# Patient Record
Sex: FEMALE | Race: WHITE | NOT HISPANIC OR LATINO | Employment: FULL TIME | URBAN - METROPOLITAN AREA
[De-identification: names, ages, dates, MRNs, and addresses within clinical notes are randomized per-mention and may not be internally consistent; named-entity substitution may affect disease eponyms.]

---

## 2018-07-20 ENCOUNTER — HOSPITAL ENCOUNTER (OUTPATIENT)
Dept: GENERAL RADIOLOGY | Facility: HOSPITAL | Age: 43
Discharge: HOME | End: 2018-07-20
Attending: INTERNAL MEDICINE | Admitting: INTERNAL MEDICINE

## 2018-07-20 ENCOUNTER — TRANSCRIBE ORDERS (OUTPATIENT)
Dept: ADMINISTRATIVE | Facility: HOSPITAL | Age: 43
End: 2018-07-20

## 2018-07-20 DIAGNOSIS — M25.531 WRIST PAIN, ACUTE, RIGHT: ICD-10-CM

## 2018-07-20 DIAGNOSIS — M25.531 WRIST PAIN, ACUTE, RIGHT: Primary | ICD-10-CM

## 2018-07-20 PROCEDURE — 73110 X-RAY EXAM OF WRIST: CPT

## 2019-02-08 ENCOUNTER — TRANSCRIBE ORDERS (OUTPATIENT)
Dept: ADMINISTRATIVE | Facility: HOSPITAL | Age: 44
End: 2019-02-08

## 2019-02-08 DIAGNOSIS — Z12.31 VISIT FOR SCREENING MAMMOGRAM: Primary | ICD-10-CM

## 2019-02-20 ENCOUNTER — HOSPITAL ENCOUNTER (OUTPATIENT)
Dept: MAMMOGRAPHY | Facility: HOSPITAL | Age: 44
Discharge: HOME | End: 2019-02-20
Admitting: INTERNAL MEDICINE

## 2019-02-20 ENCOUNTER — APPOINTMENT (OUTPATIENT)
Dept: OTHER | Facility: HOSPITAL | Age: 44
End: 2019-02-20

## 2019-02-20 DIAGNOSIS — Z12.31 VISIT FOR SCREENING MAMMOGRAM: ICD-10-CM

## 2019-02-20 PROCEDURE — 77067 SCR MAMMO BI INCL CAD: CPT | Performed by: RADIOLOGY

## 2019-02-20 PROCEDURE — 77063 BREAST TOMOSYNTHESIS BI: CPT | Performed by: RADIOLOGY

## 2019-02-20 PROCEDURE — 77067 SCR MAMMO BI INCL CAD: CPT

## 2019-02-20 PROCEDURE — 77063 BREAST TOMOSYNTHESIS BI: CPT

## 2019-05-20 ENCOUNTER — TRANSCRIBE ORDERS (OUTPATIENT)
Dept: ADMINISTRATIVE | Facility: HOSPITAL | Age: 44
End: 2019-05-20

## 2019-05-20 DIAGNOSIS — R10.12 LEFT UPPER QUADRANT PAIN: Primary | ICD-10-CM

## 2019-05-21 ENCOUNTER — HOSPITAL ENCOUNTER (OUTPATIENT)
Dept: CT IMAGING | Facility: HOSPITAL | Age: 44
Discharge: HOME | End: 2019-05-21
Admitting: NURSE PRACTITIONER

## 2019-05-21 DIAGNOSIS — R10.12 LEFT UPPER QUADRANT PAIN: ICD-10-CM

## 2019-05-21 PROCEDURE — 74176 CT ABD & PELVIS W/O CONTRAST: CPT

## 2019-05-28 ENCOUNTER — TRANSCRIBE ORDERS (OUTPATIENT)
Dept: ADMINISTRATIVE | Facility: HOSPITAL | Age: 44
End: 2019-05-28

## 2019-05-28 DIAGNOSIS — R91.1 NODULE OF LEFT LUNG: ICD-10-CM

## 2019-05-28 DIAGNOSIS — R10.12 LEFT UPPER QUADRANT PAIN: Primary | ICD-10-CM

## 2019-06-05 ENCOUNTER — HOSPITAL ENCOUNTER (OUTPATIENT)
Dept: CT IMAGING | Facility: HOSPITAL | Age: 44
Discharge: HOME | End: 2019-06-05
Admitting: NURSE PRACTITIONER

## 2019-06-05 DIAGNOSIS — R91.1 NODULE OF LEFT LUNG: ICD-10-CM

## 2019-06-05 PROCEDURE — 25010000002 IOPAMIDOL 61 % SOLUTION: Performed by: NURSE PRACTITIONER

## 2019-06-05 PROCEDURE — 71260 CT THORAX DX C+: CPT

## 2019-06-05 RX ADMIN — IOPAMIDOL 75 ML: 612 INJECTION, SOLUTION INTRAVENOUS at 13:30

## 2019-06-11 ENCOUNTER — OFFICE VISIT (OUTPATIENT)
Dept: PULMONOLOGY | Facility: CLINIC | Age: 44
End: 2019-06-11

## 2019-06-11 VITALS
WEIGHT: 171 LBS | OXYGEN SATURATION: 98 % | DIASTOLIC BLOOD PRESSURE: 82 MMHG | HEIGHT: 63 IN | SYSTOLIC BLOOD PRESSURE: 130 MMHG | TEMPERATURE: 98.6 F | HEART RATE: 88 BPM | BODY MASS INDEX: 30.3 KG/M2

## 2019-06-11 DIAGNOSIS — R91.8 MASS OF LOWER LOBE OF LEFT LUNG: ICD-10-CM

## 2019-06-11 DIAGNOSIS — R91.1 LUNG NODULE: Primary | ICD-10-CM

## 2019-06-11 DIAGNOSIS — F17.200 SMOKER: ICD-10-CM

## 2019-06-11 PROCEDURE — 94729 DIFFUSING CAPACITY: CPT | Performed by: INTERNAL MEDICINE

## 2019-06-11 PROCEDURE — 94375 RESPIRATORY FLOW VOLUME LOOP: CPT | Performed by: INTERNAL MEDICINE

## 2019-06-11 PROCEDURE — 94726 PLETHYSMOGRAPHY LUNG VOLUMES: CPT | Performed by: INTERNAL MEDICINE

## 2019-06-11 PROCEDURE — 99244 OFF/OP CNSLTJ NEW/EST MOD 40: CPT | Performed by: INTERNAL MEDICINE

## 2019-06-11 RX ORDER — TIZANIDINE HYDROCHLORIDE 4 MG/1
CAPSULE, GELATIN COATED ORAL
Refills: 0 | COMMUNITY
Start: 2019-06-04 | End: 2019-07-16

## 2019-06-11 RX ORDER — AMITRIPTYLINE HYDROCHLORIDE 50 MG/1
50 TABLET, FILM COATED ORAL NIGHTLY PRN
COMMUNITY
Start: 2019-05-20 | End: 2019-12-10 | Stop reason: HOSPADM

## 2019-06-11 NOTE — PROGRESS NOTES
Sorts Subjective:     Chief Complaint:   Chief Complaint   Patient presents with   • Lung Nodule       HPI:    Mishel Freeman is a 43 y.o. female seen in consultation at the request of PATY Lara for evaluation of a lung mass    This long-standing but intermittent left sided abdominal/flank pain.  She has had this off and on for several years.  This reoccurred last month.  She describes it as sharp and stabbing.  It has defied diagnosis though she does carry a history of diverticulosis.    An abdominal CT was ordered.  This revealed an incidental 2.2 cm mass in the left lower lobe.  A follow-up CT scan of the chest was performed.  This confirmed the finding and revealed no other pulmonary abnormalities and no adenopathy.  She was referred to me for further evaluation.    He has very little the way of pulmonary symptoms.  No weight loss.  She is a long-standing smoker but smokes less than 1 pack of cigarettes per week.  She has no history of tuberculosis, histoplasmosis, or other severe lung infections or any history of lung disease.    Further historical details:    General symptoms:  - no fever  - weight stable  - no edema    Exercise tolerance:  - no unusual exercise limitation    Chest symptoms:  - no chest pain  - normal/appropriate cough    Sputum:  - expectorates clear/white sputum  - denies hemoptysis    Upper airway:  - no sinus congestion or drainage    Reflux symptoms:  - no reflux symptoms    Recent imaging:  - CT of chest: As described    Current medications are:   Current Outpatient Medications:   •  amitriptyline (ELAVIL) 50 MG tablet, , Disp: , Rfl:   •  desvenlafaxine (PRISTIQ) 50 MG 24 hr tablet, Take 50 mg by mouth Daily., Disp: , Rfl:   •  lisinopril-hydrochlorothiazide (PRINZIDE,ZESTORETIC) 20-12.5 MG per tablet, Take 1 tablet by mouth Daily., Disp: , Rfl:   •  TiZANidine (ZANAFLEX) 4 MG capsule, TAKE 1 (ONE) CAPSULE EVERY 8 HOURS AS NEEDED FOR MUSCLE TIGHTNESS**DO NOT DRIVE WHILE  TAKING**, Disp: , Rfl: 0.      The patient's relevant past medical, surgical, family and social history were reviewed and updated in Epic as appropriate.     ROS:    Review of Systems  ROS documented in patient questionnaire ×14 systems.  Reviewed with patient.  Otherwise negative except as noted in HPI.    Objective:    Physical Exam   Constitutional: She is oriented to person, place, and time. She appears well-developed and well-nourished.   HENT:   Head: Normocephalic and atraumatic.   Nose: Nose normal.   Mouth/Throat: Oropharynx is clear and moist. No oropharyngeal exudate.   Eyes: Conjunctivae are normal. No scleral icterus.   Neck: No tracheal deviation present. No thyromegaly present.   Cardiovascular: Normal rate and regular rhythm. Exam reveals no gallop and no friction rub.   No murmur heard.  Pulmonary/Chest: Effort normal. No respiratory distress. She has no wheezes. She has no rales.   Musculoskeletal: She exhibits no edema or deformity.   Neurological: She is alert and oriented to person, place, and time.   Skin: Skin is warm and dry. No rash noted.   Psychiatric: She has a normal mood and affect. Her behavior is normal.   Nursing note and vitals reviewed.      Diagnostics:     PFT:  - normal spirometry  - normal lung volumes  -Borderline decrease in diffusing capacity    CXR:  - no additional    Assessment:    Problem List Items Addressed This Visit        Pulmonary Problems    Mass of lower lobe of left lung    Overview     2.2 cm incidental finding by CT 6/2019            Other    Smoker    Overview     Less than 1 pk/week           Other Visit Diagnoses     Lung nodule    -  Primary    Relevant Orders    Pulmonary Function Test (Completed)          43-year-old female with incidentally noted left lower lobe mass measuring 2.2 cm.  She has no pulmonary symptoms but given the size of this lesion it is high risk.    Plan:     1. PET scan  2. If positive will proceed with resection and I will refer her  to a thoracic surgeon  3. If negative could consider bronchoscopy with a EBUS  4. Discussed with patient and  in detail.  She understands the high risk nature for cancer in her case and the necessity for follow-up and treatment.    Level of Risk Moderate due to: undiagnosed new problem    Signed by  Willi Mo MD

## 2019-06-14 ENCOUNTER — HOSPITAL ENCOUNTER (OUTPATIENT)
Dept: PET IMAGING | Facility: HOSPITAL | Age: 44
Discharge: HOME | End: 2019-06-14

## 2019-06-14 ENCOUNTER — HOSPITAL ENCOUNTER (OUTPATIENT)
Dept: PET IMAGING | Facility: HOSPITAL | Age: 44
Discharge: HOME | End: 2019-06-14
Admitting: INTERNAL MEDICINE

## 2019-06-14 DIAGNOSIS — R91.8 MASS OF LOWER LOBE OF LEFT LUNG: ICD-10-CM

## 2019-06-14 LAB — GLUCOSE BLDC GLUCOMTR-MCNC: 95 MG/DL (ref 70–130)

## 2019-06-14 PROCEDURE — A9552 F18 FDG: HCPCS | Performed by: INTERNAL MEDICINE

## 2019-06-14 PROCEDURE — 82962 GLUCOSE BLOOD TEST: CPT

## 2019-06-14 PROCEDURE — 78815 PET IMAGE W/CT SKULL-THIGH: CPT

## 2019-06-14 PROCEDURE — 0 FLUDEOXYGLUCOSE F18 SOLUTION: Performed by: INTERNAL MEDICINE

## 2019-06-14 RX ADMIN — FLUDEOXYGLUCOSE F18 1 DOSE: 300 INJECTION INTRAVENOUS at 08:13

## 2019-07-02 ENCOUNTER — OFFICE VISIT (OUTPATIENT)
Dept: CARDIAC SURGERY | Facility: CLINIC | Age: 44
End: 2019-07-02

## 2019-07-02 VITALS
DIASTOLIC BLOOD PRESSURE: 90 MMHG | TEMPERATURE: 98.6 F | HEIGHT: 62 IN | SYSTOLIC BLOOD PRESSURE: 110 MMHG | BODY MASS INDEX: 31.47 KG/M2 | WEIGHT: 171 LBS | HEART RATE: 87 BPM | OXYGEN SATURATION: 100 %

## 2019-07-02 DIAGNOSIS — R91.8 MASS OF LOWER LOBE OF LEFT LUNG: Primary | ICD-10-CM

## 2019-07-02 PROCEDURE — 99204 OFFICE O/P NEW MOD 45 MIN: CPT | Performed by: THORACIC SURGERY (CARDIOTHORACIC VASCULAR SURGERY)

## 2019-07-02 RX ORDER — LORAZEPAM 1 MG/1
1 TABLET ORAL 2 TIMES DAILY PRN
COMMUNITY
Start: 2019-06-17 | End: 2019-11-09

## 2019-07-02 NOTE — PROGRESS NOTES
07/02/2019  Patient Information  Mishel Freeman                                                                                          [address]  [facility] [address]   1975  'PCP/Referring Physician'  Anastacia Jose MD  [phone]  Willi Mo*  [phone]  Chief Complaint   Patient presents with   • Consult     NP per Dr. Mo for Left Lower Lobe Lung Nodule-Found on CT for Left Sided Abdominal Pain       History of Present Illness: 43-year-old  female with a history of hypertension and active tobacco abuse who presents with a left lower lobe lung nodule.  Initially, the patient had left flank pain that prompted a CT scan of the abdomen.  This revealed an incidental left lower lobe lung mass.  Her left flank pain has been intermittently present for years.  She denies any symptoms including cough, hemoptysis, weight loss, lymphadenopathy, fevers or chills.    Patient Active Problem List   Diagnosis   • Microscopic hematuria   • Mass of lower lobe of left lung   • Smoker     Past Medical History:   Diagnosis Date   • Anxiety    • Depression    • GERD (gastroesophageal reflux disease)    • Hypertension    • Lung nodule      Past Surgical History:   Procedure Laterality Date   • CHOLECYSTECTOMY  1991   • ENDOMETRIAL ABLATION W/ NOVASURE  2010    WITH LAP BTL   • VAGINAL HYSTERECTOMY  2010    Partial        Current Outpatient Medications:   •  amitriptyline (ELAVIL) 50 MG tablet, As Needed., Disp: , Rfl:   •  desvenlafaxine (PRISTIQ) 50 MG 24 hr tablet, Take 50 mg by mouth Daily., Disp: , Rfl:   •  lisinopril-hydrochlorothiazide (PRINZIDE,ZESTORETIC) 20-12.5 MG per tablet, Take 1 tablet by mouth Daily., Disp: , Rfl:   •  LORazepam (ATIVAN) 1 MG tablet, 2 (Two) Times a Day., Disp: , Rfl:   •  TiZANidine (ZANAFLEX) 4 MG capsule, TAKE 1 (ONE) CAPSULE EVERY 8 HOURS AS NEEDED FOR MUSCLE TIGHTNESS**DO NOT DRIVE WHILE TAKING**, Disp: , Rfl: 0  Allergies   Allergen Reactions   •  Macrobid [Nitrofurantoin Macrocrystal] Hives     Social History     Socioeconomic History   • Marital status:      Spouse name: Not on file   • Number of children: 1   • Years of education: Not on file   • Highest education level: Not on file   Occupational History   • Occupation: [employer]   Tobacco Use   • Smoking status: Current Every Day Smoker     Packs/day: 0.25     Years: 30.00     Pack years: 7.50     Types: Cigarettes   • Smokeless tobacco: Never Used   • Tobacco comment: 5 a day    Substance and Sexual Activity   • Alcohol use: Yes     Alcohol/week: 0.6 oz     Types: 1 Cans of beer per week   • Drug use: No   • Sexual activity: Defer   Social History Narrative    Lives in [place].      Family History   Problem Relation Age of Onset   • Breast cancer Paternal Aunt         40's   • Cancer Mother    • Diabetes Father    • Diabetes Brother    • Cancer Maternal Aunt    • Ovarian cancer Neg Hx      Review of Systems   Constitution: Negative for chills, fever, malaise/fatigue, night sweats and weight loss.   HENT: Negative for hearing loss, odynophagia and sore throat.    Cardiovascular: Negative for chest pain, dyspnea on exertion, leg swelling, orthopnea and palpitations.   Respiratory: Negative for cough and hemoptysis.    Endocrine: Negative for cold intolerance, heat intolerance, polydipsia, polyphagia and polyuria.   Hematologic/Lymphatic: Bruises/bleeds easily.   Skin: Negative for itching and rash.   Musculoskeletal: Negative for joint pain, joint swelling and myalgias.   Gastrointestinal: Positive for abdominal pain (left side) and constipation. Negative for diarrhea, hematemesis, hematochezia, melena, nausea and vomiting.   Genitourinary: Positive for hematuria. Negative for dysuria and frequency.   Neurological: Negative for focal weakness, headaches, numbness and seizures.   Psychiatric/Behavioral: Negative for suicidal ideas. The patient is nervous/anxious.    All other systems  "reviewed and are negative.    Vitals:    07/02/19 1008   BP: 110/90   BP Location: Left arm   Patient Position: Sitting   Pulse: 87   Temp: 98.6 °F (37 °C)   SpO2: 100%   Weight: 77.6 kg (171 lb)   Height: 157.5 cm (62\")      Physical Exam   Constitutional: She is oriented to person, place, and time. She appears well-developed and well-nourished. No distress.   HENT:   Head: Normocephalic and atraumatic.   Eyes: Conjunctivae and EOM are normal. No scleral icterus.   Neck: Normal range of motion. Neck supple. No JVD present. No tracheal deviation present.   Cardiovascular: Normal rate, regular rhythm and normal heart sounds. Exam reveals no gallop and no friction rub.   No murmur heard.  Pulmonary/Chest: Effort normal and breath sounds normal. No stridor. No respiratory distress. She has no wheezes. She has no rales.   Abdominal: Soft. She exhibits no distension and no mass. There is no tenderness. There is no rebound and no guarding.   Musculoskeletal: Normal range of motion. She exhibits no deformity.   Lymphadenopathy:     She has no cervical adenopathy.     She has no axillary adenopathy.        Right: No supraclavicular adenopathy present.        Left: No supraclavicular adenopathy present.   Neurological: She is alert and oriented to person, place, and time.   Skin: No rash noted. No erythema.   Psychiatric: She has a normal mood and affect. Her behavior is normal. Judgment and thought content normal.       Labs/Imaging:  -PET/CT performed 6/14/2019, personally reviewed, demonstrates a left lower lobe nodule with an SUV of only 1.5.  There is symmetric activity within the palatine tonsils with an SUV of 5.1, thought to be reactive lymphoid tissue from rhinitis or allergies.  -CT the chest performed 6/6/2019, personally reviewed, demonstrates a noncalcified 2.2 x 1.7 x 1.5 cm left lower lobe nodule.  There is no mediastinal lymphadenopathy.  -Pulmonary function test performed 6/11/2019, FEV1 2.54 (89% " predicted), DLCO 73% predicted    Assessment/Plan: 43-year-old  female with a history of hypertension and active tobacco abuse who presents with an incidental 2.2 cm noncalcified left lower lobe lung nodule.  We discussed options including continued observation with a repeat CT scan, biopsy and surgical resection for diagnosis.  His options are listed in order of aggressiveness.  Unfortunately, this nodule is fairly central and surgical biopsy would require left lower lobectomy, which is fairly aggressive.  The patient elected to proceed with navigational bronchoscopy for biopsy.  I discussed this with Dr. Mo will arrange navigational bronchoscopy.  We will have the patient return to clinic after this biopsy has been performed.    Patient Active Problem List   Diagnosis   • Microscopic hematuria   • Mass of lower lobe of left lung   • Smoker

## 2019-07-16 ENCOUNTER — TRANSCRIBE ORDERS (OUTPATIENT)
Dept: PULMONOLOGY | Facility: CLINIC | Age: 44
End: 2019-07-16

## 2019-07-16 ENCOUNTER — APPOINTMENT (OUTPATIENT)
Dept: PREADMISSION TESTING | Facility: HOSPITAL | Age: 44
End: 2019-07-16

## 2019-07-16 LAB
ALBUMIN SERPL-MCNC: 4.2 G/DL (ref 3.5–5.2)
ALBUMIN/GLOB SERPL: 1.8 G/DL
ALP SERPL-CCNC: 43 U/L (ref 39–117)
ALT SERPL W P-5'-P-CCNC: 18 U/L (ref 1–33)
ANION GAP SERPL CALCULATED.3IONS-SCNC: 10 MMOL/L (ref 5–15)
AST SERPL-CCNC: 14 U/L (ref 1–32)
BILIRUB SERPL-MCNC: 0.3 MG/DL (ref 0.2–1.2)
BUN BLD-MCNC: 11 MG/DL (ref 6–20)
BUN/CREAT SERPL: 15.7 (ref 7–25)
CALCIUM SPEC-SCNC: 9.8 MG/DL (ref 8.6–10.5)
CHLORIDE SERPL-SCNC: 102 MMOL/L (ref 98–107)
CO2 SERPL-SCNC: 28 MMOL/L (ref 22–29)
CREAT BLD-MCNC: 0.7 MG/DL (ref 0.57–1)
DEPRECATED RDW RBC AUTO: 43.5 FL (ref 37–54)
ERYTHROCYTE [DISTWIDTH] IN BLOOD BY AUTOMATED COUNT: 12.5 % (ref 12.3–15.4)
GFR SERPL CREATININE-BSD FRML MDRD: 91 ML/MIN/1.73
GLOBULIN UR ELPH-MCNC: 2.4 GM/DL
GLUCOSE BLD-MCNC: 70 MG/DL (ref 65–99)
HCT VFR BLD AUTO: 37.5 % (ref 34–46.6)
HGB BLD-MCNC: 11.9 G/DL (ref 12–15.9)
INR PPP: 1.04 (ref 0.85–1.16)
MCH RBC QN AUTO: 30.4 PG (ref 26.6–33)
MCHC RBC AUTO-ENTMCNC: 31.7 G/DL (ref 31.5–35.7)
MCV RBC AUTO: 95.7 FL (ref 79–97)
PLATELET # BLD AUTO: 300 10*3/MM3 (ref 140–450)
PMV BLD AUTO: 8.8 FL (ref 6–12)
POTASSIUM BLD-SCNC: 4.5 MMOL/L (ref 3.5–5.2)
PROT SERPL-MCNC: 6.6 G/DL (ref 6–8.5)
PROTHROMBIN TIME: 13.1 SECONDS (ref 11.2–14.3)
RBC # BLD AUTO: 3.92 10*6/MM3 (ref 3.77–5.28)
SODIUM BLD-SCNC: 140 MMOL/L (ref 136–145)
WBC NRBC COR # BLD: 6.09 10*3/MM3 (ref 3.4–10.8)

## 2019-07-16 PROCEDURE — 85610 PROTHROMBIN TIME: CPT | Performed by: INTERNAL MEDICINE

## 2019-07-16 PROCEDURE — 85027 COMPLETE CBC AUTOMATED: CPT | Performed by: INTERNAL MEDICINE

## 2019-07-16 PROCEDURE — 80053 COMPREHEN METABOLIC PANEL: CPT | Performed by: INTERNAL MEDICINE

## 2019-07-16 PROCEDURE — 93010 ELECTROCARDIOGRAM REPORT: CPT | Performed by: INTERNAL MEDICINE

## 2019-07-16 PROCEDURE — 93005 ELECTROCARDIOGRAM TRACING: CPT

## 2019-07-16 PROCEDURE — 36415 COLL VENOUS BLD VENIPUNCTURE: CPT

## 2019-07-16 RX ORDER — ACETAMINOPHEN, ASPIRIN AND CAFFEINE 250; 250; 65 MG/1; MG/1; MG/1
1 TABLET, FILM COATED ORAL EVERY 6 HOURS PRN
COMMUNITY
End: 2022-10-20 | Stop reason: HOSPADM

## 2019-07-17 ENCOUNTER — HOSPITAL ENCOUNTER (OUTPATIENT)
Facility: HOSPITAL | Age: 44
Setting detail: HOSPITAL OUTPATIENT SURGERY
Discharge: HOME | End: 2019-07-17
Attending: INTERNAL MEDICINE | Admitting: ANESTHESIOLOGY

## 2019-07-17 ENCOUNTER — ANESTHESIA EVENT (OUTPATIENT)
Dept: GASTROENTEROLOGY | Facility: HOSPITAL | Age: 44
End: 2019-07-17

## 2019-07-17 ENCOUNTER — APPOINTMENT (OUTPATIENT)
Dept: GENERAL RADIOLOGY | Facility: HOSPITAL | Age: 44
End: 2019-07-17

## 2019-07-17 ENCOUNTER — ANESTHESIA (OUTPATIENT)
Dept: GASTROENTEROLOGY | Facility: HOSPITAL | Age: 44
End: 2019-07-17

## 2019-07-17 VITALS
HEART RATE: 80 BPM | RESPIRATION RATE: 20 BRPM | OXYGEN SATURATION: 94 % | TEMPERATURE: 97.8 F | SYSTOLIC BLOOD PRESSURE: 124 MMHG | DIASTOLIC BLOOD PRESSURE: 73 MMHG

## 2019-07-17 DIAGNOSIS — R91.1 LUNG NODULE: ICD-10-CM

## 2019-07-17 PROBLEM — F17.200 CURRENT SMOKER: Status: ACTIVE | Noted: 2019-07-17

## 2019-07-17 LAB
B-HCG UR QL: NEGATIVE
INTERNAL NEGATIVE CONTROL: NEGATIVE
INTERNAL POSITIVE CONTROL: POSITIVE
Lab: NORMAL

## 2019-07-17 PROCEDURE — 25010000002 ONDANSETRON PER 1 MG: Performed by: NURSE ANESTHETIST, CERTIFIED REGISTERED

## 2019-07-17 PROCEDURE — 87206 SMEAR FLUORESCENT/ACID STAI: CPT | Performed by: INTERNAL MEDICINE

## 2019-07-17 PROCEDURE — 76000 FLUOROSCOPY <1 HR PHYS/QHP: CPT

## 2019-07-17 PROCEDURE — 81025 URINE PREGNANCY TEST: CPT | Performed by: ANESTHESIOLOGY

## 2019-07-17 PROCEDURE — 25010000002 PROPOFOL 10 MG/ML EMULSION: Performed by: NURSE ANESTHETIST, CERTIFIED REGISTERED

## 2019-07-17 PROCEDURE — 31623 DX BRONCHOSCOPE/BRUSH: CPT | Performed by: INTERNAL MEDICINE

## 2019-07-17 PROCEDURE — 87070 CULTURE OTHR SPECIMN AEROBIC: CPT | Performed by: INTERNAL MEDICINE

## 2019-07-17 PROCEDURE — 88112 CYTOPATH CELL ENHANCE TECH: CPT | Performed by: INTERNAL MEDICINE

## 2019-07-17 PROCEDURE — 87116 MYCOBACTERIA CULTURE: CPT | Performed by: INTERNAL MEDICINE

## 2019-07-17 PROCEDURE — S0260 H&P FOR SURGERY: HCPCS | Performed by: INTERNAL MEDICINE

## 2019-07-17 PROCEDURE — 31627 NAVIGATIONAL BRONCHOSCOPY: CPT | Performed by: INTERNAL MEDICINE

## 2019-07-17 PROCEDURE — 25010000002 FENTANYL CITRATE (PF) 100 MCG/2ML SOLUTION: Performed by: NURSE ANESTHETIST, CERTIFIED REGISTERED

## 2019-07-17 PROCEDURE — 88305 TISSUE EXAM BY PATHOLOGIST: CPT | Performed by: INTERNAL MEDICINE

## 2019-07-17 PROCEDURE — 87102 FUNGUS ISOLATION CULTURE: CPT | Performed by: INTERNAL MEDICINE

## 2019-07-17 PROCEDURE — 31628 BRONCHOSCOPY/LUNG BX EACH: CPT | Performed by: INTERNAL MEDICINE

## 2019-07-17 PROCEDURE — 25010000002 NEOSTIGMINE 10 MG/10ML SOLUTION: Performed by: NURSE ANESTHETIST, CERTIFIED REGISTERED

## 2019-07-17 PROCEDURE — 31624 DX BRONCHOSCOPE/LAVAGE: CPT | Performed by: INTERNAL MEDICINE

## 2019-07-17 PROCEDURE — 71045 X-RAY EXAM CHEST 1 VIEW: CPT

## 2019-07-17 PROCEDURE — 87205 SMEAR GRAM STAIN: CPT | Performed by: INTERNAL MEDICINE

## 2019-07-17 RX ORDER — SODIUM CHLORIDE 0.9 % (FLUSH) 0.9 %
3-10 SYRINGE (ML) INJECTION AS NEEDED
Status: DISCONTINUED | OUTPATIENT
Start: 2019-07-17 | End: 2019-07-17 | Stop reason: HOSPADM

## 2019-07-17 RX ORDER — PROMETHAZINE HYDROCHLORIDE 25 MG/ML
6.25 INJECTION, SOLUTION INTRAMUSCULAR; INTRAVENOUS ONCE AS NEEDED
Status: DISCONTINUED | OUTPATIENT
Start: 2019-07-17 | End: 2019-07-17 | Stop reason: HOSPADM

## 2019-07-17 RX ORDER — ROCURONIUM BROMIDE 10 MG/ML
INJECTION, SOLUTION INTRAVENOUS AS NEEDED
Status: DISCONTINUED | OUTPATIENT
Start: 2019-07-17 | End: 2019-07-17 | Stop reason: SURG

## 2019-07-17 RX ORDER — IPRATROPIUM BROMIDE AND ALBUTEROL SULFATE 2.5; .5 MG/3ML; MG/3ML
3 SOLUTION RESPIRATORY (INHALATION) ONCE AS NEEDED
Status: DISCONTINUED | OUTPATIENT
Start: 2019-07-17 | End: 2019-07-17 | Stop reason: HOSPADM

## 2019-07-17 RX ORDER — PROPOFOL 10 MG/ML
VIAL (ML) INTRAVENOUS AS NEEDED
Status: DISCONTINUED | OUTPATIENT
Start: 2019-07-17 | End: 2019-07-17 | Stop reason: SURG

## 2019-07-17 RX ORDER — HYDRALAZINE HYDROCHLORIDE 20 MG/ML
5 INJECTION INTRAMUSCULAR; INTRAVENOUS
Status: DISCONTINUED | OUTPATIENT
Start: 2019-07-17 | End: 2019-07-17 | Stop reason: HOSPADM

## 2019-07-17 RX ORDER — ONDANSETRON 2 MG/ML
4 INJECTION INTRAMUSCULAR; INTRAVENOUS ONCE AS NEEDED
Status: COMPLETED | OUTPATIENT
Start: 2019-07-17 | End: 2019-07-17

## 2019-07-17 RX ORDER — FENTANYL CITRATE 50 UG/ML
50 INJECTION, SOLUTION INTRAMUSCULAR; INTRAVENOUS
Status: DISCONTINUED | OUTPATIENT
Start: 2019-07-17 | End: 2019-07-17 | Stop reason: HOSPADM

## 2019-07-17 RX ORDER — SODIUM CHLORIDE, SODIUM LACTATE, POTASSIUM CHLORIDE, CALCIUM CHLORIDE 600; 310; 30; 20 MG/100ML; MG/100ML; MG/100ML; MG/100ML
9 INJECTION, SOLUTION INTRAVENOUS CONTINUOUS
Status: DISCONTINUED | OUTPATIENT
Start: 2019-07-17 | End: 2019-07-17 | Stop reason: HOSPADM

## 2019-07-17 RX ORDER — PROMETHAZINE HYDROCHLORIDE 25 MG/1
25 TABLET ORAL ONCE AS NEEDED
Status: DISCONTINUED | OUTPATIENT
Start: 2019-07-17 | End: 2019-07-17 | Stop reason: HOSPADM

## 2019-07-17 RX ORDER — SODIUM CHLORIDE 0.9 % (FLUSH) 0.9 %
3 SYRINGE (ML) INJECTION EVERY 12 HOURS SCHEDULED
Status: DISCONTINUED | OUTPATIENT
Start: 2019-07-17 | End: 2019-07-17 | Stop reason: HOSPADM

## 2019-07-17 RX ORDER — PROMETHAZINE HYDROCHLORIDE 25 MG/1
25 SUPPOSITORY RECTAL ONCE AS NEEDED
Status: DISCONTINUED | OUTPATIENT
Start: 2019-07-17 | End: 2019-07-17 | Stop reason: HOSPADM

## 2019-07-17 RX ORDER — GLYCOPYRROLATE 0.2 MG/ML
INJECTION INTRAMUSCULAR; INTRAVENOUS AS NEEDED
Status: DISCONTINUED | OUTPATIENT
Start: 2019-07-17 | End: 2019-07-17 | Stop reason: SURG

## 2019-07-17 RX ORDER — LABETALOL HYDROCHLORIDE 5 MG/ML
5 INJECTION, SOLUTION INTRAVENOUS
Status: DISCONTINUED | OUTPATIENT
Start: 2019-07-17 | End: 2019-07-17 | Stop reason: HOSPADM

## 2019-07-17 RX ORDER — LIDOCAINE HYDROCHLORIDE 10 MG/ML
INJECTION, SOLUTION EPIDURAL; INFILTRATION; INTRACAUDAL; PERINEURAL AS NEEDED
Status: DISCONTINUED | OUTPATIENT
Start: 2019-07-17 | End: 2019-07-17 | Stop reason: SURG

## 2019-07-17 RX ORDER — FAMOTIDINE 20 MG/1
20 TABLET, FILM COATED ORAL ONCE
Status: DISCONTINUED | OUTPATIENT
Start: 2019-07-17 | End: 2019-07-17 | Stop reason: HOSPADM

## 2019-07-17 RX ORDER — MEPERIDINE HYDROCHLORIDE 25 MG/ML
12.5 INJECTION INTRAMUSCULAR; INTRAVENOUS; SUBCUTANEOUS
Status: DISCONTINUED | OUTPATIENT
Start: 2019-07-17 | End: 2019-07-17 | Stop reason: HOSPADM

## 2019-07-17 RX ORDER — LIDOCAINE HYDROCHLORIDE 10 MG/ML
0.5 INJECTION, SOLUTION EPIDURAL; INFILTRATION; INTRACAUDAL; PERINEURAL ONCE AS NEEDED
Status: DISCONTINUED | OUTPATIENT
Start: 2019-07-17 | End: 2019-07-17 | Stop reason: HOSPADM

## 2019-07-17 RX ORDER — NEOSTIGMINE METHYLSULFATE 1 MG/ML
INJECTION, SOLUTION INTRAVENOUS AS NEEDED
Status: DISCONTINUED | OUTPATIENT
Start: 2019-07-17 | End: 2019-07-17 | Stop reason: SURG

## 2019-07-17 RX ORDER — FAMOTIDINE 10 MG/ML
20 INJECTION, SOLUTION INTRAVENOUS ONCE
Status: COMPLETED | OUTPATIENT
Start: 2019-07-17 | End: 2019-07-17

## 2019-07-17 RX ADMIN — ROCURONIUM BROMIDE 30 MG: 10 INJECTION INTRAVENOUS at 14:03

## 2019-07-17 RX ADMIN — FENTANYL CITRATE 50 MCG: 50 INJECTION, SOLUTION INTRAMUSCULAR; INTRAVENOUS at 14:03

## 2019-07-17 RX ADMIN — FAMOTIDINE 20 MG: 10 INJECTION, SOLUTION INTRAVENOUS at 13:06

## 2019-07-17 RX ADMIN — LIDOCAINE HYDROCHLORIDE 50 MG: 10 INJECTION, SOLUTION EPIDURAL; INFILTRATION; INTRACAUDAL; PERINEURAL at 14:03

## 2019-07-17 RX ADMIN — SODIUM CHLORIDE, POTASSIUM CHLORIDE, SODIUM LACTATE AND CALCIUM CHLORIDE 9 ML/HR: 600; 310; 30; 20 INJECTION, SOLUTION INTRAVENOUS at 13:06

## 2019-07-17 RX ADMIN — PROPOFOL 150 MG: 10 INJECTION, EMULSION INTRAVENOUS at 14:03

## 2019-07-17 RX ADMIN — NEOSTIGMINE METHYLSULFATE 2 MG: 1 INJECTION, SOLUTION INTRAVENOUS at 14:43

## 2019-07-17 RX ADMIN — ONDANSETRON 4 MG: 2 INJECTION INTRAMUSCULAR; INTRAVENOUS at 14:43

## 2019-07-17 RX ADMIN — GLYCOPYRROLATE 0.4 MG: 0.2 INJECTION, SOLUTION INTRAMUSCULAR; INTRAVENOUS at 14:43

## 2019-07-17 RX ADMIN — FENTANYL CITRATE 50 MCG: 50 INJECTION, SOLUTION INTRAMUSCULAR; INTRAVENOUS at 14:59

## 2019-07-17 NOTE — ANESTHESIA PREPROCEDURE EVALUATION
Anesthesia Evaluation     NPO Solid Status: > 8 hours  NPO Liquid Status: > 6 hours           Airway   Mallampati: II  TM distance: >3 FB  Neck ROM: full  No difficulty expected  Dental - normal exam     Pulmonary    (+) a smoker Current, decreased breath sounds,   (-) asthma  Cardiovascular     Rhythm: regular  Rate: normal    (+) hypertension,   (-) angina, murmur, cardiac stents      Neuro/Psych  (-) seizures  GI/Hepatic/Renal/Endo    (-) liver disease, no renal disease    Musculoskeletal     Abdominal    Substance History      OB/GYN          Other        ROS/Med Hx Other: Prior hysterectomy    LEXUS mass 2.2cm                  Anesthesia Plan    ASA 2     general     intravenous induction     Plan discussed with CRNA.

## 2019-07-17 NOTE — H&P
Pulmonary Medicine preoperative evaluation     Patient Care Team:  Anastacia Jose MD as PCP - General (Internal Medicine)    Reason for Consultation: Left lower lobe lung nodule    Subjective   History of Present Illness:  Ms. Mishel Freeman is a 43 y.o. female who was referred to me by Dr. Painting and my partner Dr. Mo after the incidental discovery of a 2.2 cm noncalcified left lower lobe well-circumscribed lung nodule.  The patient is a current active smoker who has a history of anxiety as well as hypertension.  Tissue diagnosis was desired by the patient and due to the positioning of the nodularity it was felt that the trans-bronchoscopic approach would be the most effective method of obtaining this.     Review of Systems:  A full review of systems has been completed with the patient and it is negative except as mentioned expressly in the HPI.    Past Medical History:  Past Medical History:   Diagnosis Date   • Anxiety    • Depression    • Diverticulitis    • Eczema    • GERD (gastroesophageal reflux disease)    • Headache    • Hypertension    • Lung nodule    • Tobacco abuse      Past Surgical History:   Procedure Laterality Date   • CHOLECYSTECTOMY  1991   • COLONOSCOPY  2016, 2017   • ENDOMETRIAL ABLATION W/ NOVASURE  2010    WITH LAP BTL   • VAGINAL HYSTERECTOMY  2010    Partial        Allergies:  Allergies   Allergen Reactions   • Macrobid [Nitrofurantoin Macrocrystal] Hives and Shortness Of Breath       Medications:  No current facility-administered medications on file prior to encounter.      Current Outpatient Medications on File Prior to Encounter   Medication Sig Dispense Refill   • amitriptyline (ELAVIL) 50 MG tablet Take 50 mg by mouth At Night As Needed for Sleep.     • desvenlafaxine (PRISTIQ) 50 MG 24 hr tablet Take 50 mg by mouth Daily.     • lisinopril-hydrochlorothiazide (PRINZIDE,ZESTORETIC) 20-12.5 MG per tablet Take 1 tablet by mouth Daily.     • LORazepam (ATIVAN) 1 MG  tablet Take 1 mg by mouth 2 (Two) Times a Day As Needed for Anxiety.         lactated ringers 9 mL/hr Last Rate: 9 mL/hr (07/17/19 1306)       famotidine 20 mg Oral Once   sodium chloride 3 mL Intravenous Q12H       Social History:  Social History     Socioeconomic History   • Marital status:      Spouse name: Not on file   • Number of children: 1   • Years of education: Not on file   • Highest education level: Not on file   Occupational History   • Occupation: [employer]   Tobacco Use   • Smoking status: Current Every Day Smoker     Packs/day: 0.25     Years: 30.00     Pack years: 7.50     Types: Cigarettes   • Smokeless tobacco: Never Used   • Tobacco comment: 5 a day    Substance and Sexual Activity   • Alcohol use: Yes     Alcohol/week: 0.6 oz     Types: 1 Cans of beer per week   • Drug use: No   • Sexual activity: Defer   Social History Narrative    Lives in [place].        Family History:  Family History   Problem Relation Age of Onset   • Breast cancer Paternal Aunt         40's   • Cancer Mother    • Diabetes Father    • Diabetes Brother    • Cancer Maternal Aunt    • Ovarian cancer Neg Hx      Family history is reviewed and is not contributory to the case.    Objective   Objective     Physical Exam:  Vitals:    07/17/19 1257   BP: 117/84   Pulse: 61   Resp: 16   Temp: 97.8 °F (36.6 °C)   SpO2: 99%     Physical Exam   Constitutional: She is oriented to person, place, and time. She appears well-developed and well-nourished. No distress.   HENT:   Head: Normocephalic and atraumatic.   Nose: Nose normal.   Mouth/Throat: No oropharyngeal exudate.   Eyes: EOM are normal. Pupils are equal, round, and reactive to light.   Neck: Normal range of motion. Neck supple. No JVD present. No tracheal deviation present. No thyromegaly present.   Cardiovascular: Normal rate, regular rhythm and normal heart sounds. Exam reveals no friction rub.   No murmur heard.  Pulmonary/Chest: Effort normal and breath  sounds normal. No stridor. No respiratory distress. She has no wheezes. She has no rales. She exhibits no tenderness.   Abdominal: Soft. Bowel sounds are normal.   Musculoskeletal: Normal range of motion. She exhibits no edema or deformity.   Lymphadenopathy:     She has no cervical adenopathy.   Neurological: She is alert and oriented to person, place, and time.   Skin: Skin is warm. Capillary refill takes less than 2 seconds. No rash noted. She is not diaphoretic. No erythema.   Psychiatric: She has a normal mood and affect. Her behavior is normal. Judgment and thought content normal.       Lab Results/Imaging/Diagnostics:  Results from last 7 days   Lab Units 07/16/19  1316   WBC 10*3/mm3 6.09   HEMOGLOBIN g/dL 11.9*   PLATELETS 10*3/mm3 300     Results from last 7 days   Lab Units 07/16/19  1316   SODIUM mmol/L 140   POTASSIUM mmol/L 4.5   CO2 mmol/L 28.0   BUN mg/dL 11   CREATININE mg/dL 0.70   GLUCOSE mg/dL 70     Estimated Creatinine Clearance: 98.9 mL/min (by C-G formula based on SCr of 0.7 mg/dL).              Images: CT scan of the chest has been reviewed.    Assessment/Plan   Impression & Plan     Impression:    Nodule of lower lobe of left lung, 2.2 cm noncalcified    Current smoker      Plan:  We will plan for bronchoscopy with navigational guidance to this left lower lobe lung nodule.  We will likely employ endobronchial ultrasound during this to confirm our placement.  If possible I will take multiple transbronchial biopsies of the lesion and potentially do transbronchial needle aspiration.    I discussed the risks, benefits, and alternatives with the patient and her  and she agrees to proceed.      Nikhil Knutson MD, [facility]  Pulmonary and Critical Care Medicine  07/17/19 1:24 PM

## 2019-07-17 NOTE — ANESTHESIA POSTPROCEDURE EVALUATION
Patient: Mishel Freeman    Procedure Summary     Date:  07/17/19 Room / Location:   [facility]    Anesthesia Start:  1400 Anesthesia Stop:      Procedure:  DARIN, RADIAL EBUS WITH FLUORO BRONCH (N/A Bronchus) Diagnosis:      Surgeon:  Nikhil Knutson MD Provider:  Trevor Van MD    Anesthesia Type:  general ASA Status:  2          Anesthesia Type: general  Last vitals  BP   124/73 (07/17/19 1455)   Temp   97.8 °F (36.6 °C) (07/17/19 1455)   Pulse   80 (07/17/19 1455)   Resp   20 (07/17/19 1455)     SpO2   94 % (07/17/19 1455)     Post Anesthesia Care and Evaluation    Patient location during evaluation: PACU  Patient participation: complete - patient participated  Level of consciousness: awake and alert  Pain score: 4  Pain management: inadequate  Airway patency: patent  Anesthetic complications: No anesthetic complications  PONV Status: none  Cardiovascular status: hemodynamically stable and acceptable  Respiratory status: nonlabored ventilation, acceptable and nasal cannula  Hydration status: acceptable    Comments: Gave fentanyl 50 mcg

## 2019-07-17 NOTE — ANESTHESIA PROCEDURE NOTES
Airway  Urgency: elective      General Information and Staff    Patient location during procedure: OR  CRNA: Juan Alberto Palafox CRNA    Indications and Patient Condition  Indications for airway management: airway protection    Preoxygenated: yes  MILS not maintained throughout  Mask difficulty assessment: 1 - vent by mask    Final Airway Details  Final airway type: endotracheal airway      Successful airway: ETT  Cuffed: yes   Successful intubation technique: flexible bronchoscopy  Endotracheal tube insertion site: oral  ETT size (mm): 8.5  Cormack-Lehane Classification: grade III - view of epiglottis only  Placement verified by: chest auscultation and capnometry   Cuff volume (mL): 6  Measured from: lips  ETT to lips (cm): 22  Number of attempts at approach: 3 or more

## 2019-07-17 NOTE — OP NOTE
Bronchoscopy Procedural Note       Date of Operation: 7/17/2019    Indication: This is a 43 y.o. who previously saw my partner Dr. Jung and Dr. Painting with CT surgery regarding an incidental finding of a left lower lobe pulmonary nodule.  She was referred for navigational bronchoscopy as it was felt that an open biopsy would require complete lobectomy of the left lower lobe.    Pre-op Diagnosis: PET negative left lower lobe lung nodule    Post-op Diagnosis: Same    Surgeon: Nikhil Knutson MD    Procedures Performed:  Endotracheal intubation over the scope with #8.5 endotracheal tube  Flexible fiberoptic bronchoscopy  Electromagnetic navigation with transbronchial lung biopsy under fluoroscopic guidance of the left lower lobe nodule  Radial endobronchial ultrasound left lower lobe  Bronchoalveolar lavage of left lower lobe  Cytology brushing under fluoroscopic guidance of the left lower lobe nodule    Anesthesia: General    Estimated Blood Loss: <5 ml    Description of Procedure:  After obtaining informed consent and completing a procedural pause, the patient was turned over to anesthesia for preoperative care.  There was some difficulty initially visualizing the vocal cord structures due to the patient's anatomy and after discussion with anesthesia I proceeded with intubation over the bronchoscope.  I was able to place the endotracheal tube at approximately 2 cm above the donnell without difficulty.  The donnell is sharp.  After securing of the airway I proceeded to inspect the airway.    I proceeded to the right side and surveyed the right upper lobe, the right middle lobe, and the right lower lobe.  These are seem to be anatomically normal with no endobronchial lesions.  There are minimal clear secretions.  I then proceeded to the left side and surveyed the left upper lobe, the lingula, and the left lower lobe.  These also are similarly free of lesions.  There are minimal secretions.  I was able to inspect  to the subsegmental level of the left lower lobe and I was unable to identify any visible abnormality which would correspond to the 2.2 cm nodule on the CAT scan.    After synchronization with navigation system, I was able to navigate to the lesion in the left lower lobe.  Confirmation initially was made with fluoroscopy.  I removed the locatable guide leaving the working channel in place.  I then was able to pass the radial endobronchial ultrasound probe and was able to confirm soft tissue density consistent with the nodule at the terminus of the working channel.    Under fluoroscopic guidance, I made multiple transbronchial lung biopsies with good return of material.  Hemostasis was good.  I then under fluoroscopic guidance past the cytology brush and brush this area.  I then proceeded with bronchoalveolar lavage with a total of 60 mL of saline with return of approximately 20 mL of pink lavage fluid.  There was no fluoroscopic evidence of pneumothorax.  The airways were suctioned clean and the patient was turned over to anesthesia for postoperative care.  I saw the patient in the PACU and she was doing well.  There were no immediate complications.  Chest x-ray confirmed no postprocedural pneumothorax.    Findings and Recommendations:  We will await pathology and microbiological studies from the specimen.  The patient will follow-up via telephone with either myself or Dr. Mo for results.      Nikhil Knutson MD, [facility]  Pulmonary and Critical Care Medicine   07/17/19 3:12 PM

## 2019-07-18 LAB
CYTO UR: NORMAL
LAB AP CASE REPORT: NORMAL
LAB AP CLINICAL INFORMATION: NORMAL
PATH REPORT.FINAL DX SPEC: NORMAL
PATH REPORT.GROSS SPEC: NORMAL

## 2019-07-19 LAB
BACTERIA SPEC RESP CULT: NORMAL
GRAM STN SPEC: NORMAL
LAB AP CASE REPORT: NORMAL
PATH REPORT.FINAL DX SPEC: NORMAL

## 2019-07-20 LAB
GIE STN SPEC: NORMAL
GIE STN SPEC: NORMAL

## 2019-07-29 ENCOUNTER — TELEPHONE (OUTPATIENT)
Dept: PULMONOLOGY | Facility: CLINIC | Age: 44
End: 2019-07-29

## 2019-08-28 LAB
FUNGUS WND CULT: NORMAL
MYCOBACTERIUM SPEC CULT: NORMAL
NIGHT BLUE STAIN TISS: NORMAL

## 2019-09-25 ENCOUNTER — APPOINTMENT (OUTPATIENT)
Dept: CT IMAGING | Facility: HOSPITAL | Age: 44
End: 2019-09-25

## 2019-09-25 ENCOUNTER — HOSPITAL ENCOUNTER (EMERGENCY)
Facility: HOSPITAL | Age: 44
Discharge: HOME | End: 2019-09-25
Attending: EMERGENCY MEDICINE | Admitting: EMERGENCY MEDICINE

## 2019-09-25 VITALS
SYSTOLIC BLOOD PRESSURE: 107 MMHG | RESPIRATION RATE: 18 BRPM | WEIGHT: 170 LBS | OXYGEN SATURATION: 95 % | HEART RATE: 74 BPM | BODY MASS INDEX: 31.28 KG/M2 | TEMPERATURE: 98.3 F | DIASTOLIC BLOOD PRESSURE: 70 MMHG | HEIGHT: 62 IN

## 2019-09-25 DIAGNOSIS — R10.9 ABDOMINAL PAIN, UNSPECIFIED ABDOMINAL LOCATION: Primary | ICD-10-CM

## 2019-09-25 DIAGNOSIS — R11.2 NAUSEA AND VOMITING, INTRACTABILITY OF VOMITING NOT SPECIFIED, UNSPECIFIED VOMITING TYPE: ICD-10-CM

## 2019-09-25 LAB
ALBUMIN SERPL-MCNC: 4.4 G/DL (ref 3.5–5.2)
ALBUMIN/GLOB SERPL: 1.5 G/DL
ALP SERPL-CCNC: 49 U/L (ref 39–117)
ALT SERPL W P-5'-P-CCNC: 18 U/L (ref 1–33)
ANION GAP SERPL CALCULATED.3IONS-SCNC: 14 MMOL/L (ref 5–15)
AST SERPL-CCNC: 16 U/L (ref 1–32)
BACTERIA UR QL AUTO: ABNORMAL /HPF
BASOPHILS # BLD AUTO: 0.03 10*3/MM3 (ref 0–0.2)
BASOPHILS NFR BLD AUTO: 0.4 % (ref 0–1.5)
BILIRUB SERPL-MCNC: 0.5 MG/DL (ref 0.2–1.2)
BILIRUB UR QL STRIP: NEGATIVE
BUN BLD-MCNC: 9 MG/DL (ref 6–20)
BUN/CREAT SERPL: 16.7 (ref 7–25)
CALCIUM SPEC-SCNC: 9.5 MG/DL (ref 8.6–10.5)
CHLORIDE SERPL-SCNC: 101 MMOL/L (ref 98–107)
CLARITY UR: CLEAR
CO2 SERPL-SCNC: 23 MMOL/L (ref 22–29)
COLOR UR: YELLOW
CREAT BLD-MCNC: 0.54 MG/DL (ref 0.57–1)
D-LACTATE SERPL-SCNC: 1.7 MMOL/L (ref 0.5–2)
DEPRECATED RDW RBC AUTO: 45.1 FL (ref 37–54)
EOSINOPHIL # BLD AUTO: 0.09 10*3/MM3 (ref 0–0.4)
EOSINOPHIL NFR BLD AUTO: 1.1 % (ref 0.3–6.2)
ERYTHROCYTE [DISTWIDTH] IN BLOOD BY AUTOMATED COUNT: 13.2 % (ref 12.3–15.4)
GFR SERPL CREATININE-BSD FRML MDRD: 122 ML/MIN/1.73
GLOBULIN UR ELPH-MCNC: 3 GM/DL
GLUCOSE BLD-MCNC: 99 MG/DL (ref 65–99)
GLUCOSE UR STRIP-MCNC: NEGATIVE MG/DL
HCT VFR BLD AUTO: 39.5 % (ref 34–46.6)
HGB BLD-MCNC: 13.1 G/DL (ref 12–15.9)
HGB UR QL STRIP.AUTO: ABNORMAL
HOLD SPECIMEN: NORMAL
HOLD SPECIMEN: NORMAL
HYALINE CASTS UR QL AUTO: ABNORMAL /LPF
IMM GRANULOCYTES # BLD AUTO: 0.02 10*3/MM3 (ref 0–0.05)
IMM GRANULOCYTES NFR BLD AUTO: 0.3 % (ref 0–0.5)
KETONES UR QL STRIP: NEGATIVE
LEUKOCYTE ESTERASE UR QL STRIP.AUTO: NEGATIVE
LIPASE SERPL-CCNC: 18 U/L (ref 13–60)
LYMPHOCYTES # BLD AUTO: 2.35 10*3/MM3 (ref 0.7–3.1)
LYMPHOCYTES NFR BLD AUTO: 29.7 % (ref 19.6–45.3)
MCH RBC QN AUTO: 30.9 PG (ref 26.6–33)
MCHC RBC AUTO-ENTMCNC: 33.2 G/DL (ref 31.5–35.7)
MCV RBC AUTO: 93.2 FL (ref 79–97)
MONOCYTES # BLD AUTO: 0.56 10*3/MM3 (ref 0.1–0.9)
MONOCYTES NFR BLD AUTO: 7.1 % (ref 5–12)
NEUTROPHILS # BLD AUTO: 4.85 10*3/MM3 (ref 1.7–7)
NEUTROPHILS NFR BLD AUTO: 61.4 % (ref 42.7–76)
NITRITE UR QL STRIP: NEGATIVE
NRBC BLD AUTO-RTO: 0 /100 WBC (ref 0–0.2)
PH UR STRIP.AUTO: <=5 [PH] (ref 5–8)
PLATELET # BLD AUTO: 342 10*3/MM3 (ref 140–450)
PMV BLD AUTO: 8.6 FL (ref 6–12)
POTASSIUM BLD-SCNC: 4 MMOL/L (ref 3.5–5.2)
PROT SERPL-MCNC: 7.4 G/DL (ref 6–8.5)
PROT UR QL STRIP: NEGATIVE
RBC # BLD AUTO: 4.24 10*6/MM3 (ref 3.77–5.28)
RBC # UR: ABNORMAL /HPF
REF LAB TEST METHOD: ABNORMAL
SODIUM BLD-SCNC: 138 MMOL/L (ref 136–145)
SP GR UR STRIP: 1.02 (ref 1–1.03)
SQUAMOUS #/AREA URNS HPF: ABNORMAL /HPF
UROBILINOGEN UR QL STRIP: ABNORMAL
WBC NRBC COR # BLD: 7.9 10*3/MM3 (ref 3.4–10.8)
WBC UR QL AUTO: ABNORMAL /HPF
WHOLE BLOOD HOLD SPECIMEN: NORMAL
WHOLE BLOOD HOLD SPECIMEN: NORMAL

## 2019-09-25 PROCEDURE — 25010000002 IOPAMIDOL 61 % SOLUTION: Performed by: EMERGENCY MEDICINE

## 2019-09-25 PROCEDURE — 81001 URINALYSIS AUTO W/SCOPE: CPT | Performed by: EMERGENCY MEDICINE

## 2019-09-25 PROCEDURE — 74177 CT ABD & PELVIS W/CONTRAST: CPT

## 2019-09-25 PROCEDURE — 99284 EMERGENCY DEPT VISIT MOD MDM: CPT

## 2019-09-25 PROCEDURE — 85025 COMPLETE CBC W/AUTO DIFF WBC: CPT | Performed by: EMERGENCY MEDICINE

## 2019-09-25 PROCEDURE — 96375 TX/PRO/DX INJ NEW DRUG ADDON: CPT

## 2019-09-25 PROCEDURE — 83690 ASSAY OF LIPASE: CPT | Performed by: EMERGENCY MEDICINE

## 2019-09-25 PROCEDURE — 25010000002 ONDANSETRON PER 1 MG: Performed by: PHYSICIAN ASSISTANT

## 2019-09-25 PROCEDURE — 25010000002 MORPHINE PER 10 MG: Performed by: EMERGENCY MEDICINE

## 2019-09-25 PROCEDURE — 80053 COMPREHEN METABOLIC PANEL: CPT | Performed by: EMERGENCY MEDICINE

## 2019-09-25 PROCEDURE — 83605 ASSAY OF LACTIC ACID: CPT | Performed by: PHYSICIAN ASSISTANT

## 2019-09-25 PROCEDURE — 96374 THER/PROPH/DIAG INJ IV PUSH: CPT

## 2019-09-25 RX ORDER — SODIUM CHLORIDE 0.9 % (FLUSH) 0.9 %
10 SYRINGE (ML) INJECTION AS NEEDED
Status: DISCONTINUED | OUTPATIENT
Start: 2019-09-25 | End: 2019-09-25 | Stop reason: HOSPADM

## 2019-09-25 RX ORDER — DICYCLOMINE HYDROCHLORIDE 10 MG/1
20 CAPSULE ORAL ONCE
Status: COMPLETED | OUTPATIENT
Start: 2019-09-25 | End: 2019-09-25

## 2019-09-25 RX ORDER — MORPHINE SULFATE 4 MG/ML
4 INJECTION, SOLUTION INTRAMUSCULAR; INTRAVENOUS ONCE
Status: COMPLETED | OUTPATIENT
Start: 2019-09-25 | End: 2019-09-25

## 2019-09-25 RX ORDER — ONDANSETRON 4 MG/1
4 TABLET, ORALLY DISINTEGRATING ORAL EVERY 8 HOURS PRN
Qty: 14 TABLET | Refills: 0 | Status: SHIPPED | OUTPATIENT
Start: 2019-09-25 | End: 2019-11-09

## 2019-09-25 RX ORDER — DICYCLOMINE HCL 20 MG
20 TABLET ORAL EVERY 8 HOURS PRN
Qty: 15 TABLET | Refills: 0 | Status: SHIPPED | OUTPATIENT
Start: 2019-09-25 | End: 2019-11-09

## 2019-09-25 RX ORDER — ONDANSETRON 2 MG/ML
4 INJECTION INTRAMUSCULAR; INTRAVENOUS ONCE
Status: COMPLETED | OUTPATIENT
Start: 2019-09-25 | End: 2019-09-25

## 2019-09-25 RX ADMIN — SODIUM CHLORIDE 1000 ML: 9 INJECTION, SOLUTION INTRAVENOUS at 07:30

## 2019-09-25 RX ADMIN — DICYCLOMINE HYDROCHLORIDE 20 MG: 10 CAPSULE ORAL at 09:57

## 2019-09-25 RX ADMIN — MORPHINE SULFATE 4 MG: 4 INJECTION, SOLUTION INTRAMUSCULAR; INTRAVENOUS at 07:30

## 2019-09-25 RX ADMIN — IOPAMIDOL 100 ML: 612 INJECTION, SOLUTION INTRAVENOUS at 08:03

## 2019-09-25 RX ADMIN — ONDANSETRON 4 MG: 2 INJECTION INTRAMUSCULAR; INTRAVENOUS at 07:30

## 2019-09-25 NOTE — DISCHARGE INSTRUCTIONS
You were seen abdominal pain and vomiting.  Please return here for any worsening of your symptoms, fever, blood in the stool or vomit.  See your PCP within 2 days for reevaluation.

## 2019-09-25 NOTE — ED PROVIDER NOTES
Subjective   Patient presents complaining of left lower quadrant abdominal pain.  She describes her pain as sharp, nonradiating, rated 8 out of 10.  She reports some associated nonbilious nausea and vomiting.  She denies diarrhea or constipation.  She denies any urinary symptoms.  She denies any exacerbating or alleviating factors.  She reports that she tried heat without any improvement.  Her symptoms started this morning around 1 AM.  She has a history of diverticulitis and the pain seems similar, however not the nausea vomiting.        Abdominal Pain   Pain location:  LLQ  Pain quality: sharp    Pain radiates to:  Does not radiate  Pain severity:  Severe  Onset quality:  Gradual  Duration:  6 hours  Timing:  Constant  Progression:  Worsening  Chronicity:  Recurrent  Context comment:  Diverticulitis history   Relieved by:  Nothing  Worsened by:  Nothing  Ineffective treatments:  Heat  Associated symptoms: nausea and vomiting    Associated symptoms: no chills, no constipation, no diarrhea, no dysuria and no fever    Risk factors: no alcohol abuse and not pregnant        Review of Systems   Constitutional: Negative for chills and fever.   Gastrointestinal: Positive for abdominal pain, nausea and vomiting. Negative for constipation and diarrhea.   Genitourinary: Negative for dysuria and frequency.   Skin: Negative for color change and rash.   All other systems reviewed and are negative.      Past Medical History:   Diagnosis Date   • Anxiety    • Depression    • Diverticulitis    • Eczema    • GERD (gastroesophageal reflux disease)    • Headache    • Hypertension    • Lung nodule    • Tobacco abuse        Allergies   Allergen Reactions   • Macrobid [Nitrofurantoin Macrocrystal] Hives and Shortness Of Breath       Past Surgical History:   Procedure Laterality Date   • BRONCHOSCOPY N/A 7/17/2019    Procedure: DARIN, RADIAL EBUS WITH FLUORO BRONCH;  Surgeon: Nikhil Knutson MD;  Location: [facility] ENDOSCOPY;  Service:  Pulmonary   • CHOLECYSTECTOMY  1991   • COLONOSCOPY  2016, 2017   • ENDOMETRIAL ABLATION W/ NOVASURE  2010    WITH LAP BTL   • VAGINAL HYSTERECTOMY  2010    Partial        Family History   Problem Relation Age of Onset   • Breast cancer Paternal Aunt         40's   • Cancer Mother    • Diabetes Father    • Diabetes Brother    • Cancer Maternal Aunt    • Ovarian cancer Neg Hx        Social History     Socioeconomic History   • Marital status:      Spouse name: Not on file   • Number of children: 1   • Years of education: Not on file   • Highest education level: Not on file   Occupational History   • Occupation: [employer]   Tobacco Use   • Smoking status: Current Every Day Smoker     Packs/day: 0.25     Years: 30.00     Pack years: 7.50     Types: Cigarettes   • Smokeless tobacco: Never Used   • Tobacco comment: 5 a day    Substance and Sexual Activity   • Alcohol use: Yes     Alcohol/week: 0.6 oz     Types: 1 Cans of beer per week     Comment: socially   • Drug use: No   • Sexual activity: Defer   Social History Narrative    Lives in [place].            Objective   Physical Exam   Constitutional: She appears well-developed and well-nourished. No distress.   HENT:   Head: Normocephalic and atraumatic.   Eyes: EOM are normal. No scleral icterus.   Cardiovascular: Normal rate, regular rhythm and normal heart sounds. Exam reveals no gallop and no friction rub.   No murmur heard.  Pulmonary/Chest: Effort normal and breath sounds normal. No respiratory distress. She has no wheezes. She has no rales.   Abdominal: Normal appearance and bowel sounds are normal. She exhibits no distension. There is tenderness in the left upper quadrant and left lower quadrant. There is no rigidity, no rebound and no guarding.       Procedures           ED Course  ED Course as of Sep 25 1528   Wed Sep 25, 2019   0809 WBC: 7.90 [WT]   0809 Creatinine: (!) 0.54 [WT]   0809 Lipase: 18 [WT]   0809 Lactate: 1.7 [WT]   0846 Blood,  UA: (!) Moderate (2+) [WT]   0846 Patient reports chronic microscopic hematuria, followed by urology.  RBC, UA: (!) 3-6 [WT]   0848 CTAP:   Impression    No acute intra-abdominal or intrapelvic abnormality. Sigmoid  diverticulosis without evidence for acute diverticulitis. No loculated  fluid collection or intra-abdominal free air. Appendix appears normal  the right lower quadrant without inflammatory findings associated. No  obstructive uropathy or hydronephrosis evident.      [WT]      ED Course User Index  [WT] Akua Avila PA-C                  MDM  Number of Diagnoses or Management Options  Abdominal pain, unspecified abdominal location:   Nausea and vomiting, intractability of vomiting not specified, unspecified vomiting type:   Diagnosis management comments: Patient presents complaining of left lower quadrant abdominal pain.  Differential diagnosis includes diverticulitis, colitis, UTI.  Plan to obtain CBC, CMP, urinalysis, lipase, lactate, CT abdomen pelvis.  We will treat her pain and nausea and reassess.  CT abdomen and pelvis is negative.  Patient's labs and urinalysis are within normal limits.  She is improved after Bentyl.  She is tolerating p.o.  Patient was given return precautions and follow-up with her PCP for reevaluation.  She is agreeable to plan.  Prescriptions for Zofran and Bentyl given.      Final diagnoses:   Abdominal pain, unspecified abdominal location   Nausea and vomiting, intractability of vomiting not specified, unspecified vomiting type              Akua Avila PA-C  09/25/19 7551

## 2019-10-08 ENCOUNTER — OFFICE VISIT (OUTPATIENT)
Dept: CARDIAC SURGERY | Facility: CLINIC | Age: 44
End: 2019-10-08

## 2019-10-08 VITALS
SYSTOLIC BLOOD PRESSURE: 123 MMHG | HEART RATE: 88 BPM | OXYGEN SATURATION: 99 % | TEMPERATURE: 98.8 F | WEIGHT: 172 LBS | HEIGHT: 62 IN | DIASTOLIC BLOOD PRESSURE: 81 MMHG | BODY MASS INDEX: 31.65 KG/M2

## 2019-10-08 DIAGNOSIS — R91.8 MASS OF LOWER LOBE OF LEFT LUNG: Primary | ICD-10-CM

## 2019-10-08 PROCEDURE — 99212 OFFICE O/P EST SF 10 MIN: CPT | Performed by: PHYSICIAN ASSISTANT

## 2019-10-08 NOTE — PROGRESS NOTES
10/08/2019  Patient Information  Mishel Freeman                                                                                          [address]  formerly [facility] [address]   1975  'PCP/Referring Physician'  Anastacia Jose MD  [phone]  No ref. provider found    Chief Complaint   Patient presents with   • Follow-up     1 month follow up after navigational broncoscopy for a left lower lobe lung nodule.   • Lung Nodule       History of Present Illness:  Patient is a 44-year-old  female with a history of hypertension and active tobacco abuse who presents to office for one-month follow-up to discuss results of navigational bronchoscopy with biopsy for a left lower lobe lung nodule.  Initially, the patient had left flank pain that prompted a CT scan of the abdomen.  This revealed an incidental left lower lobe lung mass.  Her left flank pain had been intermittently present for years.  She denies any symptoms including cough, hemoptysis, weight loss, lymphadenopathy, fevers or chills.  She is followed closely by her pulmonologist, Dr. Mo.    Patient Active Problem List   Diagnosis   • Microscopic hematuria   • Mass of lower lobe of left lung   • Smoker   • Current smoker   • Nodule of lower lobe of left lung, 2.2 cm noncalcified     Past Medical History:   Diagnosis Date   • Anxiety    • Depression    • Diverticulitis    • Eczema    • GERD (gastroesophageal reflux disease)    • Headache    • Hypertension    • Lung nodule    • Tobacco abuse      Past Surgical History:   Procedure Laterality Date   • BRONCHOSCOPY N/A 7/17/2019    Procedure: DARIN, RADIAL EBUS WITH FLUORO BRONCH;  Surgeon: Nikhil Knutson MD;  Location: [facility] ENDOSCOPY;  Service: Pulmonary   • CHOLECYSTECTOMY  1991   • COLONOSCOPY  2016, 2017   • ENDOMETRIAL ABLATION W/ NOVASURE  2010    WITH LAP BTL   • VAGINAL HYSTERECTOMY  2010    Partial        Current Outpatient Medications:   •  amitriptyline (ELAVIL) 50 MG  tablet, Take 50 mg by mouth At Night As Needed for Sleep., Disp: , Rfl:   •  aspirin-acetaminophen-caffeine (EXCEDRIN MIGRAINE) 250-250-65 MG per tablet, Take 1 tablet by mouth Every 6 (Six) Hours As Needed for Headache., Disp: , Rfl:   •  desvenlafaxine (PRISTIQ) 50 MG 24 hr tablet, Take 50 mg by mouth Daily., Disp: , Rfl:   •  dicyclomine (BENTYL) 20 MG tablet, Take 1 tablet by mouth Every 8 (Eight) Hours As Needed (pain)., Disp: 15 tablet, Rfl: 0  •  lisinopril-hydrochlorothiazide (PRINZIDE,ZESTORETIC) 20-12.5 MG per tablet, Take 1 tablet by mouth Daily., Disp: , Rfl:   •  LORazepam (ATIVAN) 1 MG tablet, Take 1 mg by mouth 2 (Two) Times a Day As Needed for Anxiety., Disp: , Rfl:   •  ondansetron ODT (ZOFRAN-ODT) 4 MG disintegrating tablet, Take 1 tablet by mouth Every 8 (Eight) Hours As Needed for Nausea or Vomiting., Disp: 14 tablet, Rfl: 0  Allergies   Allergen Reactions   • Macrobid [Nitrofurantoin Macrocrystal] Hives and Shortness Of Breath     Social History     Socioeconomic History   • Marital status:      Spouse name: Not on file   • Number of children: 1   • Years of education: Not on file   • Highest education level: Not on file   Occupational History   • Occupation: [employer]   Tobacco Use   • Smoking status: Current Every Day Smoker     Packs/day: 0.25     Years: 30.00     Pack years: 7.50     Types: Cigarettes   • Smokeless tobacco: Never Used   • Tobacco comment: 5 a day    Substance and Sexual Activity   • Alcohol use: Yes     Alcohol/week: 0.6 oz     Types: 1 Cans of beer per week     Comment: socially   • Drug use: No   • Sexual activity: Defer   Social History Narrative    Lives in [place].      Family History   Problem Relation Age of Onset   • Breast cancer Paternal Aunt         40's   • Cancer Mother    • Diabetes Father    • Diabetes Brother    • Cancer Maternal Aunt    • Ovarian cancer Neg Hx      Review of Systems   Constitution: Negative. Negative for chills, fever,  "malaise/fatigue, night sweats and weight loss.   HENT: Negative for hearing loss, odynophagia and sore throat.    Eyes: Negative.    Cardiovascular: Positive for dyspnea on exertion. Negative for chest pain, leg swelling, orthopnea and palpitations.   Respiratory: Negative.  Negative for cough and hemoptysis.    Endocrine: Negative.  Negative for cold intolerance, heat intolerance, polydipsia, polyphagia and polyuria.   Hematologic/Lymphatic: Bruises/bleeds easily.   Skin: Negative.  Negative for itching and rash.   Musculoskeletal: Negative.  Negative for joint pain, joint swelling and myalgias.   Gastrointestinal: Positive for abdominal pain. Negative for constipation, diarrhea, hematemesis, hematochezia, melena, nausea and vomiting.   Genitourinary: Positive for hematuria. Negative for dysuria and frequency.   Neurological: Positive for headaches. Negative for focal weakness, numbness and seizures.   Psychiatric/Behavioral: Negative.  Negative for suicidal ideas.   Allergic/Immunologic: Negative.    All other systems reviewed and are negative.    Vitals:    10/08/19 0843   BP: 123/81   BP Location: Right arm   Patient Position: Sitting   Pulse: 88   Temp: 98.8 °F (37.1 °C)   SpO2: 99%   Weight: 78 kg (172 lb)   Height: 157.5 cm (62\")      Physical Exam  Gen - NAD, pleasant, cooperative  CV -regular rate and rhythm, no murmur gallop or rub  Pulm -lungs clear to auscultation bilateral without wheeze or rhonchi  GI -soft, normoactive bowel sounds, nontender  Ext - without edema.   Incision -healing well, no evidence of incisional dehiscence or cellulitis  Neuro - CN II - XII grossly intact, tongue midline, voice normal    Assessment/Plan:  Patient is a 44-year-old  female with a history of hypertension and active tobacco abuse who presents to office for one-month follow-up to discuss results of navigational bronchoscopy with biopsy for a left lower lobe lung nodule.  Left lower lobe transbronchial biopsy as " well as bronchial brush and lavage were all negative for malignant cells.  These results were discussed in detail with the patient and all questions were answered to her satisfaction.  The patient is scheduled to follow-up with her pulmonologist, Dr. Mo on 10/24/2019.  At this time with no growth of the nodule shown between scans and negative transbronchial biopsies, the patient has elected to monitor the nodule with serial CT scans before pursuing a surgical option.  We will plan to see the patient back in 3 months to review results of her repeat CT scan of the chest.  She may call our office with any questions or concerns should any arise during the interval.    Patient Active Problem List   Diagnosis   • Microscopic hematuria   • Mass of lower lobe of left lung   • Smoker   • Current smoker   • Nodule of lower lobe of left lung, 2.2 cm noncalcified

## 2019-10-24 ENCOUNTER — OFFICE VISIT (OUTPATIENT)
Dept: PULMONOLOGY | Facility: CLINIC | Age: 44
End: 2019-10-24

## 2019-10-24 VITALS
TEMPERATURE: 99.2 F | WEIGHT: 172.38 LBS | HEART RATE: 92 BPM | RESPIRATION RATE: 18 BRPM | SYSTOLIC BLOOD PRESSURE: 122 MMHG | HEIGHT: 62 IN | DIASTOLIC BLOOD PRESSURE: 90 MMHG | BODY MASS INDEX: 31.72 KG/M2 | OXYGEN SATURATION: 98 %

## 2019-10-24 DIAGNOSIS — F17.200 CURRENT SMOKER: ICD-10-CM

## 2019-10-24 DIAGNOSIS — R91.1 NODULE OF LOWER LOBE OF LEFT LUNG: Primary | ICD-10-CM

## 2019-10-24 PROCEDURE — 99213 OFFICE O/P EST LOW 20 MIN: CPT | Performed by: NURSE PRACTITIONER

## 2019-10-24 NOTE — PROGRESS NOTES
[facility] Pulmonary Follow up    CHIEF COMPLAINT    Pulmonary nodule    HISTORY OF PRESENT ILLNESS    Mishel Freeman is a 44 y.o.female here today for follow-up of a pulmonary nodule.  She was last seen in the office in June by Dr. Mo.  She was referred to our office for an evaluation of a lung mass.  She had had an abdomen CT that showed an incidental 2.2 cm mass in the left lower lobe.  She had a follow-up CT scan of the chest that confirmed this.  She was referred to CT surgery as well for possible surgical removal.  She had a navigational bronchoscopy with EBUS on 7/17 per Dr. Knuston.  The pathology and cytology were negative for malignancy and her fungal and AFB cultures were all negative.  She followed up with Dr. Painting on 10/8 and it was recommended she have a repeat CT scan in 3 months to monitor the nodule.  She did not want to proceed with surgery at that time.    She denies fever, chills, sputum production, hemoptysis, night sweats, weight loss, chest pain or palpitations.  She denies any lower extremity edema.  She denies any sinus or allergy symptoms.  She denies reflux symptoms.    She continues to smoke a quarter of a pack per day.  She has a 7.5-pack-year history.  She does not have a stop date planned currently.    Patient Active Problem List   Diagnosis   • Microscopic hematuria   • Mass of lower lobe of left lung   • Smoker   • Current smoker   • Nodule of lower lobe of left lung, 2.2 cm noncalcified       Allergies   Allergen Reactions   • Macrobid [Nitrofurantoin Macrocrystal] Hives and Shortness Of Breath       Current Outpatient Medications:   •  amitriptyline (ELAVIL) 50 MG tablet, Take 50 mg by mouth At Night As Needed for Sleep., Disp: , Rfl:   •  aspirin-acetaminophen-caffeine (EXCEDRIN MIGRAINE) 250-250-65 MG per tablet, Take 1 tablet by mouth Every 6 (Six) Hours As Needed for Headache., Disp: , Rfl:   •  desvenlafaxine (PRISTIQ) 50 MG 24 hr tablet, Take 50 mg by mouth  Daily., Disp: , Rfl:   •  dicyclomine (BENTYL) 20 MG tablet, Take 1 tablet by mouth Every 8 (Eight) Hours As Needed (pain)., Disp: 15 tablet, Rfl: 0  •  lisinopril-hydrochlorothiazide (PRINZIDE,ZESTORETIC) 20-12.5 MG per tablet, Take 1 tablet by mouth Daily., Disp: , Rfl:   •  LORazepam (ATIVAN) 1 MG tablet, Take 1 mg by mouth 2 (Two) Times a Day As Needed for Anxiety., Disp: , Rfl:   •  ondansetron ODT (ZOFRAN-ODT) 4 MG disintegrating tablet, Take 1 tablet by mouth Every 8 (Eight) Hours As Needed for Nausea or Vomiting., Disp: 14 tablet, Rfl: 0  MEDICATION LIST AND ALLERGIES REVIEWED.    Social History     Tobacco Use   • Smoking status: Current Every Day Smoker     Packs/day: 0.25     Years: 30.00     Pack years: 7.50     Types: Cigarettes   • Smokeless tobacco: Never Used   • Tobacco comment: 5 a day    Substance Use Topics   • Alcohol use: Yes     Alcohol/week: 0.6 oz     Types: 1 Cans of beer per week     Comment: socially   • Drug use: No       FAMILY AND SOCIAL HISTORY REVIEWED.    Review of Systems   Constitutional: Negative for activity change, appetite change, fatigue, fever and unexpected weight change.   HENT: Negative for congestion, postnasal drip, rhinorrhea, sinus pressure, sore throat and voice change.    Eyes: Negative for visual disturbance.   Respiratory: Positive for shortness of breath. Negative for cough, chest tightness and wheezing.    Cardiovascular: Negative for chest pain, palpitations and leg swelling.   Gastrointestinal: Positive for abdominal pain. Negative for abdominal distention, nausea and vomiting.   Endocrine: Negative for cold intolerance and heat intolerance.   Genitourinary: Negative for difficulty urinating and urgency.   Musculoskeletal: Negative for arthralgias, back pain and neck pain.   Skin: Negative for color change and pallor.   Allergic/Immunologic: Negative for environmental allergies and food allergies.   Neurological: Negative for dizziness, syncope, weakness and  "light-headedness.   Hematological: Negative for adenopathy. Does not bruise/bleed easily.   Psychiatric/Behavioral: Negative for agitation and behavioral problems.   .    /90 (BP Location: Left arm, Patient Position: Sitting, Cuff Size: Adult)   Pulse 92   Temp 99.2 °F (37.3 °C)   Resp 18   Ht 157.5 cm (62\")   Wt 78.2 kg (172 lb 6 oz)   SpO2 98% Comment: room air at rest  BMI 31.53 kg/m²        There is no immunization history on file for this patient.    Physical Exam   Constitutional: She is oriented to person, place, and time. She appears well-developed and well-nourished.   HENT:   Head: Normocephalic and atraumatic.   Eyes: Pupils are equal, round, and reactive to light.   Neck: Normal range of motion. Neck supple. No thyromegaly present.   Cardiovascular: Normal rate, regular rhythm, normal heart sounds and intact distal pulses. Exam reveals no gallop and no friction rub.   No murmur heard.  Pulmonary/Chest: Effort normal and breath sounds normal. No respiratory distress. She has no wheezes. She has no rales. She exhibits no tenderness.   Abdominal: Soft. Bowel sounds are normal. There is no tenderness.   Musculoskeletal: Normal range of motion.   Lymphadenopathy:     She has no cervical adenopathy.   Neurological: She is alert and oriented to person, place, and time.   Skin: Skin is warm and dry. Capillary refill takes less than 2 seconds. She is not diaphoretic.   Psychiatric: She has a normal mood and affect. Her behavior is normal.   Nursing note and vitals reviewed.        RESULTS      PROBLEM LIST    Problem List Items Addressed This Visit        Respiratory    Nodule of lower lobe of left lung, 2.2 cm noncalcified - Primary       Other    Current smoker            DISCUSSION  Ms. Freeman was here for follow-up after her bronchoscopy that was performed in July.  She has decided that she would like to have a repeat CT scan in 3 months for further evaluation of this nodule.  We discussed the " possibility of having a CT scan in 3 months or surgical removal of the nodule today in the office.  She would like to avoid surgery if possible.    Advise her to call us after her CT scan if she decides to not pursue surgical removal of the nodule.  At that time we will follow her for 2 years stability of the nodule.  She is agreeable to this plan.    We discussed smoking cessation for 3 to 10 minutes in the office today.  She is not interested in any medication aids at this time.  She does not have a stop date planned currently.    She will follow-up after her CT scan is performed in December per Dr. Painting's office.  She will call if she has any new symptoms  I spent 15 minutes with the patient. I spent > 50% percent of this time counseling and discussing diagnosis, prognosis, diagnostic testing, evaluation, current status, treatment options and management.    Little David, PATY  10/24/42279:35 PM  Electronically signed     Please note that portions of this note were completed with a voice recognition program. Efforts were made to edit the dictations, but occasionally words are mistranscribed.      CC: Anastacia Jose MD

## 2019-11-09 ENCOUNTER — HOSPITAL ENCOUNTER (EMERGENCY)
Facility: HOSPITAL | Age: 44
Discharge: HOME | End: 2019-11-09
Attending: EMERGENCY MEDICINE | Admitting: EMERGENCY MEDICINE

## 2019-11-09 VITALS
WEIGHT: 170 LBS | TEMPERATURE: 98.7 F | HEART RATE: 102 BPM | BODY MASS INDEX: 31.28 KG/M2 | DIASTOLIC BLOOD PRESSURE: 78 MMHG | OXYGEN SATURATION: 99 % | HEIGHT: 62 IN | RESPIRATION RATE: 16 BRPM | SYSTOLIC BLOOD PRESSURE: 147 MMHG

## 2019-11-09 DIAGNOSIS — L72.3 INFECTED SEBACEOUS CYST: Primary | ICD-10-CM

## 2019-11-09 DIAGNOSIS — L08.9 INFECTED SEBACEOUS CYST: Primary | ICD-10-CM

## 2019-11-09 PROCEDURE — 87205 SMEAR GRAM STAIN: CPT | Performed by: NURSE PRACTITIONER

## 2019-11-09 PROCEDURE — 87070 CULTURE OTHR SPECIMN AEROBIC: CPT | Performed by: NURSE PRACTITIONER

## 2019-11-09 PROCEDURE — 87147 CULTURE TYPE IMMUNOLOGIC: CPT | Performed by: NURSE PRACTITIONER

## 2019-11-09 PROCEDURE — 99283 EMERGENCY DEPT VISIT LOW MDM: CPT

## 2019-11-09 RX ORDER — BACITRACIN, NEOMYCIN, POLYMYXIN B 400; 3.5; 5 [USP'U]/G; MG/G; [USP'U]/G
1 OINTMENT TOPICAL ONCE
Status: COMPLETED | OUTPATIENT
Start: 2019-11-09 | End: 2019-11-09

## 2019-11-09 RX ORDER — CLINDAMYCIN HYDROCHLORIDE 300 MG/1
300 CAPSULE ORAL 4 TIMES DAILY
Qty: 40 CAPSULE | Refills: 0 | Status: SHIPPED | OUTPATIENT
Start: 2019-11-09 | End: 2019-12-05

## 2019-11-09 RX ADMIN — BACITRACIN, NEOMYCIN, POLYMYXIN B 1 APPLICATION: 400; 3.5; 5 OINTMENT TOPICAL at 12:58

## 2019-11-09 RX ADMIN — Medication 3 ML: at 11:30

## 2019-11-09 RX ADMIN — LIDOCAINE HYDROCHLORIDE 10 ML: 10; .005 INJECTION, SOLUTION EPIDURAL; INFILTRATION; INTRACAUDAL; PERINEURAL at 11:43

## 2019-11-09 NOTE — ED PROVIDER NOTES
Subjective   Mishel Freeman is a 44 y.o. female who presents to the ED with complaints of an abscess to her posterior left shoulder. She states that she first began to notice it a couple months ago, but it has been worsening the past couple of days. She notes that the site has been increased in redness and pain. She denies fever. She also denies any know bites or lacerations to that area. There are no other acute complaints at this time.         History provided by:  Patient  Abscess   Abscess location: left posterior shoulder.  Progression:  Worsening  Chronicity:  New  Associated symptoms: no fever        Review of Systems   Constitutional: Negative for fever.   Skin:        Abscess to left posterior shoulder.   All other systems reviewed and are negative.      Past Medical History:   Diagnosis Date   • Anxiety    • Depression    • Diverticulitis    • Eczema    • GERD (gastroesophageal reflux disease)    • Headache    • Hypertension    • Lung nodule    • Tobacco abuse        Allergies   Allergen Reactions   • Macrobid [Nitrofurantoin Macrocrystal] Hives and Shortness Of Breath       Past Surgical History:   Procedure Laterality Date   • BRONCHOSCOPY N/A 7/17/2019    Procedure: DARIN, RADIAL EBUS WITH FLUORO BRONCH;  Surgeon: Nikhil Knutson MD;  Location: [facility] ENDOSCOPY;  Service: Pulmonary   • CHOLECYSTECTOMY  1991   • COLONOSCOPY  2016, 2017   • ENDOMETRIAL ABLATION W/ NOVASURE  2010    WITH LAP BTL   • VAGINAL HYSTERECTOMY  2010    Partial        Family History   Problem Relation Age of Onset   • Breast cancer Paternal Aunt         40's   • Cancer Mother    • Diabetes Father    • Diabetes Brother    • Cancer Maternal Aunt    • Ovarian cancer Neg Hx        Social History     Socioeconomic History   • Marital status:      Spouse name: Not on file   • Number of children: 1   • Years of education: Not on file   • Highest education level: Not on file   Occupational History   • Occupation: [employer]   Tobacco Use   • Smoking status: Current Every Day Smoker     Packs/day: 0.25     Years: 30.00     Pack years: 7.50     Types: Cigarettes   • Smokeless tobacco: Never Used   • Tobacco comment: 5 a day    Substance and Sexual Activity   • Alcohol use: Yes     Alcohol/week: 0.6 oz     Types: 1 Cans of beer per week     Comment: socially   • Drug use: No   • Sexual activity: Defer   Social History Narrative    Lives in [place].          Objective   Physical Exam   Constitutional: She is oriented to person, place, and time. She appears well-developed and well-nourished.   HENT:   Head: Normocephalic and atraumatic.   Eyes: Conjunctivae are normal. No scleral icterus.   Neck: Normal range of motion. Neck supple.   Cardiovascular: Normal rate, regular rhythm and normal heart sounds.   Pulmonary/Chest: Effort normal and breath sounds normal.   Abdominal: Soft. There is no tenderness.   Musculoskeletal: Normal range of motion.   Neurological: She is alert and oriented to person, place, and time.   Skin: Skin is warm and dry. There is erythema.   2 cm round indurated, erythematous abscess to left posterior shoulder. Significant tenderness with no visible drainage or skin injury. No lymphangitis.    Psychiatric: She has a normal mood and affect. Her behavior is normal.   Nursing note and vitals reviewed.      Incision & Drainage  Date/Time: 11/9/2019 12:27 PM  Performed by: Cheri Ryan APRN  Authorized by: Jose Angel Reyes MD     Consent:     Consent obtained:  Verbal    Consent given by:  Patient    Risks discussed:  Incomplete drainage, infection and bleeding  Location:     Type:  Cyst    Size:  3cm    Location:  Upper extremity    Upper extremity location:  Shoulder    Shoulder location:  L shoulder  Pre-procedure details:     Skin preparation:  Betadine  Anesthesia (see MAR for exact dosages):     Anesthesia method:  Topical application and local infiltration    Topical anesthetic:  LET    Local anesthetic:   "Lidocaine 1% WITH epi  Procedure type:     Complexity:  Simple  Procedure details:     Incision types:  Single straight    Incision depth:  Dermal    Scalpel blade:  11    Wound management:  Probed and deloculated and irrigated with saline    Drainage:  Purulent (and chunky tan colored cystic material)    Drainage amount:  Moderate    Wound treatment:  Wound left open    Packing materials:  None  Post-procedure details:     Patient tolerance of procedure:  Tolerated well, no immediate complications  Comments:      Applied neosporin and covered with bandaid             ED Course       No results found for this or any previous visit (from the past 24 hour(s)).  Note: In addition to lab results from this visit, the labs listed above may include labs taken at another facility or during a different encounter within the last 24 hours. Please correlate lab times with ED admission and discharge times for further clarification of the services performed during this visit.    No orders to display     Vitals:    11/09/19 1103   BP: 147/78   Pulse: 102   Resp: 16   Temp: 98.7 °F (37.1 °C)   TempSrc: Oral   SpO2: 99%   Weight: 77.1 kg (170 lb)   Height: 157.5 cm (62\")     Medications   neomycin-bacitracin-polymyxin (NEOSPORIN) ointment 1 application (not administered)   lidocaine-epinephrine-tetracaine (LET) topical gel 3 mL (3 mL Topical Given 11/9/19 1130)   lidocaine-EPINEPHrine (XYLOCAINE W/EPI) 1 %-1:[id number] injection 10 mL (10 mL Injection Given 11/9/19 1143)     ECG/EMG Results (last 24 hours)     ** No results found for the last 24 hours. **        No orders to display       Advised pt to keep area clean and dry and take meds as dir. F/U with pcp or derm in 3 days or return to ed with worsening of symptoms or dvlpmt of fever, increased redness, red streaking etc and Prn. Pt verb understanding.               MDM    Final diagnoses:   Infected sebaceous cyst       Documentation assistance provided by marine [employer]" Radha.  Information recorded by the scribe was done at my direction and has been verified and validated by me.     Lorri Garcia  11/09/19 1131       Cheri Ryan APRN  11/09/19 1236

## 2019-11-12 LAB
BACTERIA SPEC AEROBE CULT: NORMAL
GRAM STN SPEC: NORMAL

## 2019-12-05 ENCOUNTER — APPOINTMENT (OUTPATIENT)
Dept: CT IMAGING | Facility: HOSPITAL | Age: 44
End: 2019-12-05

## 2019-12-05 ENCOUNTER — APPOINTMENT (OUTPATIENT)
Dept: GENERAL RADIOLOGY | Facility: HOSPITAL | Age: 44
End: 2019-12-05

## 2019-12-05 ENCOUNTER — HOSPITAL ENCOUNTER (INPATIENT)
Facility: HOSPITAL | Age: 44
LOS: 5 days | Discharge: HOME | End: 2019-12-10
Attending: EMERGENCY MEDICINE | Admitting: HOSPITALIST

## 2019-12-05 DIAGNOSIS — K45.8 INTERNAL HERNIA: Primary | ICD-10-CM

## 2019-12-05 DIAGNOSIS — R10.13 EPIGASTRIC PAIN: ICD-10-CM

## 2019-12-05 DIAGNOSIS — A41.9 SEPSIS, DUE TO UNSPECIFIED ORGANISM, UNSPECIFIED WHETHER ACUTE ORGAN DYSFUNCTION PRESENT (HCC): ICD-10-CM

## 2019-12-05 DIAGNOSIS — R11.2 INTRACTABLE VOMITING WITH NAUSEA, UNSPECIFIED VOMITING TYPE: ICD-10-CM

## 2019-12-05 PROBLEM — R65.10 SIRS (SYSTEMIC INFLAMMATORY RESPONSE SYNDROME) (HCC): Status: ACTIVE | Noted: 2019-12-05

## 2019-12-05 PROBLEM — R10.9 ABDOMINAL PAIN: Status: ACTIVE | Noted: 2019-12-05

## 2019-12-05 PROBLEM — D72.829 LEUKOCYTOSIS: Status: ACTIVE | Noted: 2019-12-05

## 2019-12-05 PROBLEM — K56.609 BOWEL OBSTRUCTION (HCC): Status: ACTIVE | Noted: 2019-12-05

## 2019-12-05 PROBLEM — I10 HTN (HYPERTENSION): Status: ACTIVE | Noted: 2019-12-05

## 2019-12-05 PROBLEM — E87.20 LACTIC ACIDOSIS: Status: ACTIVE | Noted: 2019-12-05

## 2019-12-05 LAB
ALBUMIN SERPL-MCNC: 4.6 G/DL (ref 3.5–5.2)
ALBUMIN/GLOB SERPL: 1.4 G/DL
ALP SERPL-CCNC: 48 U/L (ref 39–117)
ALT SERPL W P-5'-P-CCNC: 19 U/L (ref 1–33)
ANION GAP SERPL CALCULATED.3IONS-SCNC: 20 MMOL/L (ref 5–15)
AST SERPL-CCNC: 15 U/L (ref 1–32)
BACTERIA UR QL AUTO: ABNORMAL /HPF
BASOPHILS # BLD AUTO: 0.03 10*3/MM3 (ref 0–0.2)
BASOPHILS NFR BLD AUTO: 0.2 % (ref 0–1.5)
BILIRUB SERPL-MCNC: 0.5 MG/DL (ref 0.2–1.2)
BILIRUB UR QL STRIP: NEGATIVE
BUN BLD-MCNC: 14 MG/DL (ref 6–20)
BUN/CREAT SERPL: 21.2 (ref 7–25)
CALCIUM SPEC-SCNC: 9.7 MG/DL (ref 8.6–10.5)
CHLORIDE SERPL-SCNC: 99 MMOL/L (ref 98–107)
CLARITY UR: CLEAR
CO2 SERPL-SCNC: 20 MMOL/L (ref 22–29)
COLOR UR: YELLOW
CREAT BLD-MCNC: 0.66 MG/DL (ref 0.57–1)
D-LACTATE SERPL-SCNC: 3.6 MMOL/L (ref 0.5–2)
D-LACTATE SERPL-SCNC: 5.1 MMOL/L (ref 0.5–2)
DEPRECATED RDW RBC AUTO: 44.5 FL (ref 37–54)
EOSINOPHIL # BLD AUTO: 0.05 10*3/MM3 (ref 0–0.4)
EOSINOPHIL NFR BLD AUTO: 0.4 % (ref 0.3–6.2)
ERYTHROCYTE [DISTWIDTH] IN BLOOD BY AUTOMATED COUNT: 12.6 % (ref 12.3–15.4)
GFR SERPL CREATININE-BSD FRML MDRD: 97 ML/MIN/1.73
GLOBULIN UR ELPH-MCNC: 3.2 GM/DL
GLUCOSE BLD-MCNC: 121 MG/DL (ref 65–99)
GLUCOSE UR STRIP-MCNC: NEGATIVE MG/DL
HCT VFR BLD AUTO: 43.9 % (ref 34–46.6)
HGB BLD-MCNC: 14.6 G/DL (ref 12–15.9)
HGB UR QL STRIP.AUTO: ABNORMAL
HOLD SPECIMEN: NORMAL
HYALINE CASTS UR QL AUTO: ABNORMAL /LPF
IMM GRANULOCYTES # BLD AUTO: 0.06 10*3/MM3 (ref 0–0.05)
IMM GRANULOCYTES NFR BLD AUTO: 0.4 % (ref 0–0.5)
KETONES UR QL STRIP: ABNORMAL
LEUKOCYTE ESTERASE UR QL STRIP.AUTO: NEGATIVE
LIPASE SERPL-CCNC: 16 U/L (ref 13–60)
LYMPHOCYTES # BLD AUTO: 0.9 10*3/MM3 (ref 0.7–3.1)
LYMPHOCYTES NFR BLD AUTO: 6.3 % (ref 19.6–45.3)
MAGNESIUM SERPL-MCNC: 1.6 MG/DL (ref 1.6–2.6)
MCH RBC QN AUTO: 31.9 PG (ref 26.6–33)
MCHC RBC AUTO-ENTMCNC: 33.3 G/DL (ref 31.5–35.7)
MCV RBC AUTO: 96.1 FL (ref 79–97)
MONOCYTES # BLD AUTO: 0.43 10*3/MM3 (ref 0.1–0.9)
MONOCYTES NFR BLD AUTO: 3 % (ref 5–12)
NEUTROPHILS # BLD AUTO: 12.73 10*3/MM3 (ref 1.7–7)
NEUTROPHILS NFR BLD AUTO: 89.7 % (ref 42.7–76)
NITRITE UR QL STRIP: NEGATIVE
NRBC BLD AUTO-RTO: 0 /100 WBC (ref 0–0.2)
PH UR STRIP.AUTO: <=5 [PH] (ref 5–8)
PLATELET # BLD AUTO: 376 10*3/MM3 (ref 140–450)
PMV BLD AUTO: 8.8 FL (ref 6–12)
POTASSIUM BLD-SCNC: 3.9 MMOL/L (ref 3.5–5.2)
PROCALCITONIN SERPL-MCNC: 0.2 NG/ML (ref 0.1–0.25)
PROT SERPL-MCNC: 7.8 G/DL (ref 6–8.5)
PROT UR QL STRIP: NEGATIVE
RBC # BLD AUTO: 4.57 10*6/MM3 (ref 3.77–5.28)
RBC # UR: ABNORMAL /HPF
REF LAB TEST METHOD: ABNORMAL
SODIUM BLD-SCNC: 139 MMOL/L (ref 136–145)
SP GR UR STRIP: 1.03 (ref 1–1.03)
SQUAMOUS #/AREA URNS HPF: ABNORMAL /HPF
TROPONIN T SERPL-MCNC: <0.01 NG/ML (ref 0–0.03)
UROBILINOGEN UR QL STRIP: ABNORMAL
WBC NRBC COR # BLD: 14.2 10*3/MM3 (ref 3.4–10.8)
WBC UR QL AUTO: ABNORMAL /HPF
WHOLE BLOOD HOLD SPECIMEN: NORMAL
WHOLE BLOOD HOLD SPECIMEN: NORMAL

## 2019-12-05 PROCEDURE — 25010000002 VANCOMYCIN 10 G RECONSTITUTED SOLUTION: Performed by: NURSE PRACTITIONER

## 2019-12-05 PROCEDURE — 25010000002 MORPHINE PER 10 MG: Performed by: EMERGENCY MEDICINE

## 2019-12-05 PROCEDURE — 87040 BLOOD CULTURE FOR BACTERIA: CPT | Performed by: EMERGENCY MEDICINE

## 2019-12-05 PROCEDURE — 99223 1ST HOSP IP/OBS HIGH 75: CPT | Performed by: INTERNAL MEDICINE

## 2019-12-05 PROCEDURE — 25010000002 ENOXAPARIN PER 10 MG: Performed by: INTERNAL MEDICINE

## 2019-12-05 PROCEDURE — 25010000002 HYDROMORPHONE PER 4 MG: Performed by: INTERNAL MEDICINE

## 2019-12-05 PROCEDURE — 25010000002 ONDANSETRON PER 1 MG: Performed by: INTERNAL MEDICINE

## 2019-12-05 PROCEDURE — 83690 ASSAY OF LIPASE: CPT | Performed by: EMERGENCY MEDICINE

## 2019-12-05 PROCEDURE — 71045 X-RAY EXAM CHEST 1 VIEW: CPT

## 2019-12-05 PROCEDURE — 25010000002 ONDANSETRON PER 1 MG: Performed by: NURSE PRACTITIONER

## 2019-12-05 PROCEDURE — 84145 PROCALCITONIN (PCT): CPT | Performed by: INTERNAL MEDICINE

## 2019-12-05 PROCEDURE — 83735 ASSAY OF MAGNESIUM: CPT | Performed by: INTERNAL MEDICINE

## 2019-12-05 PROCEDURE — 74177 CT ABD & PELVIS W/CONTRAST: CPT

## 2019-12-05 PROCEDURE — 25010000002 IOPAMIDOL 61 % SOLUTION: Performed by: EMERGENCY MEDICINE

## 2019-12-05 PROCEDURE — 25010000002 PIPERACILLIN SOD-TAZOBACTAM PER 1 G: Performed by: NURSE PRACTITIONER

## 2019-12-05 PROCEDURE — 85025 COMPLETE CBC W/AUTO DIFF WBC: CPT | Performed by: EMERGENCY MEDICINE

## 2019-12-05 PROCEDURE — 93005 ELECTROCARDIOGRAM TRACING: CPT

## 2019-12-05 PROCEDURE — 99284 EMERGENCY DEPT VISIT MOD MDM: CPT

## 2019-12-05 PROCEDURE — 80053 COMPREHEN METABOLIC PANEL: CPT | Performed by: EMERGENCY MEDICINE

## 2019-12-05 PROCEDURE — 25010000002 PROMETHAZINE PER 50 MG: Performed by: NURSE PRACTITIONER

## 2019-12-05 PROCEDURE — 93005 ELECTROCARDIOGRAM TRACING: CPT | Performed by: EMERGENCY MEDICINE

## 2019-12-05 PROCEDURE — G0378 HOSPITAL OBSERVATION PER HR: HCPCS

## 2019-12-05 PROCEDURE — 25010000002 ONDANSETRON PER 1 MG: Performed by: EMERGENCY MEDICINE

## 2019-12-05 PROCEDURE — 84484 ASSAY OF TROPONIN QUANT: CPT | Performed by: EMERGENCY MEDICINE

## 2019-12-05 PROCEDURE — 83605 ASSAY OF LACTIC ACID: CPT | Performed by: EMERGENCY MEDICINE

## 2019-12-05 PROCEDURE — 81001 URINALYSIS AUTO W/SCOPE: CPT | Performed by: EMERGENCY MEDICINE

## 2019-12-05 PROCEDURE — 99285 EMERGENCY DEPT VISIT HI MDM: CPT

## 2019-12-05 PROCEDURE — 25010000002 PIPERACILLIN SOD-TAZOBACTAM PER 1 G: Performed by: INTERNAL MEDICINE

## 2019-12-05 RX ORDER — MAGNESIUM SULFATE HEPTAHYDRATE 40 MG/ML
2 INJECTION, SOLUTION INTRAVENOUS AS NEEDED
Status: DISCONTINUED | OUTPATIENT
Start: 2019-12-05 | End: 2019-12-10 | Stop reason: HOSPADM

## 2019-12-05 RX ORDER — MAGNESIUM SULFATE HEPTAHYDRATE 40 MG/ML
4 INJECTION, SOLUTION INTRAVENOUS AS NEEDED
Status: DISCONTINUED | OUTPATIENT
Start: 2019-12-05 | End: 2019-12-10 | Stop reason: HOSPADM

## 2019-12-05 RX ORDER — PANTOPRAZOLE SODIUM 40 MG/10ML
40 INJECTION, POWDER, LYOPHILIZED, FOR SOLUTION INTRAVENOUS
Status: DISCONTINUED | OUTPATIENT
Start: 2019-12-06 | End: 2019-12-07 | Stop reason: ALTCHOICE

## 2019-12-05 RX ORDER — SODIUM CHLORIDE 0.9 % (FLUSH) 0.9 %
10 SYRINGE (ML) INJECTION AS NEEDED
Status: DISCONTINUED | OUTPATIENT
Start: 2019-12-05 | End: 2019-12-10 | Stop reason: HOSPADM

## 2019-12-05 RX ORDER — MORPHINE SULFATE 4 MG/ML
4 INJECTION, SOLUTION INTRAMUSCULAR; INTRAVENOUS ONCE
Status: COMPLETED | OUTPATIENT
Start: 2019-12-05 | End: 2019-12-05

## 2019-12-05 RX ORDER — HYDROMORPHONE HYDROCHLORIDE 1 MG/ML
0.5 INJECTION, SOLUTION INTRAMUSCULAR; INTRAVENOUS; SUBCUTANEOUS
Status: DISCONTINUED | OUTPATIENT
Start: 2019-12-05 | End: 2019-12-05

## 2019-12-05 RX ORDER — ACETAMINOPHEN, ASPIRIN AND CAFFEINE 250; 250; 65 MG/1; MG/1; MG/1
1 TABLET, FILM COATED ORAL EVERY 6 HOURS PRN
Status: DISCONTINUED | OUTPATIENT
Start: 2019-12-05 | End: 2019-12-10 | Stop reason: HOSPADM

## 2019-12-05 RX ORDER — MAGNESIUM SULFATE 1 G/100ML
1 INJECTION INTRAVENOUS AS NEEDED
Status: DISCONTINUED | OUTPATIENT
Start: 2019-12-05 | End: 2019-12-10 | Stop reason: HOSPADM

## 2019-12-05 RX ORDER — SODIUM CHLORIDE 9 MG/ML
75 INJECTION, SOLUTION INTRAVENOUS CONTINUOUS
Status: DISCONTINUED | OUTPATIENT
Start: 2019-12-05 | End: 2019-12-05

## 2019-12-05 RX ORDER — ACETAMINOPHEN 325 MG/1
650 TABLET ORAL ONCE
Status: COMPLETED | OUTPATIENT
Start: 2019-12-05 | End: 2019-12-05

## 2019-12-05 RX ORDER — HYDROMORPHONE HYDROCHLORIDE 1 MG/ML
0.5 INJECTION, SOLUTION INTRAMUSCULAR; INTRAVENOUS; SUBCUTANEOUS EVERY 4 HOURS PRN
Status: DISCONTINUED | OUTPATIENT
Start: 2019-12-05 | End: 2019-12-06

## 2019-12-05 RX ORDER — ONDANSETRON 2 MG/ML
4 INJECTION INTRAMUSCULAR; INTRAVENOUS EVERY 6 HOURS PRN
Status: DISCONTINUED | OUTPATIENT
Start: 2019-12-05 | End: 2019-12-10 | Stop reason: HOSPADM

## 2019-12-05 RX ORDER — NICOTINE 21 MG/24HR
1 PATCH, TRANSDERMAL 24 HOURS TRANSDERMAL EVERY 24 HOURS
Status: DISCONTINUED | OUTPATIENT
Start: 2019-12-05 | End: 2019-12-10 | Stop reason: HOSPADM

## 2019-12-05 RX ORDER — SODIUM CHLORIDE 0.9 % (FLUSH) 0.9 %
10 SYRINGE (ML) INJECTION EVERY 12 HOURS SCHEDULED
Status: DISCONTINUED | OUTPATIENT
Start: 2019-12-05 | End: 2019-12-10 | Stop reason: HOSPADM

## 2019-12-05 RX ORDER — CHOLECALCIFEROL (VITAMIN D3) 125 MCG
5 CAPSULE ORAL NIGHTLY PRN
Status: DISCONTINUED | OUTPATIENT
Start: 2019-12-05 | End: 2019-12-10 | Stop reason: HOSPADM

## 2019-12-05 RX ORDER — ONDANSETRON 2 MG/ML
4 INJECTION INTRAMUSCULAR; INTRAVENOUS ONCE
Status: COMPLETED | OUTPATIENT
Start: 2019-12-05 | End: 2019-12-05

## 2019-12-05 RX ORDER — PANTOPRAZOLE SODIUM 40 MG/10ML
40 INJECTION, POWDER, LYOPHILIZED, FOR SOLUTION INTRAVENOUS ONCE
Status: COMPLETED | OUTPATIENT
Start: 2019-12-05 | End: 2019-12-05

## 2019-12-05 RX ORDER — DESVENLAFAXINE SUCCINATE 50 MG/1
50 TABLET, EXTENDED RELEASE ORAL DAILY
Status: DISCONTINUED | OUTPATIENT
Start: 2019-12-05 | End: 2019-12-10 | Stop reason: HOSPADM

## 2019-12-05 RX ORDER — PROMETHAZINE HYDROCHLORIDE 25 MG/ML
12.5 INJECTION, SOLUTION INTRAMUSCULAR; INTRAVENOUS EVERY 6 HOURS PRN
Status: DISCONTINUED | OUTPATIENT
Start: 2019-12-05 | End: 2019-12-10 | Stop reason: HOSPADM

## 2019-12-05 RX ORDER — SODIUM CHLORIDE 9 MG/ML
125 INJECTION, SOLUTION INTRAVENOUS CONTINUOUS
Status: DISCONTINUED | OUTPATIENT
Start: 2019-12-05 | End: 2019-12-07

## 2019-12-05 RX ADMIN — ONDANSETRON 4 MG: 2 INJECTION INTRAMUSCULAR; INTRAVENOUS at 11:29

## 2019-12-05 RX ADMIN — SODIUM CHLORIDE 100 ML/HR: 9 INJECTION, SOLUTION INTRAVENOUS at 22:13

## 2019-12-05 RX ADMIN — ONDANSETRON 4 MG: 2 INJECTION INTRAMUSCULAR; INTRAVENOUS at 14:06

## 2019-12-05 RX ADMIN — IOPAMIDOL 95 ML: 612 INJECTION, SOLUTION INTRAVENOUS at 10:58

## 2019-12-05 RX ADMIN — PANTOPRAZOLE SODIUM 40 MG: 40 INJECTION, POWDER, FOR SOLUTION INTRAVENOUS at 14:06

## 2019-12-05 RX ADMIN — TAZOBACTAM SODIUM AND PIPERACILLIN SODIUM 3.38 G: 375; 3 INJECTION, SOLUTION INTRAVENOUS at 10:12

## 2019-12-05 RX ADMIN — SODIUM CHLORIDE 1000 ML: 9 INJECTION, SOLUTION INTRAVENOUS at 10:10

## 2019-12-05 RX ADMIN — ACETAMINOPHEN 650 MG: 325 TABLET ORAL at 10:14

## 2019-12-05 RX ADMIN — SODIUM CHLORIDE 1000 ML: 9 INJECTION, SOLUTION INTRAVENOUS at 09:39

## 2019-12-05 RX ADMIN — ONDANSETRON 4 MG: 2 INJECTION INTRAMUSCULAR; INTRAVENOUS at 09:37

## 2019-12-05 RX ADMIN — VANCOMYCIN HYDROCHLORIDE 1500 MG: 10 INJECTION, POWDER, LYOPHILIZED, FOR SOLUTION INTRAVENOUS at 11:17

## 2019-12-05 RX ADMIN — HYDROMORPHONE HYDROCHLORIDE 0.5 MG: 1 INJECTION, SOLUTION INTRAMUSCULAR; INTRAVENOUS; SUBCUTANEOUS at 15:49

## 2019-12-05 RX ADMIN — DESVENLAFAXINE SUCCINATE 50 MG: 50 TABLET, EXTENDED RELEASE ORAL at 15:40

## 2019-12-05 RX ADMIN — SODIUM CHLORIDE 100 ML/HR: 9 INJECTION, SOLUTION INTRAVENOUS at 14:06

## 2019-12-05 RX ADMIN — SODIUM CHLORIDE 500 ML: 9 INJECTION, SOLUTION INTRAVENOUS at 20:06

## 2019-12-05 RX ADMIN — TAZOBACTAM SODIUM AND PIPERACILLIN SODIUM 3.38 G: 375; 3 INJECTION, SOLUTION INTRAVENOUS at 23:58

## 2019-12-05 RX ADMIN — TAZOBACTAM SODIUM AND PIPERACILLIN SODIUM 3.38 G: 375; 3 INJECTION, SOLUTION INTRAVENOUS at 15:40

## 2019-12-05 RX ADMIN — MORPHINE SULFATE 4 MG: 4 INJECTION, SOLUTION INTRAMUSCULAR; INTRAVENOUS at 09:38

## 2019-12-05 RX ADMIN — ENOXAPARIN SODIUM 40 MG: 40 INJECTION SUBCUTANEOUS at 15:40

## 2019-12-05 RX ADMIN — PROMETHAZINE HYDROCHLORIDE 12.5 MG: 25 INJECTION INTRAMUSCULAR; INTRAVENOUS at 20:19

## 2019-12-05 NOTE — CONSULTS
"Mishel Freeman  [id number]  1975       Patient Care Team:  Anastacia Jose MD as PCP - General (Internal Medicine)  Lance Painting MD as Surgeon (Cardiothoracic Surgery)      General Surgery Consult  Date 12/5/2019  Consultant Dr. Jonathan Márquez    Chief complaint abdominal pain    Subjective     Patient is a 44 y.o. female presents with complaints of abdominal pain. Onset of symptoms was abrupt starting earlier this morning.  Patient has complained of diarrhea yesterday with nausea and progressed to vomiting this morning.  She had multiple episodes of vomiting.  This prompted the visit the emergency room.  She was noted to be tachycardic with a white blood count of 14,000 and a temperature of 100.2.  Lactic acid was elevated at 5.  CT scan of the abdomen pelvis was remarkable for concern about \"abnormal proximal small bowel\".  Patient received IV fluids admits feeling slightly better.  She works at an emergency room.  She denies having any previous similar complaints.  Abdominal surgeries include a laparoscopic cholecystectomy and partial hysterectomy.     Allergy:   Allergies   Allergen Reactions   • Macrobid [Nitrofurantoin Macrocrystal] Hives and Shortness Of Breath       Home Medications:   No current facility-administered medications on file prior to encounter.      Current Outpatient Medications on File Prior to Encounter   Medication Sig   • aspirin-acetaminophen-caffeine (EXCEDRIN MIGRAINE) 250-250-65 MG per tablet Take 1 tablet by mouth Every 6 (Six) Hours As Needed for Headache.   • desvenlafaxine (PRISTIQ) 50 MG 24 hr tablet Take 50 mg by mouth Daily.   • lisinopril-hydrochlorothiazide (PRINZIDE,ZESTORETIC) 20-12.5 MG per tablet Take 1 tablet by mouth Daily.   • amitriptyline (ELAVIL) 50 MG tablet Take 50 mg by mouth At Night As Needed for Sleep (pt hasnt taken this in months).   • [DISCONTINUED] clindamycin (CLEOCIN) 300 MG capsule Take 1 capsule by mouth 4 (Four) Times a Day.       PMHx: " "  Patient Active Problem List   Diagnosis   • Microscopic hematuria   • Mass of lower lobe of left lung   • Smoker   • Current smoker   • Nodule of lower lobe of left lung, 2.2 cm noncalcified   • Abdominal pain   • Lactic acidosis   • N&V (nausea and vomiting)   • HTN (hypertension)   • SIRS (systemic inflammatory response syndrome) (CMS/HCC)   • Leukocytosis       Past Surgical History:   Partial hysterectomy   laparoscopic cholecystectomy  Endometrial ablation  Colonoscopy  Bronchoscopy    Family History:  Family History   Problem Relation Age of Onset   • Breast cancer Paternal Aunt         40's   • Cancer Mother    • Diabetes Father    • Diabetes Brother    • Cancer Maternal Aunt    • Ovarian cancer Neg Hx        Social History:  Social History     Socioeconomic History   • Marital status:      Spouse name: Not on file   • Number of children: 1   • Years of education: Not on file   • Highest education level: Not on file   Occupational History   • Occupation: [employer]    Tobacco Use   • Smoking status: Current Every Day Smoker     Packs/day: 0.25     Years: 30.00     Pack years: 7.50     Types: Cigarettes   • Smokeless tobacco: Never Used   • Tobacco comment: 5 a day    Substance and Sexual Activity   • Alcohol use: Yes     Alcohol/week: 0.6 oz     Types: 1 Cans of beer per week     Comment: socially   • Drug use: No   • Sexual activity: Defer   Social History Narrative    Lives in [place].        Review of Systems   Pertinent items are noted in HPI      Physical Exam:    Objective     Vital Signs  Blood pressure 112/70, pulse 106, temperature 98.1 °F (36.7 °C), temperature source Oral, resp. rate 18, height 157.5 cm (62\"), weight 79.1 kg (174 lb 6.4 oz), SpO2 98 %, not currently breastfeeding.    Intake/Output Summary (Last 24 hours) at 12/5/2019 1700  Last data filed at 12/5/2019 1328  Gross per 24 hour   Intake 2550 ml   Output --   Net 2550 ml       Physical Exam:      General Appearance: "    Alert, cooperative, in no acute distress   Head:    Normocephalic, without obvious abnormality, atraumatic   Eyes:            Lids and lashes normal, conjunctivae and sclerae normal, no   icterus, no pallor, corneas clear, PERRLA   Ears:    Ears appear intact with no abnormalities noted       Neck:   No adenopathy, supple, trachea midline, no thyromegaly, no     carotid bruit, no JVD   Back:     No kyphosis present, no scoliosis present, no skin lesions,       erythema or scars, no tenderness to percussion or                   palpation,   range of motion normal   Lungs:     Clear to auscultation,respirations regular, even and                   unlabored    Heart:   Mild tachycardia with no murmurs noted   Breast Exam:    Deferred   Abdomen:    Nondistended and soft with mild upper abdominal tenderness to palpation with no diffuse guarding or peritoneal signs.  Hypoactive bowel sounds are noted.   Genitalia:    Deferred   Extremities:   Moves all extremities well, no edema, no cyanosis, no              redness   Pulses:   Pulses palpable and equal bilaterally   Skin:   No bleeding, bruising or rash   Lymph nodes:   No palpable adenopathy   Neurologic:   Cranial nerves 2 - 12 grossly intact, sensation intact, DTR        present and equal bilaterally       Results Review:    I reviewed the patient's new clinical results.  Results from last 7 days   Lab Units 12/05/19  0912   WBC 10*3/mm3 14.20*   HEMOGLOBIN g/dL 14.6   HEMATOCRIT % 43.9   PLATELETS 10*3/mm3 376     Results from last 7 days   Lab Units 12/05/19  0912   SODIUM mmol/L 139   POTASSIUM mmol/L 3.9   CHLORIDE mmol/L 99   CO2 mmol/L 20.0*   BUN mg/dL 14   CREATININE mg/dL 0.66   GLUCOSE mg/dL 121*   CALCIUM mg/dL 9.7       ASSESSMENT AND PLAN:   Patient presents with abdominal pain, nausea vomiting and diarrhea most likely secondary to underlying viral gastroenteritis.   I reviewed her CT scan with radiology.  There is no evidence of internal hernia or  small bowel obstruction.  I discussed the findings with the patient and reassured her.  Patient has been admitted to the hospitalist service for close observation and hydration.  Continue to monitor closely and maintain n.p.o. for now.        Lactic acidosis    Smoker    Current smoker    Nodule of lower lobe of left lung, 2.2 cm noncalcified    Abdominal pain    N&V (nausea and vomiting)    HTN (hypertension)    SIRS (systemic inflammatory response syndrome) (CMS/HCC)    Leukocytosis        I discussed the patients findings and my recommendations with patient, family, nursing staff and consulting provider.   Thank you for the consultation.    Andrew Loya MD  12/05/19  5:00 PM

## 2019-12-05 NOTE — H&P
"    [facility] Medicine Services  HISTORY AND PHYSICAL    Patient Name: Mishel Freeman  : 1975  MRN: [id number]  Primary Care Physician: Anastacia Jose MD  Date of admission: 2019      Subjective   Subjective     Chief Complaint:  abd pain, n/v    HPI:  Mishel Freeman is a 44 y.o. female smoker w/ hx of left lower lung field lung nodule (s/p ebus biopsy which was negative and is currently being monitored on serial imaging by pulmonary), prior ccy, htn, anxiety who presented with rather onset n/v & subseuqent epigastric pain which started ~ 02:30 this morning. Non-bloody emesis \"too many times to count\", no diarrhea today. Did had x 4 loose bm's yesterday (which isn't uncommon for patient). Does admit to alternating diarrhea and constipation for which has had previous upper and lower endoscopy (last ~ 2 years ago at [facility] which were \"normal\"). No recent etoh binge. Denies significant nsaid use. Upon arrival had low grade temp 100.2. Wbc 14,000. Lactate 5. Hr was 128. Initial ct a/p was read as abnormal appearing upper abdominal small bowel w/ possible swirling of mesenteric vessels. ED spoke w/ surgeon who recommended ng tube, npo, possible ex-lap and admission to hospitalist service. Subsequently, I viewed ct a/p with both Dr. GRANADO (surgery) and Radiologist who felt the ct a/p was unchanged from prior without significant abnormalities.    Review of Systems   No headache, no dyspnea, no chest pain, no rash    All other systems reviewed and are negative.     Personal History     Past Medical History:   Diagnosis Date   • Anxiety    • Depression    • Diverticulitis    • Eczema    • GERD (gastroesophageal reflux disease)    • Headache    • Hypertension    • Lung nodule    • Tobacco abuse        Past Surgical History:   Procedure Laterality Date   • BRONCHOSCOPY N/A 2019    Procedure: DARIN, RADIAL EBUS WITH FLUORO BRONCH;  Surgeon: iNkhil Knutson MD;  " Location: [facility] ENDOSCOPY;  Service: Pulmonary   • CHOLECYSTECTOMY  1991   • COLONOSCOPY  2016, 2017   • ENDOMETRIAL ABLATION W/ NOVASURE  2010    WITH LAP BTL   • VAGINAL HYSTERECTOMY  2010    Partial        Family History: family history includes Breast cancer in her paternal aunt; Cancer in her maternal aunt and mother; Diabetes in her brother and father. Otherwise pertinent FHx was reviewed and unremarkable.     Social History:  reports that she has been smoking cigarettes.  She has a 7.50 pack-year smoking history. She has never used smokeless tobacco. She reports that she drinks about 0.6 oz of alcohol per week. She reports that she does not use drugs.  Social History     Social History Narrative    Lives in [place].        Medications:  Available home medication information reviewed.    (Not in a hospital admission)    Allergies   Allergen Reactions   • Macrobid [Nitrofurantoin Macrocrystal] Hives and Shortness Of Breath       Objective   Objective     Vital Signs:   Temp:  [98.4 °F (36.9 °C)-100.1 °F (37.8 °C)] 98.4 °F (36.9 °C)  Heart Rate:  [102-129] 115  Resp:  [22-24] 22  BP: (102-144)/(70-95) 114/95        Physical Exam   Ill but nontoxic appearing, ox4, awake, alert, sitting up in bed  Ncat, oroph clear  Slightly tachycardic, regular rhythm  ctab  abd soft, moderate epigastric tenderness to palpation, soft, hypoactive but + bs, no rebound, no guarding  No cce  No extremity rash  Face symmetric, speech clear, equal   Normal affect    Results Reviewed:  I have personally reviewed current lab and radiology data.    Results from last 7 days   Lab Units 12/05/19  0912   WBC 10*3/mm3 14.20*   HEMOGLOBIN g/dL 14.6   HEMATOCRIT % 43.9   PLATELETS 10*3/mm3 376     Results from last 7 days   Lab Units 12/05/19  0912   SODIUM mmol/L 139   POTASSIUM mmol/L 3.9   CHLORIDE mmol/L 99   CO2 mmol/L 20.0*   BUN mg/dL 14   CREATININE mg/dL 0.66   GLUCOSE mg/dL 121*   CALCIUM mg/dL 9.7   ALT (SGPT) U/L 19   AST  (SGOT) U/L 15   TROPONIN T ng/mL <0.010   LACTATE mmol/L 5.1*     Estimated Creatinine Clearance: 103.5 mL/min (by C-G formula based on SCr of 0.66 mg/dL).  Brief Urine Lab Results  (Last result in the past 365 days)      Color   Clarity   Blood   Leuk Est   Nitrite   Protein   CREAT   Urine HCG        12/05/19 0913 Yellow Clear Moderate (2+) Negative Negative Negative             Imaging Results (Last 24 Hours)     Procedure Component Value Units Date/Time    CT Abdomen Pelvis With Contrast [[id number]] Collected:  12/05/19 1129     Updated:  12/05/19 1210    Narrative:       EXAMINATION: CT ABDOMEN PELVIS W CONTRAST- 12/05/2019     INDICATION: Abdominal pain, nausea, vomiting     TECHNIQUE: Contrast enhanced CT imaging of the abdomen and pelvis was  performed with additional coronal and sagittal reformatted imaging  provided for review. Additional delayed imaging of the abdomen and  pelvis in the axial plane was also provided for review.     The radiation dose reduction device was turned on for each scan per the  ALARA (As Low as Reasonably Achievable) protocol.     COMPARISON: CT of the abdomen and pelvis 09/25/2019.     FINDINGS: Visualized lower lungs do not demonstrate acute abnormality.  Scattered punctate granulomas are noted. Identified within the left  lower lobe is a 1.8 cm low-density nodule/lesion. This was previously  seen/evaluated with PET/CT exam performed 06/14/2019.     The liver is unremarkable in appearance. Portal vein appears patent.  Postcholecystectomy changes are identified with mild prominence of the  common bile duct, similar to 09/25/2019 consistent with a  postcholecystectomy state. The spleen is unremarkable. The adrenal  glands and pancreas do not demonstrate acute abnormality. The kidneys  enhance symmetrically without evidence of mass or obstruction. No  hydronephrosis. Urinary bladder is partially decompressed and otherwise  unremarkable. No pelvic mass identified. No free fluid  noted in the  pelvis. The retroperitoneum continues to demonstrate calcified and  noncalcified atherosclerotic plaquing of the abdominal aorta and its  branches, similar to the prior exam. No free air is identified. The  stomach demonstrates a small air-fluid level and is otherwise  unremarkable. The small bowel of the upper abdomen demonstrates a  slightly abnormal configuration and appears bunched within the midline  where there is some abnormal swirling of vessels. This appears  distinctly different when compared to 09/25/2019 however there is no  convincing evidence of bowel obstruction identified at this time and  this finding may be incidental although internal hernia is difficult to  entirely exclude. The remainder of the more distal small bowel is  unrevealing. No evidence of bowel obstruction. The appendix is normal.  Similar cystic changes to the bilateral ovaries with a right ovarian  calcification unchanged from prior exam. The remainder of the colon does  not demonstrate abnormality and is stool filled. Small umbilical hernia  is identified. Dense breast tissue of the lower breast is noted.  Thoracolumbar degenerative changes are identified. The bony pelvis  appears intact. Delayed imaging of abdomen and pelvis demonstrates  contrast within the ureters and urinary bladder without evidence of  obstruction.       Impression:       1. Abnormal configuration of the upper abdominal small bowel with  swirling of internal abdominal mesenteric vessels, new from 09/25/2019  which is of uncertain clinical significance, although an internal hernia  may be present. There is no evidence for bowel obstruction identified at  this time. Correlate clinically.     2. Nonspecific cystic changes to the bilateral ovaries, similar to  09/25/2019. The need for follow-up pelvic ultrasound should be  determined clinically.     3. Redemonstration of a left lower lobe pulmonary nodule as previously  described/evaluated on prior  "imaging as discussed above.     D:  12/05/2019  E:  12/05/2019       XR Chest 1 View [[id number]] Collected:  12/05/19 0955     Updated:  12/05/19 1009    Narrative:       EXAMINATION: XR CHEST 1 VW- 12/05/2019     INDICATION: Upper Abdominal Pain Triage Protocol      COMPARISON: Chest radiograph 07/17/2019     FINDINGS: Single portable chest radiograph radiograph was submitted for  review.     The heart is not enlarged. The lungs appear clear. No pleural effusion  or pneumothorax. Visualized upper abdomen is unrevealing. No acute  osseous abnormality is identified.           Impression:       No evidence of active airspace disease.     D:  12/05/2019  E:  12/05/2019                   Assessment/Plan   Assessment / Plan     Active Hospital Problems    Diagnosis POA   • **Lactic acidosis [E87.2] Yes     Priority: High   • SIRS (systemic inflammatory response syndrome) (CMS/HCC) [R65.10] Unknown     Priority: High   • Abdominal pain [R10.9] Yes   • N&V (nausea and vomiting) [R11.2] Unknown   • HTN (hypertension) [I10] Unknown   • Leukocytosis [D72.829] Unknown   • Nodule of lower lobe of left lung, 2.2 cm noncalcified [R91.1] Yes   • Current smoker [F17.200] Yes   • Smoker [F17.200] Yes     Less than 1 pk/week         Plan:  - ED discussed w/ Dr. GRANADO via phone and based upon the initial ct a/p the initial plan was possible ex-lap, ng tube, etc. However, I subsequently discussed w/ Dr. GRANADO & reviewed the ct a/p with Dr. GRANADO & radiologist. The CT a/p appears \"normal\" on repeat review. Leading the differential diagnosis currently is viral gastroenteritis, but we will follow clinically w/ exams, hydrate, follow temp curve and follow lactic acid. Dr. GRANADO to formally evaluate patient shortly.    -normal saline 100cc/hr  -follow lactate  -add procalcitonin to labs  -continue zosyn empiric therapy for now, if afebrile, cultures negative, procal ok then likely stop zosyn  -Dr. GRANADO following.    Am labs: cbc,cmp,mag    Dispo: " depending on clinical course, po intake, surgery recs possibly d/c home soon vs ? Surgery. Next 24-48 hours will tell much    DVT prophylaxis:  Sq lovenox    CODE STATUS:    Code Status and Medical Interventions:   Ordered at: 12/05/19 1322     Code Status:    CPR     Medical Interventions (Level of Support Prior to Arrest):    Full       Admission Status:  I believe this patient meets observation for now, reevaluate based on clinical course    Electronically signed by James Márquez MD, 12/05/19, 1:25 PM.

## 2019-12-05 NOTE — ED PROVIDER NOTES
Subjective   Mishel Freeman is a 84-year-old female that presents emergency department with complaints of abdominal pain.  Patient woke up at 2 AM with nausea vomiting.  She advises she is vomited every 10 minutes since.  Patient complains of upper abdominal pain at this time but advised it did not start until after she began vomiting.  Patient denies any diarrhea.  Patient's last bowel movement was 1 day ago.  Patient denies chest pain, shortness of breath.  She does have a temperature 100.1.  She denies any urinary frequency, burning, urgency but does confirm she is able to urinate.  Patient does drink alcohol occasionally.  Patient's last intake was on Sunday.        History provided by:  Patient  Abdominal Pain   Pain location:  LUQ, RUQ and epigastric  Pain quality: sharp and stabbing    Pain severity:  Moderate  Duration: started at 2 am.  Timing:  Constant  Progression:  Unchanged  Context: alcohol use    Relieved by:  Nothing  Worsened by:  Eating, movement and palpation  Associated symptoms: fever, nausea and vomiting    Associated symptoms: no chest pain, no chills, no cough, no diarrhea, no dysuria and no shortness of breath        Review of Systems   Constitutional: Positive for fever. Negative for chills.   Respiratory: Negative for cough and shortness of breath.    Cardiovascular: Negative for chest pain.   Gastrointestinal: Positive for abdominal pain, nausea and vomiting. Negative for diarrhea.   Genitourinary: Negative for difficulty urinating and dysuria.   Neurological: Negative for dizziness and weakness.   All other systems reviewed and are negative.      Past Medical History:   Diagnosis Date   • Anxiety    • Depression    • Diverticulitis    • Eczema    • GERD (gastroesophageal reflux disease)    • Headache    • Hypertension    • Lung nodule    • Tobacco abuse        Allergies   Allergen Reactions   • Macrobid [Nitrofurantoin Macrocrystal] Hives and Shortness Of Breath       Past Surgical  History:   Procedure Laterality Date   • BRONCHOSCOPY N/A 7/17/2019    Procedure: DARIN, RADIAL EBUS WITH FLUORO BRONCH;  Surgeon: Nikhil Knutson MD;  Location: [facility] ENDOSCOPY;  Service: Pulmonary   • CHOLECYSTECTOMY  1991   • COLONOSCOPY  2016, 2017   • ENDOMETRIAL ABLATION W/ NOVASURE  2010    WITH LAP BTL   • VAGINAL HYSTERECTOMY  2010    Partial        Family History   Problem Relation Age of Onset   • Breast cancer Paternal Aunt         40's   • Cancer Mother    • Diabetes Father    • Diabetes Brother    • Cancer Maternal Aunt    • Ovarian cancer Neg Hx        Social History     Socioeconomic History   • Marital status:      Spouse name: Not on file   • Number of children: 1   • Years of education: Not on file   • Highest education level: Not on file   Occupational History   • Occupation: [employer]   Tobacco Use   • Smoking status: Current Every Day Smoker     Packs/day: 0.25     Years: 30.00     Pack years: 7.50     Types: Cigarettes   • Smokeless tobacco: Never Used   • Tobacco comment: 5 a day    Substance and Sexual Activity   • Alcohol use: Yes     Alcohol/week: 0.6 oz     Types: 1 Cans of beer per week     Comment: socially   • Drug use: No   • Sexual activity: Defer   Social History Narrative    Lives in [place].            Objective   Physical Exam   Constitutional: She is oriented to person, place, and time. She appears well-developed and well-nourished. She is cooperative.  Non-toxic appearance. She appears ill.   HENT:   Head: Normocephalic and atraumatic.   Mouth/Throat: Oropharynx is clear and moist.   Eyes: Conjunctivae, EOM and lids are normal. Pupils are equal, round, and reactive to light.   Neck: Trachea normal, normal range of motion and full passive range of motion without pain.   Cardiovascular: Regular rhythm, normal heart sounds, intact distal pulses and normal pulses. Tachycardia present.   Pulmonary/Chest: Effort normal and breath sounds normal. No respiratory  distress. She has no decreased breath sounds. She has no wheezes. She has no rhonchi. She has no rales.   Abdominal: Soft. Normal appearance and bowel sounds are normal. There is tenderness in the right upper quadrant, epigastric area and left upper quadrant.   Musculoskeletal: Normal range of motion.   Neurological: She is alert and oriented to person, place, and time. She has normal strength. No cranial nerve deficit.   Skin: Skin is warm, dry and intact. No rash noted.   Psychiatric: She has a normal mood and affect. Her speech is normal and behavior is normal.       Procedures           ED Course  ED Course as of Dec 05 1243   Thu Dec 05, 2019   0956 Pt will be started on abx at this time.  Lactate: (!!) 5.1 [KG]   0956 WBC: (!) 14.20 [KG]   1211 Dr. GRANADO contacted, suggests NG tube and admit to the hospitalist. He will consult on the patient.  Dr GRANADO plans on taking the patient for an exploratory procedure.  [KG]   1214 Hospitalist paged.   [KG]   1239 Dr. Márquez contacted, agrees to admit the patient.   [KG]      ED Course User Index  [KG] Deanna Phipps, APRN        Recent Results (from the past 24 hour(s))   Comprehensive Metabolic Panel    Collection Time: 12/05/19  9:12 AM   Result Value Ref Range    Glucose 121 (H) 65 - 99 mg/dL    BUN 14 6 - 20 mg/dL    Creatinine 0.66 0.57 - 1.00 mg/dL    Sodium 139 136 - 145 mmol/L    Potassium 3.9 3.5 - 5.2 mmol/L    Chloride 99 98 - 107 mmol/L    CO2 20.0 (L) 22.0 - 29.0 mmol/L    Calcium 9.7 8.6 - 10.5 mg/dL    Total Protein 7.8 6.0 - 8.5 g/dL    Albumin 4.60 3.50 - 5.20 g/dL    ALT (SGPT) 19 1 - 33 U/L    AST (SGOT) 15 1 - 32 U/L    Alkaline Phosphatase 48 39 - 117 U/L    Total Bilirubin 0.5 0.2 - 1.2 mg/dL    eGFR Non African Amer 97 >60 mL/min/1.73    Globulin 3.2 gm/dL    A/G Ratio 1.4 g/dL    BUN/Creatinine Ratio 21.2 7.0 - 25.0    Anion Gap 20.0 (H) 5.0 - 15.0 mmol/L   Lipase    Collection Time: 12/05/19  9:12 AM   Result Value Ref Range    Lipase 16 13 - 60  U/L   Troponin    Collection Time: 12/05/19  9:12 AM   Result Value Ref Range    Troponin T <0.010 0.000 - 0.030 ng/mL   Light Blue Top    Collection Time: 12/05/19  9:12 AM   Result Value Ref Range    Extra Tube hold for add-on    Green Top (Gel)    Collection Time: 12/05/19  9:12 AM   Result Value Ref Range    Extra Tube Hold for add-ons.    Lavender Top    Collection Time: 12/05/19  9:12 AM   Result Value Ref Range    Extra Tube hold for add-on    Gold Top - SST    Collection Time: 12/05/19  9:12 AM   Result Value Ref Range    Extra Tube Hold for add-ons.    CBC Auto Differential    Collection Time: 12/05/19  9:12 AM   Result Value Ref Range    WBC 14.20 (H) 3.40 - 10.80 10*3/mm3    RBC 4.57 3.77 - 5.28 10*6/mm3    Hemoglobin 14.6 12.0 - 15.9 g/dL    Hematocrit 43.9 34.0 - 46.6 %    MCV 96.1 79.0 - 97.0 fL    MCH 31.9 26.6 - 33.0 pg    MCHC 33.3 31.5 - 35.7 g/dL    RDW 12.6 12.3 - 15.4 %    RDW-SD 44.5 37.0 - 54.0 fl    MPV 8.8 6.0 - 12.0 fL    Platelets 376 140 - 450 10*3/mm3    Neutrophil % 89.7 (H) 42.7 - 76.0 %    Lymphocyte % 6.3 (L) 19.6 - 45.3 %    Monocyte % 3.0 (L) 5.0 - 12.0 %    Eosinophil % 0.4 0.3 - 6.2 %    Basophil % 0.2 0.0 - 1.5 %    Immature Grans % 0.4 0.0 - 0.5 %    Neutrophils, Absolute 12.73 (H) 1.70 - 7.00 10*3/mm3    Lymphocytes, Absolute 0.90 0.70 - 3.10 10*3/mm3    Monocytes, Absolute 0.43 0.10 - 0.90 10*3/mm3    Eosinophils, Absolute 0.05 0.00 - 0.40 10*3/mm3    Basophils, Absolute 0.03 0.00 - 0.20 10*3/mm3    Immature Grans, Absolute 0.06 (H) 0.00 - 0.05 10*3/mm3    nRBC 0.0 0.0 - 0.2 /100 WBC   Lactic Acid, Plasma    Collection Time: 12/05/19  9:12 AM   Result Value Ref Range    Lactate 5.1 (C) 0.5 - 2.0 mmol/L   Urinalysis With Microscopic If Indicated (No Culture) - Urine, Clean Catch    Collection Time: 12/05/19  9:13 AM   Result Value Ref Range    Color, UA Yellow Yellow, Straw    Appearance, UA Clear Clear    pH, UA <=5.0 5.0 - 8.0    Specific Gravity, UA 1.026 1.001 - 1.030     Glucose, UA Negative Negative    Ketones, UA 15 mg/dL (1+) (A) Negative    Bilirubin, UA Negative Negative    Blood, UA Moderate (2+) (A) Negative    Protein, UA Negative Negative    Leuk Esterase, UA Negative Negative    Nitrite, UA Negative Negative    Urobilinogen, UA 0.2 E.U./dL 0.2 - 1.0 E.U./dL   Urinalysis, Microscopic Only - Urine, Clean Catch    Collection Time: 12/05/19  9:13 AM   Result Value Ref Range    RBC, UA 7-12 (A) None Seen, 0-2 /HPF    WBC, UA 0-2 None Seen, 0-2 /HPF    Bacteria, UA None Seen None Seen, Trace /HPF    Squamous Epithelial Cells, UA 3-6 (A) None Seen, 0-2 /HPF    Hyaline Casts, UA 0-6 0 - 6 /LPF    Methodology Automated Microscopy      Note: In addition to lab results from this visit, the labs listed above may include labs taken at another facility or during a different encounter within the last 24 hours. Please correlate lab times with ED admission and discharge times for further clarification of the services performed during this visit.    CT Abdomen Pelvis With Contrast   Preliminary Result   1. Abnormal configuration of the upper abdominal small bowel with   swirling of internal abdominal mesenteric vessels, new from 09/25/2019   which is of uncertain clinical significance, although an internal hernia   may be present. There is no evidence for bowel obstruction identified at   this time. Correlate clinically.       2. Nonspecific cystic changes to the bilateral ovaries, similar to   09/25/2019. The need for follow-up pelvic ultrasound should be   determined clinically.       3. Redemonstration of a left lower lobe pulmonary nodule as previously   described/evaluated on prior imaging as discussed above.       D:  12/05/2019   E:  12/05/2019          XR Chest 1 View   Preliminary Result   No evidence of active airspace disease.       D:  12/05/2019   E:  12/05/2019                Vitals:    12/05/19 1030 12/05/19 1117 12/05/19 1225 12/05/19 1230   BP: 115/76 115/81  115/83   BP  Location:  Right arm     Patient Position:  Lying     Pulse: 102 107 112 111   Resp:  22     Temp:  98.4 °F (36.9 °C)     TempSrc:  Oral     SpO2: 100% 100% 100% 100%   Weight:       Height:         Medications   sodium chloride 0.9 % flush 10 mL (not administered)   sodium chloride 0.9 % flush 10 mL (not administered)   sodium chloride 0.9 % bolus 1,000 mL (0 mL Intravenous Stopped 12/5/19 1117)   ondansetron (ZOFRAN) injection 4 mg (4 mg Intravenous Given 12/5/19 0937)   Morphine sulfate (PF) injection 4 mg (4 mg Intravenous Given 12/5/19 0938)   sodium chloride 0.9 % bolus 1,000 mL (0 mL Intravenous Stopped 12/5/19 1117)   acetaminophen (TYLENOL) tablet 650 mg (650 mg Oral Given 12/5/19 1014)   piperacillin-tazobactam (ZOSYN) 3.375 g in iso-osmotic dextrose 50 ml (premix) (0 g Intravenous Stopped 12/5/19 1042)   vancomycin 1500 mg/500 mL 0.9% NS IVPB ([facility]) (1,500 mg Intravenous New Bag 12/5/19 1117)   iopamidol (ISOVUE-300) 61 % injection 100 mL (95 mL Intravenous Given 12/5/19 1058)   ondansetron (ZOFRAN) injection 4 mg (4 mg Intravenous Given 12/5/19 1129)     ECG/EMG Results (last 24 hours)     Procedure Component Value Units Date/Time    ECG 12 Lead [[id number]] Collected:  12/05/19 0912     Updated:  12/05/19 1220    Narrative:       Test Reason : Upper Abdominal Pain Triage Protocol  Blood Pressure : **/** mmHG  Vent. Rate : 117 BPM     Atrial Rate : 117 BPM     P-R Int : 126 ms          QRS Dur : 084 ms      QT Int : 316 ms       P-R-T Axes : 009 067 077 degrees     QTc Int : 440 ms    Sinus tachycardia  Nonspecific T wave abnormality  Abnormal ECG  When compared with ECG of 16-JUL-2019 13:10,  Vent. rate has increased BY  44 BPM  Nonspecific T wave abnormality, worse in Lateral leads  Confirmed by ANTONIO MORENO MD ([id number]) on 12/5/2019 12:19:53 PM    Referred By:  EDMD           Confirmed By:ANTONIO MORENO MD        ECG 12 Lead   Final Result   Test Reason : Upper Abdominal Pain Triage Protocol   Blood  Pressure : **/** mmHG   Vent. Rate : 117 BPM     Atrial Rate : 117 BPM      P-R Int : 126 ms          QRS Dur : 084 ms       QT Int : 316 ms       P-R-T Axes : 009 067 077 degrees      QTc Int : 440 ms      Sinus tachycardia   Nonspecific T wave abnormality   Abnormal ECG   When compared with ECG of 16-JUL-2019 13:10,   Vent. rate has increased BY  44 BPM   Nonspecific T wave abnormality, worse in Lateral leads   Confirmed by TIFFANIE BURGOS, ANTONIO ([id number]) on 12/5/2019 12:19:53 PM      Referred By:  EDMD           Confirmed By:ANTONIO MORENO MD                    [facility]    Final diagnoses:   Internal hernia   Intractable vomiting with nausea, unspecified vomiting type   Sepsis, due to unspecified organism, unspecified whether acute organ dysfunction present (CMS/[facility])              Deanna Phipps, APRN  12/05/19 1241

## 2019-12-05 NOTE — PLAN OF CARE
Problem: Patient Care Overview  Goal: Plan of Care Review  Outcome: Ongoing (interventions implemented as appropriate)   12/05/19 1511   Coping/Psychosocial   Plan of Care Reviewed With patient   OTHER   Outcome Summary pt having nausea with vomiting, zofran given with effectiveness, pt resting well at this time with SO at bedside, will continue to monitor.

## 2019-12-06 LAB
ALBUMIN SERPL-MCNC: 3.2 G/DL (ref 3.5–5.2)
ALBUMIN/GLOB SERPL: 1.3 G/DL
ALP SERPL-CCNC: 30 U/L (ref 39–117)
ALT SERPL W P-5'-P-CCNC: 13 U/L (ref 1–33)
ANION GAP SERPL CALCULATED.3IONS-SCNC: 11 MMOL/L (ref 5–15)
AST SERPL-CCNC: 12 U/L (ref 1–32)
BILIRUB SERPL-MCNC: 0.3 MG/DL (ref 0.2–1.2)
BUN BLD-MCNC: 8 MG/DL (ref 6–20)
BUN/CREAT SERPL: 14 (ref 7–25)
CALCIUM SPEC-SCNC: 7.7 MG/DL (ref 8.6–10.5)
CHLORIDE SERPL-SCNC: 106 MMOL/L (ref 98–107)
CO2 SERPL-SCNC: 19 MMOL/L (ref 22–29)
CREAT BLD-MCNC: 0.57 MG/DL (ref 0.57–1)
D-LACTATE SERPL-SCNC: 1.5 MMOL/L (ref 0.5–2)
DEPRECATED RDW RBC AUTO: 46 FL (ref 37–54)
ERYTHROCYTE [DISTWIDTH] IN BLOOD BY AUTOMATED COUNT: 12.9 % (ref 12.3–15.4)
GFR SERPL CREATININE-BSD FRML MDRD: 115 ML/MIN/1.73
GLOBULIN UR ELPH-MCNC: 2.4 GM/DL
GLUCOSE BLD-MCNC: 99 MG/DL (ref 65–99)
HCT VFR BLD AUTO: 32.2 % (ref 34–46.6)
HGB BLD-MCNC: 10 G/DL (ref 12–15.9)
MAGNESIUM SERPL-MCNC: 1.7 MG/DL (ref 1.6–2.6)
MCH RBC QN AUTO: 30.9 PG (ref 26.6–33)
MCHC RBC AUTO-ENTMCNC: 31.1 G/DL (ref 31.5–35.7)
MCV RBC AUTO: 99.4 FL (ref 79–97)
PLATELET # BLD AUTO: 245 10*3/MM3 (ref 140–450)
PMV BLD AUTO: 9.1 FL (ref 6–12)
POTASSIUM BLD-SCNC: 3.1 MMOL/L (ref 3.5–5.2)
PROT SERPL-MCNC: 5.6 G/DL (ref 6–8.5)
RBC # BLD AUTO: 3.24 10*6/MM3 (ref 3.77–5.28)
SODIUM BLD-SCNC: 136 MMOL/L (ref 136–145)
WBC NRBC COR # BLD: 5.91 10*3/MM3 (ref 3.4–10.8)

## 2019-12-06 PROCEDURE — 25010000002 PROMETHAZINE PER 50 MG: Performed by: NURSE PRACTITIONER

## 2019-12-06 PROCEDURE — 25010000002 MAGNESIUM SULFATE 2 GM/50ML SOLUTION: Performed by: INTERNAL MEDICINE

## 2019-12-06 PROCEDURE — 85027 COMPLETE CBC AUTOMATED: CPT | Performed by: INTERNAL MEDICINE

## 2019-12-06 PROCEDURE — 80053 COMPREHEN METABOLIC PANEL: CPT | Performed by: INTERNAL MEDICINE

## 2019-12-06 PROCEDURE — 25010000002 PIPERACILLIN SOD-TAZOBACTAM PER 1 G: Performed by: INTERNAL MEDICINE

## 2019-12-06 PROCEDURE — 83735 ASSAY OF MAGNESIUM: CPT | Performed by: INTERNAL MEDICINE

## 2019-12-06 PROCEDURE — 83605 ASSAY OF LACTIC ACID: CPT | Performed by: INTERNAL MEDICINE

## 2019-12-06 PROCEDURE — 99232 SBSQ HOSP IP/OBS MODERATE 35: CPT | Performed by: HOSPITALIST

## 2019-12-06 PROCEDURE — 25010000002 ENOXAPARIN PER 10 MG: Performed by: INTERNAL MEDICINE

## 2019-12-06 PROCEDURE — 25010000002 ONDANSETRON PER 1 MG: Performed by: INTERNAL MEDICINE

## 2019-12-06 PROCEDURE — G0378 HOSPITAL OBSERVATION PER HR: HCPCS

## 2019-12-06 RX ORDER — POTASSIUM CHLORIDE 750 MG/1
40 CAPSULE, EXTENDED RELEASE ORAL AS NEEDED
Status: DISCONTINUED | OUTPATIENT
Start: 2019-12-06 | End: 2019-12-10 | Stop reason: HOSPADM

## 2019-12-06 RX ORDER — METRONIDAZOLE 500 MG/1
500 TABLET ORAL EVERY 8 HOURS SCHEDULED
Status: DISCONTINUED | OUTPATIENT
Start: 2019-12-06 | End: 2019-12-07

## 2019-12-06 RX ORDER — SACCHAROMYCES BOULARDII 250 MG
250 CAPSULE ORAL 2 TIMES DAILY
Status: DISCONTINUED | OUTPATIENT
Start: 2019-12-06 | End: 2019-12-10

## 2019-12-06 RX ORDER — POTASSIUM CHLORIDE 1.5 G/1.77G
40 POWDER, FOR SOLUTION ORAL AS NEEDED
Status: DISCONTINUED | OUTPATIENT
Start: 2019-12-06 | End: 2019-12-10 | Stop reason: HOSPADM

## 2019-12-06 RX ADMIN — DESVENLAFAXINE SUCCINATE 50 MG: 50 TABLET, EXTENDED RELEASE ORAL at 07:54

## 2019-12-06 RX ADMIN — METRONIDAZOLE 500 MG: 500 TABLET ORAL at 21:07

## 2019-12-06 RX ADMIN — ONDANSETRON 4 MG: 2 INJECTION INTRAMUSCULAR; INTRAVENOUS at 15:51

## 2019-12-06 RX ADMIN — Medication 250 MG: at 07:54

## 2019-12-06 RX ADMIN — TAZOBACTAM SODIUM AND PIPERACILLIN SODIUM 3.38 G: 375; 3 INJECTION, SOLUTION INTRAVENOUS at 07:53

## 2019-12-06 RX ADMIN — Medication 250 MG: at 21:07

## 2019-12-06 RX ADMIN — SODIUM CHLORIDE 125 ML/HR: 9 INJECTION, SOLUTION INTRAVENOUS at 23:35

## 2019-12-06 RX ADMIN — ACETAMINOPHEN, ASPIRIN AND CAFFEINE 1 TABLET: 250; 250; 65 TABLET, FILM COATED ORAL at 08:00

## 2019-12-06 RX ADMIN — POTASSIUM CHLORIDE 40 MEQ: 750 CAPSULE, EXTENDED RELEASE ORAL at 09:31

## 2019-12-06 RX ADMIN — ACETAMINOPHEN, ASPIRIN AND CAFFEINE 1 TABLET: 250; 250; 65 TABLET, FILM COATED ORAL at 15:46

## 2019-12-06 RX ADMIN — POTASSIUM CHLORIDE 40 MEQ: 750 CAPSULE, EXTENDED RELEASE ORAL at 15:51

## 2019-12-06 RX ADMIN — PROMETHAZINE HYDROCHLORIDE 12.5 MG: 25 INJECTION INTRAMUSCULAR; INTRAVENOUS at 12:44

## 2019-12-06 RX ADMIN — ENOXAPARIN SODIUM 40 MG: 40 INJECTION SUBCUTANEOUS at 15:46

## 2019-12-06 RX ADMIN — PROMETHAZINE HYDROCHLORIDE 12.5 MG: 25 INJECTION INTRAMUSCULAR; INTRAVENOUS at 21:06

## 2019-12-06 RX ADMIN — MAGNESIUM SULFATE 2 G: 2 INJECTION INTRAVENOUS at 07:53

## 2019-12-06 RX ADMIN — PANTOPRAZOLE SODIUM 40 MG: 40 INJECTION, POWDER, FOR SOLUTION INTRAVENOUS at 05:00

## 2019-12-06 RX ADMIN — TAZOBACTAM SODIUM AND PIPERACILLIN SODIUM 3.38 G: 375; 3 INJECTION, SOLUTION INTRAVENOUS at 15:46

## 2019-12-06 NOTE — PROGRESS NOTES
[facility] Medicine Services  PROGRESS NOTE    Patient Name: Mishel Freeman  : 1975  MRN: [id number]    Date of Admission: 2019  Primary Care Physician: Anastacia Jose MD    Subjective   Subjective     CC:  Vomiting / abdominal Pain / diarrhea    HPI:  Seems to be improving.  Looks dehydrated.  Started having diarrhea overnight.  Has been a smoker since age 14 yrs old.  Reporting diarrhea starting around 11-12.  Pulmonary Nodule appears to have been previously worked up with a Bronch.  Diarrhea may be new.  No vomiting reported today.   Seen by General Surgery again today.      Review of Systems    Gen- No fevers, chills  CV- No chest pain, palpitations  Resp- No cough, dyspnea  GI-  + abdominal pain / vomiting / diarrhea      Objective   Objective     Vital Signs:   Temp:  [98.2 °F (36.8 °C)-100.4 °F (38 °C)] 98.8 °F (37.1 °C)  Resp:  [16-20] 16  BP: (102-121)/(67-78) 102/67        Physical Exam:    Constitutional: No acute distress, awake, alert  HENT: NCAT, dry tongue  Respiratory: Clear to auscultation bilaterally, respiratory effort normal   Cardiovascular: RRR, s1 and s2  Gastrointestinal: hyperactive bowel sounds, + epigastric tenderness on deep palpation, no guarding, no rebound, no significant pain on superficial palpation  Musculoskeletal: No bilateral ankle edema  Psychiatric: Appropriate affect, cooperative  Neurologic: Oriented x 3, strength symmetric in all extremities, Cranial Nerves grossly intact to confrontation, speech clear  Skin: dry, + skin tenting    Results Reviewed:    Results from last 7 days   Lab Units 19  0519  0912   WBC 10*3/mm3 5.91 14.20*   HEMOGLOBIN g/dL 10.0* 14.6   HEMATOCRIT % 32.2* 43.9   PLATELETS 10*3/mm3 245 376   PROCALCITONIN ng/mL  --  0.20     Results from last 7 days   Lab Units 19  0520 19  0912   SODIUM mmol/L 136 139   POTASSIUM mmol/L 3.1* 3.9   CHLORIDE mmol/L 106 99   CO2 mmol/L 19.0*  20.0*   BUN mg/dL 8 14   CREATININE mg/dL 0.57 0.66   GLUCOSE mg/dL 99 121*   CALCIUM mg/dL 7.7* 9.7   ALT (SGPT) U/L 13 19   AST (SGOT) U/L 12 15   TROPONIN T ng/mL  --  <0.010     Estimated Creatinine Clearance: 121.4 mL/min (by C-G formula based on SCr of 0.57 mg/dL).    Microbiology Results Abnormal     Procedure Component Value - Date/Time    Blood Culture - Blood, Arm, Right [[id number]] Collected:  12/05/19 0929    Lab Status:  Preliminary result Specimen:  Blood from Arm, Right Updated:  12/06/19 1018     Blood Culture No growth at 24 hours    Blood Culture - Blood, Arm, Left [[id number]] Collected:  12/05/19 0925    Lab Status:  Preliminary result Specimen:  Blood from Arm, Left Updated:  12/06/19 1018     Blood Culture No growth at 24 hours          Imaging Results (Last 24 Hours)     Procedure Component Value Units Date/Time    CT Abdomen Pelvis With Contrast [[id number]] Collected:  12/05/19 1129     Updated:  12/05/19 1649    Narrative:       EXAMINATION: CT ABDOMEN PELVIS W CONTRAST- 12/05/2019     INDICATION: Abdominal pain, nausea, vomiting     TECHNIQUE: Contrast enhanced CT imaging of the abdomen and pelvis was  performed with additional coronal and sagittal reformatted imaging  provided for review. Additional delayed imaging of the abdomen and  pelvis in the axial plane was also provided for review.     The radiation dose reduction device was turned on for each scan per the  ALARA (As Low as Reasonably Achievable) protocol.     COMPARISON: CT of the abdomen and pelvis 09/25/2019.     FINDINGS: Visualized lower lungs do not demonstrate acute abnormality.  Scattered punctate granulomas are noted. Identified within the left  lower lobe is a 1.8 cm low-density nodule/lesion. This was previously  seen/evaluated with PET/CT exam performed 06/14/2019.     The liver is unremarkable in appearance. Portal vein appears patent.  Postcholecystectomy changes are identified with mild prominence of the  common bile  duct, similar to 09/25/2019 consistent with a  postcholecystectomy state. The spleen is unremarkable. The adrenal  glands and pancreas do not demonstrate acute abnormality. The kidneys  enhance symmetrically without evidence of mass or obstruction. No  hydronephrosis. Urinary bladder is partially decompressed and otherwise  unremarkable. No pelvic mass identified. No free fluid noted in the  pelvis. The retroperitoneum continues to demonstrate calcified and  noncalcified atherosclerotic plaquing of the abdominal aorta and its  branches, similar to the prior exam. No free air is identified. The  stomach demonstrates a small air-fluid level and is otherwise  unremarkable. The small bowel of the upper abdomen demonstrates a  slightly abnormal configuration and appears bunched within the midline  where there is some abnormal swirling of vessels. This appears  distinctly different when compared to 09/25/2019 however there is no  convincing evidence of bowel obstruction identified at this time and  this finding may be incidental although internal hernia is difficult to  entirely exclude. The remainder of the more distal small bowel is  unrevealing. No evidence of bowel obstruction. The appendix is normal.  Similar cystic changes to the bilateral ovaries with a right ovarian  calcification unchanged from prior exam. The remainder of the colon does  not demonstrate abnormality and is stool filled. Small umbilical hernia  is identified. Dense breast tissue of the lower breast is noted.  Thoracolumbar degenerative changes are identified. The bony pelvis  appears intact. Delayed imaging of abdomen and pelvis demonstrates  contrast within the ureters and urinary bladder without evidence of  obstruction.       Impression:          1. Abnormal configuration of the upper abdominal small bowel with  swirling of internal abdominal mesenteric vessels, new from 09/25/2019  which is of uncertain clinical significance, although an  internal hernia  may be present. Given the mild small bowel wall edema identified within  this region gastroenteritis is possible, however recommend correlation  with lactic acidosis. There is no evidence for bowel obstruction  identified at this time. Correlate clinically.     2. Nonspecific cystic changes to the bilateral ovaries, similar to  09/25/2019. The need for follow-up pelvic ultrasound should be  determined clinically.     3. Redemonstration of a left lower lobe pulmonary nodule as previously  described/evaluated on prior imaging as discussed above.     D:  12/05/2019  E:  12/05/2019     This report was finalized on 12/5/2019 4:46 PM by Dr. Elder Tavares MD.                  I have reviewed the medications:  Scheduled Meds:  desvenlafaxine 50 mg Oral Daily   enoxaparin 40 mg Subcutaneous Q24H   metroNIDAZOLE 500 mg Oral Q8H   nicotine 1 patch Transdermal Q24H   pantoprazole 40 mg Intravenous Q AM   saccharomyces boulardii 250 mg Oral BID   sodium chloride 10 mL Intravenous Q12H     Continuous Infusions:  sodium chloride 125 mL/hr Last Rate: 125 mL/hr (12/06/19 1552)     PRN Meds:.•  aspirin-acetaminophen-caffeine  •  magnesium sulfate **OR** magnesium sulfate in D5W 1g/100mL (PREMIX) **OR** magnesium sulfate  •  melatonin  •  ondansetron  •  potassium chloride  •  potassium chloride  •  promethazine  •  sodium chloride  •  [COMPLETED] Insert peripheral IV **AND** sodium chloride  •  sodium chloride      Assessment/Plan   Assessment / Plan     Active Hospital Problems    Diagnosis  POA   • **Lactic acidosis [E87.2]  Yes   • Abdominal pain [R10.9]  Yes   • N&V (nausea and vomiting) [R11.2]  Unknown   • HTN (hypertension) [I10]  Unknown   • SIRS (systemic inflammatory response syndrome) (CMS/HCC) [R65.10]  Unknown   • Leukocytosis [D72.829]  Unknown   • Nodule of lower lobe of left lung, 2.2 cm noncalcified [R91.1]  Yes   • Current smoker [F17.200]  Yes   • Smoker [F17.200]  Yes      Resolved Hospital  Problems   No resolved problems to display.     Brief Hospital Course to date:  Mishel Freeman is a 44 y.o. female who presented with vomiting, abdominal pain and lactic acidosis.    Abdominal Pain  - improving  - seen by General Surgery  - now having diarrhea  - on probiotic  Dehydration  - low blood pressure  - IV fluids  Diarrhea  - unclear  - change IV abx to PO  - consider stopping tomorrow  Chronic Anxiety  Depression  - on Pristiq (SNRI)  Hypertension  - on lisinopril  Tobacco Use Disorder  - encourage smoking cessation  Pulmonary Nodule  - history of Left Lower Lobe Pulm Nodule  - Bronch in July  - PET in June 2019 suggests possible non calcified 2.2 cm granuloma  Hypokalemia  - supplement potassium    Advance diet    DVT Prophylaxis:  Lovenox 40 mg SC    Disposition: I expect the patient to be discharged home on Saturday if improved    CODE STATUS:   Code Status and Medical Interventions:   Ordered at: 12/05/19 1322     Code Status:    CPR     Medical Interventions (Level of Support Prior to Arrest):    Full         Electronically signed by Mann Yin MD, 12/06/19, 4:11 PM.

## 2019-12-06 NOTE — PROGRESS NOTES
Discharge Planning Assessment  [facility]     Patient Name: Mishel Freeman  MRN: [id number]  Today's Date: 12/6/2019    Admit Date: 12/5/2019    Discharge Needs Assessment     Row Name 12/06/19 0939       Living Environment    Lives With  spouse    Name(s) of Who Lives With Patient   David    Current Living Arrangements  home/apartment/condo    Primary Care Provided by  self    Provides Primary Care For  no one    Family Caregiver if Needed  spouse    Family Caregiver Names   David    Quality of Family Relationships  supportive;involved;helpful    Able to Return to Prior Arrangements  yes       Resource/Environmental Concerns    Resource/Environmental Concerns  none    Transportation Concerns  car, none       Transition Planning    Patient/Family Anticipates Transition to  home with family    Patient/Family Anticipated Services at Transition  none    Transportation Anticipated  family or friend will provide       Discharge Needs Assessment    Readmission Within the Last 30 Days  no previous admission in last 30 days    Concerns to be Addressed  denies needs/concerns at this time    Equipment Currently Used at Home  none    Anticipated Changes Related to Illness  none    Equipment Needed After Discharge  none    Provided post acute provider list?  Refused Patient established with her current PCP    Discharge Coordination/Progress  PCP Anastacia Hall [facility]        Discharge Plan     Row Name 12/06/19 0941       Plan    Plan  Home    Patient/Family in Agreement with Plan  yes    Plan Comments  Spoke with patient and her .  They live in [place].  Patient is independent, still works.  No current DME or home care services.  Denied dc needs at this time,  to transport.     Final Discharge Disposition Code  01 - home or self-care        Destination      No service coordination in this encounter.      Durable Medical Equipment      No service coordination in this  encounter.      Dialysis/Infusion      No service coordination in this encounter.      Home Medical Care      No service coordination in this encounter.      Therapy      No service coordination in this encounter.      Community Resources      No service coordination in this encounter.          Demographic Summary     Row Name 12/06/19 0943       General Information    Arrived From  emergency department    Referral Source  admission list    Reason for Consult  discharge planning    Preferred Language  English     Used During This Interaction  no        Functional Status     Row Name 12/06/19 0939       Functional Status    Usual Activity Tolerance  good       Functional Status, IADL    Medications  independent    Meal Preparation  independent    Housekeeping  independent    Laundry  independent    Shopping  independent       Employment/    Employment Status  employed full time    Employment/ Comments  [employer]        Psychosocial    No documentation.       Abuse/Neglect    No documentation.       Legal    No documentation.       Substance Abuse    No documentation.       Patient Forms    No documentation.           Moira Zamudio RN

## 2019-12-06 NOTE — PROGRESS NOTES
"Mishel Freeman  1975  [id number]    Surgery Progress Note    Date of visit: 12/6/2019    Subjective: Multiple episodes of diarrhea overnight  No vomiting  Nausea controlled with medications  mild abdominal pain to deep palpation      Objective:    /67 (BP Location: Right arm, Patient Position: Lying)   Pulse 106   Temp 98.8 °F (37.1 °C) (Oral)   Resp 16   Ht 157.5 cm (62\")   Wt 79.1 kg (174 lb 6.4 oz)   SpO2 98%   Breastfeeding? No   BMI 31.90 kg/m²     Intake/Output Summary (Last 24 hours) at 12/6/2019 1345  Last data filed at 12/5/2019 2358  Gross per 24 hour   Intake 1135.5 ml   Output 400 ml   Net 735.5 ml       CV: Regular rate and rhythm  L: normal air entry    Abd: Nondistended and soft with mild upper abdominal tenderness to deep palpation  No diffuse guarding or peritoneal signs      LABS:    Results from last 7 days   Lab Units 12/06/19  0520   WBC 10*3/mm3 5.91   HEMOGLOBIN g/dL 10.0*   HEMATOCRIT % 32.2*   PLATELETS 10*3/mm3 245     Results from last 7 days   Lab Units 12/06/19  0520   SODIUM mmol/L 136   POTASSIUM mmol/L 3.1*   CHLORIDE mmol/L 106   CO2 mmol/L 19.0*   BUN mg/dL 8   CREATININE mg/dL 0.57   CALCIUM mg/dL 7.7*   BILIRUBIN mg/dL 0.3   ALK PHOS U/L 30*   ALT (SGPT) U/L 13   AST (SGOT) U/L 12   GLUCOSE mg/dL 99     Results from last 7 days   Lab Units 12/06/19  0520   SODIUM mmol/L 136   POTASSIUM mmol/L 3.1*   CHLORIDE mmol/L 106   CO2 mmol/L 19.0*   BUN mg/dL 8   CREATININE mg/dL 0.57   GLUCOSE mg/dL 99   CALCIUM mg/dL 7.7*     Lab Results   Lab Value Date/Time    LIPASE 16 12/05/2019 0912    LIPASE 18 09/25/2019 0727         Assessment/ Plan: Clinically stable  Patient admitted what appears to be viral gastroenteritis  She has responded to IV hydration with normal lactic acid  Continue with hydration and  advance diet slowly  Patient does not have a surgical abdomen  We will follow as needed    Problem List Items Addressed This Visit     None      Visit " Diagnoses     Internal hernia    -  Primary    Intractable vomiting with nausea, unspecified vomiting type        Sepsis, due to unspecified organism, unspecified whether acute organ dysfunction present (CMS/[facility])                Andrew Loya MD  12/6/2019  1:45 PM

## 2019-12-07 ENCOUNTER — APPOINTMENT (OUTPATIENT)
Dept: CT IMAGING | Facility: HOSPITAL | Age: 44
End: 2019-12-07

## 2019-12-07 LAB
AMYLASE SERPL-CCNC: 55 U/L (ref 28–100)
ANION GAP SERPL CALCULATED.3IONS-SCNC: 12 MMOL/L (ref 5–15)
BUN BLD-MCNC: 4 MG/DL (ref 6–20)
BUN/CREAT SERPL: 8.2 (ref 7–25)
C DIFF TOX GENS STL QL NAA+PROBE: DETECTED
CALCIUM SPEC-SCNC: 7.7 MG/DL (ref 8.6–10.5)
CHLORIDE SERPL-SCNC: 112 MMOL/L (ref 98–107)
CO2 SERPL-SCNC: 17 MMOL/L (ref 22–29)
CREAT BLD-MCNC: 0.49 MG/DL (ref 0.57–1)
D-LACTATE SERPL-SCNC: 0.7 MMOL/L (ref 0.5–2)
D-LACTATE SERPL-SCNC: 1 MMOL/L (ref 0.5–2)
DEPRECATED RDW RBC AUTO: 47.4 FL (ref 37–54)
ERYTHROCYTE [DISTWIDTH] IN BLOOD BY AUTOMATED COUNT: 12.8 % (ref 12.3–15.4)
GFR SERPL CREATININE-BSD FRML MDRD: 137 ML/MIN/1.73
GLUCOSE BLD-MCNC: 82 MG/DL (ref 65–99)
HCT VFR BLD AUTO: 28.3 % (ref 34–46.6)
HCT VFR BLD AUTO: 30.1 % (ref 34–46.6)
HEMOCCULT STL QL: NEGATIVE
HGB BLD-MCNC: 9.2 G/DL (ref 12–15.9)
HGB BLD-MCNC: 9.3 G/DL (ref 12–15.9)
LIPASE SERPL-CCNC: 28 U/L (ref 13–60)
MAGNESIUM SERPL-MCNC: 2.1 MG/DL (ref 1.6–2.6)
MCH RBC QN AUTO: 31 PG (ref 26.6–33)
MCHC RBC AUTO-ENTMCNC: 30.9 G/DL (ref 31.5–35.7)
MCV RBC AUTO: 100.3 FL (ref 79–97)
PHOSPHATE SERPL-MCNC: 2.1 MG/DL (ref 2.5–4.5)
PHOSPHATE SERPL-MCNC: 2.2 MG/DL (ref 2.5–4.5)
PLATELET # BLD AUTO: 261 10*3/MM3 (ref 140–450)
PMV BLD AUTO: 9.3 FL (ref 6–12)
POTASSIUM BLD-SCNC: 4.1 MMOL/L (ref 3.5–5.2)
PROCALCITONIN SERPL-MCNC: 0.03 NG/ML (ref 0.1–0.25)
RBC # BLD AUTO: 3 10*6/MM3 (ref 3.77–5.28)
SODIUM BLD-SCNC: 141 MMOL/L (ref 136–145)
TSH SERPL DL<=0.05 MIU/L-ACNC: 5.86 UIU/ML (ref 0.27–4.2)
WBC NRBC COR # BLD: 5.24 10*3/MM3 (ref 3.4–10.8)

## 2019-12-07 PROCEDURE — 85018 HEMOGLOBIN: CPT | Performed by: NURSE PRACTITIONER

## 2019-12-07 PROCEDURE — 82150 ASSAY OF AMYLASE: CPT | Performed by: NURSE PRACTITIONER

## 2019-12-07 PROCEDURE — 25010000002 ENOXAPARIN PER 10 MG: Performed by: INTERNAL MEDICINE

## 2019-12-07 PROCEDURE — G0378 HOSPITAL OBSERVATION PER HR: HCPCS

## 2019-12-07 PROCEDURE — 83690 ASSAY OF LIPASE: CPT | Performed by: NURSE PRACTITIONER

## 2019-12-07 PROCEDURE — 87493 C DIFF AMPLIFIED PROBE: CPT | Performed by: NURSE PRACTITIONER

## 2019-12-07 PROCEDURE — 25010000002 HYDROMORPHONE PER 4 MG: Performed by: NURSE PRACTITIONER

## 2019-12-07 PROCEDURE — 84145 PROCALCITONIN (PCT): CPT | Performed by: HOSPITALIST

## 2019-12-07 PROCEDURE — 83605 ASSAY OF LACTIC ACID: CPT | Performed by: NURSE PRACTITIONER

## 2019-12-07 PROCEDURE — 84100 ASSAY OF PHOSPHORUS: CPT | Performed by: HOSPITALIST

## 2019-12-07 PROCEDURE — 25010000002 PROMETHAZINE PER 50 MG: Performed by: NURSE PRACTITIONER

## 2019-12-07 PROCEDURE — 85027 COMPLETE CBC AUTOMATED: CPT | Performed by: HOSPITALIST

## 2019-12-07 PROCEDURE — 83605 ASSAY OF LACTIC ACID: CPT | Performed by: HOSPITALIST

## 2019-12-07 PROCEDURE — 83735 ASSAY OF MAGNESIUM: CPT | Performed by: HOSPITALIST

## 2019-12-07 PROCEDURE — 82272 OCCULT BLD FECES 1-3 TESTS: CPT | Performed by: NURSE PRACTITIONER

## 2019-12-07 PROCEDURE — 84443 ASSAY THYROID STIM HORMONE: CPT | Performed by: HOSPITALIST

## 2019-12-07 PROCEDURE — 99233 SBSQ HOSP IP/OBS HIGH 50: CPT | Performed by: NURSE PRACTITIONER

## 2019-12-07 PROCEDURE — 25010000002 DIAZEPAM PER 5 MG: Performed by: NURSE PRACTITIONER

## 2019-12-07 PROCEDURE — 74178 CT ABD&PLV WO CNTR FLWD CNTR: CPT

## 2019-12-07 PROCEDURE — 25010000002 MORPHINE PER 10 MG: Performed by: HOSPITALIST

## 2019-12-07 PROCEDURE — 25010000002 IOPAMIDOL 61 % SOLUTION: Performed by: HOSPITALIST

## 2019-12-07 PROCEDURE — 85014 HEMATOCRIT: CPT | Performed by: NURSE PRACTITIONER

## 2019-12-07 PROCEDURE — 80048 BASIC METABOLIC PNL TOTAL CA: CPT | Performed by: HOSPITALIST

## 2019-12-07 RX ORDER — HYDROMORPHONE HYDROCHLORIDE 1 MG/ML
0.5 INJECTION, SOLUTION INTRAMUSCULAR; INTRAVENOUS; SUBCUTANEOUS
Status: DISCONTINUED | OUTPATIENT
Start: 2019-12-07 | End: 2019-12-10 | Stop reason: HOSPADM

## 2019-12-07 RX ORDER — FAMOTIDINE 10 MG/ML
20 INJECTION, SOLUTION INTRAVENOUS EVERY 12 HOURS SCHEDULED
Status: DISCONTINUED | OUTPATIENT
Start: 2019-12-07 | End: 2019-12-09

## 2019-12-07 RX ORDER — DIAZEPAM 5 MG/ML
2.5 INJECTION, SOLUTION INTRAMUSCULAR; INTRAVENOUS ONCE
Status: COMPLETED | OUTPATIENT
Start: 2019-12-07 | End: 2019-12-07

## 2019-12-07 RX ORDER — HYDROMORPHONE HYDROCHLORIDE 1 MG/ML
0.5 INJECTION, SOLUTION INTRAMUSCULAR; INTRAVENOUS; SUBCUTANEOUS ONCE
Status: COMPLETED | OUTPATIENT
Start: 2019-12-07 | End: 2019-12-07

## 2019-12-07 RX ORDER — TRAMADOL HYDROCHLORIDE 50 MG/1
25 TABLET ORAL ONCE
Status: COMPLETED | OUTPATIENT
Start: 2019-12-07 | End: 2019-12-07

## 2019-12-07 RX ORDER — MORPHINE SULFATE 2 MG/ML
1 INJECTION, SOLUTION INTRAMUSCULAR; INTRAVENOUS
Status: DISCONTINUED | OUTPATIENT
Start: 2019-12-07 | End: 2019-12-07

## 2019-12-07 RX ORDER — TIZANIDINE 4 MG/1
4 TABLET ORAL EVERY 8 HOURS SCHEDULED
Status: DISCONTINUED | OUTPATIENT
Start: 2019-12-07 | End: 2019-12-10 | Stop reason: HOSPADM

## 2019-12-07 RX ADMIN — HYDROMORPHONE HYDROCHLORIDE 0.5 MG: 1 INJECTION, SOLUTION INTRAMUSCULAR; INTRAVENOUS; SUBCUTANEOUS at 19:31

## 2019-12-07 RX ADMIN — TRAMADOL HYDROCHLORIDE 25 MG: 50 TABLET, FILM COATED ORAL at 07:35

## 2019-12-07 RX ADMIN — PROMETHAZINE HYDROCHLORIDE 12.5 MG: 25 INJECTION INTRAMUSCULAR; INTRAVENOUS at 15:45

## 2019-12-07 RX ADMIN — DESVENLAFAXINE SUCCINATE 50 MG: 50 TABLET, EXTENDED RELEASE ORAL at 08:05

## 2019-12-07 RX ADMIN — ENOXAPARIN SODIUM 40 MG: 40 INJECTION SUBCUTANEOUS at 15:42

## 2019-12-07 RX ADMIN — DIAZEPAM 2.5 MG: 5 INJECTION, SOLUTION INTRAMUSCULAR; INTRAVENOUS at 13:11

## 2019-12-07 RX ADMIN — POTASSIUM & SODIUM PHOSPHATES POWDER PACK 280-160-250 MG 2 PACKET: 280-160-250 PACK at 10:27

## 2019-12-07 RX ADMIN — TIZANIDINE 4 MG: 4 TABLET ORAL at 21:02

## 2019-12-07 RX ADMIN — IOPAMIDOL 85 ML: 612 INJECTION, SOLUTION INTRAVENOUS at 20:00

## 2019-12-07 RX ADMIN — SODIUM CHLORIDE, PRESERVATIVE FREE 10 ML: 5 INJECTION INTRAVENOUS at 08:05

## 2019-12-07 RX ADMIN — METRONIDAZOLE 500 MG: 500 INJECTION, SOLUTION INTRAVENOUS at 13:37

## 2019-12-07 RX ADMIN — SODIUM CHLORIDE 125 ML/HR: 9 INJECTION, SOLUTION INTRAVENOUS at 15:43

## 2019-12-07 RX ADMIN — METRONIDAZOLE 500 MG: 500 INJECTION, SOLUTION INTRAVENOUS at 20:57

## 2019-12-07 RX ADMIN — PANTOPRAZOLE SODIUM 40 MG: 40 INJECTION, POWDER, FOR SOLUTION INTRAVENOUS at 05:39

## 2019-12-07 RX ADMIN — METRONIDAZOLE 500 MG: 500 TABLET ORAL at 05:39

## 2019-12-07 RX ADMIN — MORPHINE SULFATE 1 MG: 2 INJECTION, SOLUTION INTRAMUSCULAR; INTRAVENOUS at 09:18

## 2019-12-07 RX ADMIN — VANCOMYCIN HYDROCHLORIDE 125 MG: KIT at 20:59

## 2019-12-07 RX ADMIN — MORPHINE SULFATE 1 MG: 2 INJECTION, SOLUTION INTRAMUSCULAR; INTRAVENOUS at 08:01

## 2019-12-07 RX ADMIN — POTASSIUM & SODIUM PHOSPHATES POWDER PACK 280-160-250 MG 2 PACKET: 280-160-250 PACK at 20:57

## 2019-12-07 RX ADMIN — Medication 250 MG: at 20:57

## 2019-12-07 RX ADMIN — MORPHINE SULFATE 1 MG: 2 INJECTION, SOLUTION INTRAMUSCULAR; INTRAVENOUS at 10:25

## 2019-12-07 RX ADMIN — FAMOTIDINE 20 MG: 10 INJECTION, SOLUTION INTRAVENOUS at 21:55

## 2019-12-07 RX ADMIN — HYDROMORPHONE HYDROCHLORIDE 0.5 MG: 1 INJECTION, SOLUTION INTRAMUSCULAR; INTRAVENOUS; SUBCUTANEOUS at 12:34

## 2019-12-07 RX ADMIN — SODIUM CHLORIDE 125 ML/HR: 9 INJECTION, SOLUTION INTRAVENOUS at 07:22

## 2019-12-07 RX ADMIN — Medication 250 MG: at 08:05

## 2019-12-07 NOTE — PROGRESS NOTES
"    [facility] Medicine Services  PROGRESS NOTE    Patient Name: Mishel Freeman  : 1975  MRN: [id number]    Date of Admission: 2019  Primary Care Physician: Anastacia Jose MD    Subjective   Subjective     CC:  Vomiting / abdominal Pain / diarrhea    HPI:    Seen resting up in bed in moderate appearing discomfort.  No visitors at bs. Pt states she has had worsening epigastric region abd pain today.  Rates at 10/10 abd states IV pain meds \"aren't helping at all\".  Actually declined another dose to nurse.  No appetite. Intermittent nausea.  No vomiting.  No melena, hematachezia or hemataemesis.  Still having diarrhea, green looking by report.      Review of Systems    Gen- No fevers, chills  CV- No chest pain, palpitations  Resp- No cough, dyspnea  GI-  + abdominal pain / intermittent nausea / diarrhea      Objective   Objective     Vital Signs:   Temp:  [98 °F (36.7 °C)] 98 °F (36.7 °C)  Heart Rate:  [68-74] 68  Resp:  [18] 18  BP: (118-122)/(82-86) 122/86        Physical Exam:    Constitutional: No acute distress, awake, appears uncomfortable sitting up in bed.  No visitors at bs.   HENT: NCAT, dry tongue  Respiratory: Clear to auscultation bilaterally, respiratory effort normal on RA  Cardiovascular: RRR, s1 and s2  Gastrointestinal: hyperactive bowel sounds, + epigastric tenderness on light palpation, no guarding, no rebound. No radiation of pain on palp. No current evidence of acute abd.   Musculoskeletal: No bilateral ankle edema. FIORE spont   Psychiatric: Appropriate affect, cooperative but a little anxious (due to pain)  Neurologic: Oriented x 3, strength symmetric in all extremities, Cranial Nerves grossly intact to confrontation, speech clear and appropriate.  Follows commands   Skin: dry, + skin tenting    Results Reviewed:    Results from last 7 days   Lab Units 19  1250 19  0543 19  0520 19  0912   WBC 10*3/mm3  --  5.24 5.91 14.20* "   HEMOGLOBIN g/dL 9.2* 9.3* 10.0* 14.6   HEMATOCRIT % 28.3* 30.1* 32.2* 43.9   PLATELETS 10*3/mm3  --  261 245 376   PROCALCITONIN ng/mL  --  0.03*  --  0.20     Results from last 7 days   Lab Units 12/07/19  0543 12/06/19  0520 12/05/19  0912   SODIUM mmol/L 141 136 139   POTASSIUM mmol/L 4.1 3.1* 3.9   CHLORIDE mmol/L 112* 106 99   CO2 mmol/L 17.0* 19.0* 20.0*   BUN mg/dL 4* 8 14   CREATININE mg/dL 0.49* 0.57 0.66   GLUCOSE mg/dL 82 99 121*   CALCIUM mg/dL 7.7* 7.7* 9.7   ALT (SGPT) U/L  --  13 19   AST (SGOT) U/L  --  12 15   TROPONIN T ng/mL  --   --  <0.010     Estimated Creatinine Clearance: 141.2 mL/min (A) (by C-G formula based on SCr of 0.49 mg/dL (L)).    Microbiology Results Abnormal     Procedure Component Value - Date/Time    Blood Culture - Blood, Arm, Right [[id number]] Collected:  12/05/19 0929    Lab Status:  Preliminary result Specimen:  Blood from Arm, Right Updated:  12/07/19 1015     Blood Culture No growth at 2 days    Blood Culture - Blood, Arm, Left [[id number]] Collected:  12/05/19 0925    Lab Status:  Preliminary result Specimen:  Blood from Arm, Left Updated:  12/07/19 1015     Blood Culture No growth at 2 days          Imaging Results (Last 24 Hours)     ** No results found for the last 24 hours. **               I have reviewed the medications:  Scheduled Meds:    desvenlafaxine 50 mg Oral Daily   enoxaparin 40 mg Subcutaneous Q24H   metroNIDAZOLE 500 mg Intravenous Q8H   nicotine 1 patch Transdermal Q24H   pantoprazole 40 mg Intravenous Q AM   saccharomyces boulardii 250 mg Oral BID   sodium chloride 10 mL Intravenous Q12H     Continuous Infusions:    sodium chloride 125 mL/hr Last Rate: 125 mL/hr (12/07/19 1200)     PRN Meds:.•  aspirin-acetaminophen-caffeine  •  magnesium sulfate **OR** magnesium sulfate in D5W 1g/100mL (PREMIX) **OR** magnesium sulfate  •  melatonin  •  ondansetron  •  potassium & sodium phosphates **OR** potassium & sodium phosphates  •  potassium chloride  •   potassium chloride  •  promethazine  •  sodium chloride  •  [COMPLETED] Insert peripheral IV **AND** sodium chloride  •  sodium chloride      Assessment/Plan   Assessment / Plan     Active Hospital Problems    Diagnosis  POA   • **Lactic acidosis [E87.2]  Yes   • Abdominal pain [R10.9]  Yes   • N&V (nausea and vomiting) [R11.2]  Unknown   • HTN (hypertension) [I10]  Unknown   • SIRS (systemic inflammatory response syndrome) (CMS/HCC) [R65.10]  Unknown   • Leukocytosis [D72.829]  Unknown   • Nodule of lower lobe of left lung, 2.2 cm noncalcified [R91.1]  Yes   • Current smoker [F17.200]  Yes   • Smoker [F17.200]  Yes      Resolved Hospital Problems   No resolved problems to display.     Brief Hospital Course to date:  Mishel Freeman is a 44 y.o. female who presented with vomiting, abdominal pain and lactic acidosis.    Assessment/plan:    This is my first day taking care of this pt.     Abdominal Pain  Epigastric region  --much worse today.  Constant reported at 10/10 scale all to epigastric region.  - seen by General Surgery prior and deemed no sx issues  --CT A/P on admit with Abnormal configuration of the upper abdominal small bowel with swirling of internal abdominal mesenteric vessels, new from 09/25/2019  which is of uncertain clinical significance, although an internal hernia may be present.  - still having diarrhea  - on probiotic  --pain source unclear, poss muscular from prior n/v episodes.  However worsening now with NO vomiting issues.    --due to worsening abd pain will put back on NPO with ice chips.  Ck stat labs, lipase, amylase, lactic acid, and continue IVFs.  --ck stat CT A/P with and without to re-eval due to abn CT mesenteric vessels on last CT.    --reconsult sx IF indicated.   --will try onetime dose of valium and dilaudid    Dehydration  - low blood pressure, stable  - continue IV fluids    Diarrhea  - unclear but continues  - change po abx back to IV for now  - will ck stool studies  and guiac and stool for c-diff    Chronic Anxiety  Depression  - on Pristiq (SNRI)    Hypertension  - on lisinopril    Tobacco Use Disorder  - encourage smoking cessation    Pulmonary Nodule  - history of Left Lower Lobe Pulm Nodule  - Bronch in July  - PET in June 2019 suggests possible non calcified 2.2 cm granuloma    Hypokalemia  - supplement potassium        DVT Prophylaxis:  Lovenox 40 mg SC    Disposition: I expect the patient to be discharged home TBD pending medically improved.      CODE STATUS:   Code Status and Medical Interventions:   Ordered at: 12/05/19 1322     Code Status:    CPR     Medical Interventions (Level of Support Prior to Arrest):    Full         Electronically signed by PATY Bone, 12/07/19, 2:28 PM.

## 2019-12-07 NOTE — PLAN OF CARE
VSS. Pt has had severe abdominal pain throughout the day unrelieved with PRn meds. Continues to have multiple episodes of diarrhea. Cultures of stool pending. Ct of abdomen pending. MD aware of pt condition. Will continue to monitor closely.

## 2019-12-08 LAB
ANION GAP SERPL CALCULATED.3IONS-SCNC: 12 MMOL/L (ref 5–15)
BUN BLD-MCNC: 3 MG/DL (ref 6–20)
BUN/CREAT SERPL: 6 (ref 7–25)
CALCIUM SPEC-SCNC: 8.3 MG/DL (ref 8.6–10.5)
CHLORIDE SERPL-SCNC: 106 MMOL/L (ref 98–107)
CO2 SERPL-SCNC: 17 MMOL/L (ref 22–29)
CREAT BLD-MCNC: 0.5 MG/DL (ref 0.57–1)
GFR SERPL CREATININE-BSD FRML MDRD: 133 ML/MIN/1.73
GLUCOSE BLD-MCNC: 103 MG/DL (ref 65–99)
MAGNESIUM SERPL-MCNC: 1.9 MG/DL (ref 1.6–2.6)
PHOSPHATE SERPL-MCNC: 3.2 MG/DL (ref 2.5–4.5)
POTASSIUM BLD-SCNC: 3.6 MMOL/L (ref 3.5–5.2)
SODIUM BLD-SCNC: 135 MMOL/L (ref 136–145)

## 2019-12-08 PROCEDURE — 25010000002 ONDANSETRON PER 1 MG: Performed by: INTERNAL MEDICINE

## 2019-12-08 PROCEDURE — 87427 SHIGA-LIKE TOXIN AG IA: CPT | Performed by: NURSE PRACTITIONER

## 2019-12-08 PROCEDURE — 84100 ASSAY OF PHOSPHORUS: CPT | Performed by: HOSPITALIST

## 2019-12-08 PROCEDURE — 87045 FECES CULTURE AEROBIC BACT: CPT | Performed by: NURSE PRACTITIONER

## 2019-12-08 PROCEDURE — 80048 BASIC METABOLIC PNL TOTAL CA: CPT | Performed by: HOSPITALIST

## 2019-12-08 PROCEDURE — 25010000002 HYDROMORPHONE PER 4 MG: Performed by: NURSE PRACTITIONER

## 2019-12-08 PROCEDURE — 25010000002 PROMETHAZINE PER 50 MG: Performed by: NURSE PRACTITIONER

## 2019-12-08 PROCEDURE — G0378 HOSPITAL OBSERVATION PER HR: HCPCS

## 2019-12-08 PROCEDURE — 87046 STOOL CULTR AEROBIC BACT EA: CPT | Performed by: NURSE PRACTITIONER

## 2019-12-08 PROCEDURE — 83735 ASSAY OF MAGNESIUM: CPT | Performed by: HOSPITALIST

## 2019-12-08 PROCEDURE — 99232 SBSQ HOSP IP/OBS MODERATE 35: CPT | Performed by: NURSE PRACTITIONER

## 2019-12-08 PROCEDURE — 25010000002 ENOXAPARIN PER 10 MG: Performed by: INTERNAL MEDICINE

## 2019-12-08 RX ORDER — HYDROCODONE BITARTRATE AND ACETAMINOPHEN 5; 325 MG/1; MG/1
1 TABLET ORAL EVERY 6 HOURS PRN
Status: DISCONTINUED | OUTPATIENT
Start: 2019-12-08 | End: 2019-12-10 | Stop reason: HOSPADM

## 2019-12-08 RX ORDER — SODIUM CHLORIDE 9 MG/ML
75 INJECTION, SOLUTION INTRAVENOUS CONTINUOUS
Status: DISCONTINUED | OUTPATIENT
Start: 2019-12-08 | End: 2019-12-09

## 2019-12-08 RX ADMIN — TIZANIDINE 4 MG: 4 TABLET ORAL at 15:09

## 2019-12-08 RX ADMIN — SODIUM CHLORIDE 75 ML/HR: 9 INJECTION, SOLUTION INTRAVENOUS at 10:34

## 2019-12-08 RX ADMIN — ENOXAPARIN SODIUM 40 MG: 40 INJECTION SUBCUTANEOUS at 15:09

## 2019-12-08 RX ADMIN — FAMOTIDINE 20 MG: 10 INJECTION, SOLUTION INTRAVENOUS at 21:43

## 2019-12-08 RX ADMIN — METRONIDAZOLE 500 MG: 500 INJECTION, SOLUTION INTRAVENOUS at 05:31

## 2019-12-08 RX ADMIN — TIZANIDINE 4 MG: 4 TABLET ORAL at 21:42

## 2019-12-08 RX ADMIN — FAMOTIDINE 20 MG: 10 INJECTION, SOLUTION INTRAVENOUS at 11:36

## 2019-12-08 RX ADMIN — VANCOMYCIN HYDROCHLORIDE 125 MG: KIT at 00:58

## 2019-12-08 RX ADMIN — DESVENLAFAXINE SUCCINATE 50 MG: 50 TABLET, EXTENDED RELEASE ORAL at 10:37

## 2019-12-08 RX ADMIN — METRONIDAZOLE 500 MG: 500 INJECTION, SOLUTION INTRAVENOUS at 21:26

## 2019-12-08 RX ADMIN — PROMETHAZINE HYDROCHLORIDE 12.5 MG: 25 INJECTION INTRAMUSCULAR; INTRAVENOUS at 05:53

## 2019-12-08 RX ADMIN — SODIUM CHLORIDE, PRESERVATIVE FREE 10 ML: 5 INJECTION INTRAVENOUS at 21:26

## 2019-12-08 RX ADMIN — VANCOMYCIN HYDROCHLORIDE 125 MG: KIT at 13:27

## 2019-12-08 RX ADMIN — Medication 250 MG: at 10:38

## 2019-12-08 RX ADMIN — HYDROMORPHONE HYDROCHLORIDE 0.5 MG: 1 INJECTION, SOLUTION INTRAMUSCULAR; INTRAVENOUS; SUBCUTANEOUS at 05:34

## 2019-12-08 RX ADMIN — Medication 250 MG: at 21:26

## 2019-12-08 RX ADMIN — VANCOMYCIN HYDROCHLORIDE 125 MG: KIT at 05:31

## 2019-12-08 RX ADMIN — ACETAMINOPHEN, ASPIRIN AND CAFFEINE 1 TABLET: 250; 250; 65 TABLET, FILM COATED ORAL at 15:08

## 2019-12-08 RX ADMIN — SODIUM CHLORIDE 75 ML/HR: 9 INJECTION, SOLUTION INTRAVENOUS at 22:37

## 2019-12-08 RX ADMIN — ONDANSETRON 4 MG: 2 INJECTION INTRAMUSCULAR; INTRAVENOUS at 10:37

## 2019-12-08 RX ADMIN — HYDROCODONE BITARTRATE AND ACETAMINOPHEN 1 TABLET: 5; 325 TABLET ORAL at 22:37

## 2019-12-08 RX ADMIN — TIZANIDINE 4 MG: 4 TABLET ORAL at 05:31

## 2019-12-08 RX ADMIN — VANCOMYCIN HYDROCHLORIDE 250 MG: KIT at 21:31

## 2019-12-08 NOTE — CONSULTS
"INFECTIOUS DISEASE CONSULT/INITIAL HOSPITAL VISIT    Mishel Freeman  1975  [id number]    Date of Consult: 12/8/2019    Admission Date: 12/5/2019      Requesting Provider: PATY Ratliff    Evaluating Physician: PATY Tam     Chief complaint: Abdominal pain, nausea vomiting    Current antimicrobial therapy: Vancomycin oral and Flagyl IV    Reason for Consultation: C. difficile    History of present illness:    Ms. Mishel Freeman is a 44 y.o. female being evaluated for C. difficile infection.  She has a past medical history significant for current ongoing tobacco abuse with history of left lower lung field lung nodule status post ERIC biopsy which was negative and is currently being monitored on serial imaging by pulmonary, prior CCY and anxiety.  She presented to the ED on 12/5 for sudden onset nausea and vomiting and subsequent epigastric pain which started that morning as well.  She endorses vomiting of nonbloody emesis that was \"too many times to count\".  Denies diarrhea at that time.  She does however endorse that she had 4 loose bowel movements the day prior to admission which is not uncommon for the patient.  She also states that she has alternating diarrhea and constipation for which she has had previous upper and lower endoscopies (the last being approximately 2 years ago [facility] which was reportedly \"normal\" per patient).  Initial work-up showed that she had a low-grade temp of 100.2 and white blood cell count of 14,000.  Lactate was 5.  Initial CT showed abnormal appearing upper abdominal small bowels with possible swirling of mesenteric vessels.  Per hospitalist note CT was reviewed with Dr. GRANADO with surgery and radiologist who felt that the CT was unchanged from prior without significant abnormalities.  She was subsequently admitted for further evaluation and treated and started on vancomycin IV x1 dose and Zosyn IV that was discontinued on 12/6.  During her stay " she has had worsening diarrhea and was therefore tested for C. difficile yesterday which has become positive.  Infectious disease has been consulted as result of this positive C. difficile testing.  She has a documented allergy to Macrobid with anaphylaxis as a result and she is currently receiving vancomycin oral and Flagyl IV.      Past Medical History:   Diagnosis Date   • Anxiety    • Depression    • Diverticulitis    • Eczema    • GERD (gastroesophageal reflux disease)    • Headache    • Hypertension    • Lung nodule    • Tobacco abuse        Past Surgical History:   Procedure Laterality Date   • BRONCHOSCOPY N/A 7/17/2019    Procedure: DARIN, RADIAL EBUS WITH FLUORO BRONCH;  Surgeon: Nikhil Knutson MD;  Location: [facility] ENDOSCOPY;  Service: Pulmonary   • CHOLECYSTECTOMY  1991   • COLONOSCOPY  2016, 2017   • ENDOMETRIAL ABLATION W/ NOVASURE  2010    WITH LAP BTL   • VAGINAL HYSTERECTOMY  2010    Partial        Family History   Problem Relation Age of Onset   • Breast cancer Paternal Aunt         40's   • Cancer Mother    • Diabetes Father    • Diabetes Brother    • Cancer Maternal Aunt    • Ovarian cancer Neg Hx        Social History     Socioeconomic History   • Marital status:      Spouse name: Not on file   • Number of children: 1   • Years of education: Not on file   • Highest education level: Not on file   Occupational History   • Occupation: [employer]   Tobacco Use   • Smoking status: Current Every Day Smoker     Packs/day: 0.25     Years: 30.00     Pack years: 7.50     Types: Cigarettes   • Smokeless tobacco: Never Used   • Tobacco comment: 5 a day    Substance and Sexual Activity   • Alcohol use: Yes     Alcohol/week: 1.0 standard drinks     Types: 1 Cans of beer per week     Comment: socially   • Drug use: No   • Sexual activity: Defer   Social History Narrative    Lives in [place].        Allergies   Allergen Reactions   • Macrobid [Nitrofurantoin Macrocrystal] Hives and  Shortness Of Breath         Medication:    Current Facility-Administered Medications:   •  aspirin-acetaminophen-caffeine (EXCEDRIN MIGRAINE) per tablet 1 tablet, 1 tablet, Oral, Q6H PRN, James Márquez MD, 1 tablet at 12/06/19 1546  •  desvenlafaxine (PRISTIQ) 24 hr tablet 50 mg, 50 mg, Oral, Daily, James Márquez MD, 50 mg at 12/07/19 0805  •  enoxaparin (LOVENOX) syringe 40 mg, 40 mg, Subcutaneous, Q24H, James Márquez MD, 40 mg at 12/07/19 1542  •  famotidine (PEPCID) injection 20 mg, 20 mg, Intravenous, Q12H, Amy Alvarez APRN, 20 mg at 12/07/19 2155  •  HYDROcodone-acetaminophen (NORCO) 5-325 MG per tablet 1 tablet, 1 tablet, Oral, Q6H PRN, Mann Yin MD  •  HYDROmorphone (DILAUDID) injection 0.5 mg, 0.5 mg, Intravenous, Q3H PRN, Amy Alvarez APRN, 0.5 mg at 12/08/19 0534  •  magnesium sulfate 4 gram infusion - Mg less than or equal to 1mg/dL, 4 g, Intravenous, PRN **OR** magnesium sulfate 3 gram infusion (1gm x 3) - Mg 1.1 - 1.5 mg/dL, 1 g, Intravenous, PRN **OR** Magnesium Sulfate 2 gram infusion- Mg 1.6 - 1.9 mg/dL, 2 g, Intravenous, PRN, James Márquez MD, Stopped at 12/06/19 0953  •  melatonin tablet 5 mg, 5 mg, Oral, Nightly PRN, Radha Garcia, DO  •  metroNIDAZOLE (FLAGYL) 500 mg/100mL IVPB, 500 mg, Intravenous, Q8H, Amy Alvarez APRN, Stopped at 12/08/19 0823  •  nicotine (NICODERM CQ) 21 MG/24HR patch 1 patch, 1 patch, Transdermal, Q24H, James Márquez MD  •  ondansetron (ZOFRAN) injection 4 mg, 4 mg, Intravenous, Q6H PRN, James Márquez MD, 4 mg at 12/06/19 1551  •  Pharmacy Consult, , Does not apply, Continuous PRN, Marivel Ryan, APRN  •  potassium & sodium phosphates (PHOS-NAK) 280-160-250 MG packet - for Phosphorus less than 1.25 mg/dL, 2 packet, Oral, Q6H PRN, 2 packet at 12/07/19 2057 **OR** potassium & sodium phosphates (PHOS-NAK) 280-160-250 MG packet - for Phosphorus 1.25 - 2.5 mg/dL, 2 packet, Oral, Q6H  PRN, Mann Yin MD, 2 packet at 12/07/19 1027  •  potassium chloride (KLOR-CON) packet 40 mEq, 40 mEq, Oral, PRN, Mann Yin MD  •  potassium chloride (MICRO-K) CR capsule 40 mEq, 40 mEq, Oral, PRN, Mann Yin MD, 40 mEq at 12/06/19 1551  •  promethazine (PHENERGAN) injection 12.5 mg, 12.5 mg, Intravenous, Q6H PRN, Janice, Sonia, APRN, 12.5 mg at 12/08/19 0553  •  saccharomyces boulardii (FLORASTOR) capsule 250 mg, 250 mg, Oral, BID, Janice, Sonia, APRN, 250 mg at 12/07/19 2057  •  sodium chloride 0.9 % flush 10 mL, 10 mL, Intravenous, PRN, Higinio Vazquez MD  •  [COMPLETED] Insert peripheral IV, , , Once **AND** sodium chloride 0.9 % flush 10 mL, 10 mL, Intravenous, PRN, Higinio Vazquez MD  •  sodium chloride 0.9 % flush 10 mL, 10 mL, Intravenous, Q12H, James Márquez MD, 10 mL at 12/07/19 0805  •  sodium chloride 0.9 % flush 10 mL, 10 mL, Intravenous, PRN, James Márquez MD  •  sodium chloride 0.9 % infusion, 75 mL/hr, Intravenous, Continuous, Kalie Davis APRN  •  tiZANidine (ZANAFLEX) tablet 4 mg, 4 mg, Oral, Q8H, Mann Yin MD, 4 mg at 12/08/19 0531  •  vancomycin oral solution reconstituted 125 mg, 125 mg, Oral, Q6H, Marivel Ryan APRN, 125 mg at 12/08/19 0531    Antibiotics:  Anti-Infectives (From admission, onward)    Ordered     Dose/Rate Route Frequency Start Stop    12/07/19 1930  vancomycin oral solution reconstituted 125 mg  Review   Ordering Provider:  Marivel Ryan APRN    125 mg Oral Every 6 Hours Scheduled 12/07/19 2030 12/17/19 8929 12/07/19 1251  metroNIDAZOLE (FLAGYL) 500 mg/100mL IVPB  Review   Note to Pharmacy:  Please time from last po dose   Ordering Provider:  Amy Alvarez APRN    500 mg  over 60 Minutes Intravenous Every 8 Hours 12/07/19 1400 12/14/19 1359    12/05/19 0957  piperacillin-tazobactam (ZOSYN) 3.375 g in iso-osmotic dextrose 50 ml (premix)     Ordering Provider:  Deanna Phipps APRN    3.375  g  over 30 Minutes Intravenous Once 19 0959 19 1042    19 0957  vancomycin 1500 mg/500 mL 0.9% NS IVPB ([facility])     Ordering Provider:  Deanna Phipps NIK GUERRABETH    20 mg/kg × 77.1 kg Intravenous Once 19 0959 19 1328            Review of Systems:  Constitutional-- No Fever, chills or sweats.  Appetite good, and no malaise. No fatigue.  HEENT-- No new vision, hearing or throat complaints.  No epistaxis or oral sores.  Denies odynophagia or dysphagia. No headache, photophobia or neck stiffness.  CV-- No chest pain, palpitation or syncope  Resp-- No SOB/cough/Hemoptysis  GI-positive for abdominal pain, intermittent nausea and increasing diarrhea  -- No dysuria, hematuria, or flank pain.  Denies hesitancy, urgency or flank pain.  Lymph- no swollen lymph nodes in neck/axilla or groin.   Heme- No active bruising or bleeding; no Hx of DVT or PE.  MS-- no swelling or pain in the bones or joints of arms/legs.  No new back pain.  Neuro-- No acute focal weakness or numbness in the arms or legs.  No seizures.  Skin--No rashes or lesions      Physical Exam:   Vital Signs  Temp (24hrs), Av.2 °F (36.8 °C), Min:98.2 °F (36.8 °C), Max:98.2 °F (36.8 °C)    Temp  Min: 98.2 °F (36.8 °C)  Max: 98.2 °F (36.8 °C)  BP  Min: 111/73  Max: 133/92  No data recorded  Resp  Min: 18  Max: 18  No data recorded    GENERAL: Awake and alert, in no acute distress.   HEENT: Normocephalic, atraumatic.  PERRL. EOMI. No conjunctival injection. No icterus. Oropharynx clear without evidence of thrush or exudate. No evidence of peridontal disease.    NECK: Supple without nuchal rigidity. No mass.  LYMPH: No cervical, axillary lymphadenopathy.  HEART: RRR; No murmur, rubs, gallops.   LUNGS: Clear to auscultation bilaterally without wheezing, rales, rhonchi. Normal respiratory effort. Nonlabored. No dullness.  ABDOMEN: Soft, nontender, nondistended. Positive bowel sounds. No rebound or guarding. NO mass or HSM.  EXT:  No  cyanosis, clubbing or edema. No cord.  : Genitalia generally unremarkable.  Without Roberts catheter.  MSK: FROM without joint effusions noted arms/legs.    SKIN: Warm and dry without cutaneous eruptions on Inspection/palpation.    NEURO: Oriented to PPT. No focal deficits on motor/sensory exam at arms/legs.  PSYCHIATRIC: Normal insight and judgement. Cooperative with PE    Laboratory Data    Results from last 7 days   Lab Units 12/07/19  1250 12/07/19  0543 12/06/19  0520 12/05/19  0912   WBC 10*3/mm3  --  5.24 5.91 14.20*   HEMOGLOBIN g/dL 9.2* 9.3* 10.0* 14.6   HEMATOCRIT % 28.3* 30.1* 32.2* 43.9   PLATELETS 10*3/mm3  --  261 245 376     Results from last 7 days   Lab Units 12/08/19  0607   SODIUM mmol/L 135*   POTASSIUM mmol/L 3.6   CHLORIDE mmol/L 106   CO2 mmol/L 17.0*   BUN mg/dL 3*   CREATININE mg/dL 0.50*   GLUCOSE mg/dL 103*   CALCIUM mg/dL 8.3*     Results from last 7 days   Lab Units 12/06/19  0520   ALK PHOS U/L 30*   BILIRUBIN mg/dL 0.3   ALT (SGPT) U/L 13   AST (SGOT) U/L 12             Results from last 7 days   Lab Units 12/07/19  1250   LACTATE mmol/L 0.7             Estimated Creatinine Clearance: 138.4 mL/min (A) (by C-G formula based on SCr of 0.5 mg/dL (L)).      Microbiology:  Microbiology Results (last 10 days)     Procedure Component Value - Date/Time    Clostridium Difficile Toxin - Stool, Per Rectum [[id number]] Collected:  12/07/19 1810    Lab Status:  Final result Specimen:  Stool from Per Rectum Updated:  12/07/19 1910    Narrative:       The following orders were created for panel order Clostridium Difficile Toxin - Stool, Per Rectum.  Procedure                               Abnormality         Status                     ---------                               -----------         ------                     Clostridium Difficile To...[[id number]]  Abnormal            Final result                 Please view results for these tests on the individual orders.    Clostridium Difficile Toxin,  PCR - Stool, Per Rectum [[id number]]  (Abnormal) Collected:  12/07/19 1810    Lab Status:  Final result Specimen:  Stool from Per Rectum Updated:  12/07/19 1910     C. Difficile Toxins by PCR Detected    Narrative:       Performance characteristics of test not established for patients <2 years of age.    Blood Culture - Blood, Arm, Right [[id number]] Collected:  12/05/19 0929    Lab Status:  Preliminary result Specimen:  Blood from Arm, Right Updated:  12/08/19 1015     Blood Culture No growth at 3 days    Blood Culture - Blood, Arm, Left [[id number]] Collected:  12/05/19 0925    Lab Status:  Preliminary result Specimen:  Blood from Arm, Left Updated:  12/08/19 1015     Blood Culture No growth at 3 days              Radiology:  Imaging Results (Last 72 Hours)     Procedure Component Value Units Date/Time    CT Abdomen Pelvis With & Without Contrast [[id number]] Collected:  12/07/19 1930     Updated:  12/07/19 1932    Narrative:       CT Abdomen Pelvis WO W    INDICATION:   Diffuse abdominal pain. Gastroenteritis or colitis suspected.    TECHNIQUE:   CT of the abdomen and pelvis without and with IV contrast. Coronal and sagittal reconstructions were obtained.  Radiation dose reduction techniques included automated exposure control or exposure modulation based on body size. Count of known CT and  cardiac nuc med studies performed in previous 12 months: 0.     COMPARISON:   CT abdomen pelvis 12/5/2019    FINDINGS:  Abdomen: Interval development of small bilateral pleural effusions right greater than left. The right-sided effusion layers to a depth of just over 2 cm. Right lower lobe compressive atelectasis. Left inferior hilar nodule or lymph node measuring up to  1.9 cm.    Liver and spleen unremarkable. Gallbladder surgically absent. Pancreas, kidneys and adrenal glands appear normal. The visualized GI tract including appendix unremarkable. Small amount of free fluid within the pelvis.    Pelvis: Bladder unremarkable.  Uterus surgically absent. Osseous structures and soft tissues appear normal.      Impression:       Interval development of small bilateral pleural effusions right greater than left with bibasilar atelectasis.    Left infrahilar lymph node/nodule. This is been noted on prior studies dating back to June 2019 and is not significantly changed favoring a benign etiology but continued follow-up and observation recommended.    Small amount of free fluid within the pelvis but no significant abdominal or pelvic pathology identified.          Signer Name: PILLO Badillo MD   Signed: 12/7/2019 7:30 PM   Workstation Name: [facility]    [facility]    CT Abdomen Pelvis With Contrast [[id number]] Collected:  12/05/19 1129     Updated:  12/05/19 1649    Narrative:       EXAMINATION: CT ABDOMEN PELVIS W CONTRAST- 12/05/2019     INDICATION: Abdominal pain, nausea, vomiting     TECHNIQUE: Contrast enhanced CT imaging of the abdomen and pelvis was  performed with additional coronal and sagittal reformatted imaging  provided for review. Additional delayed imaging of the abdomen and  pelvis in the axial plane was also provided for review.     The radiation dose reduction device was turned on for each scan per the  ALARA (As Low as Reasonably Achievable) protocol.     COMPARISON: CT of the abdomen and pelvis 09/25/2019.     FINDINGS: Visualized lower lungs do not demonstrate acute abnormality.  Scattered punctate granulomas are noted. Identified within the left  lower lobe is a 1.8 cm low-density nodule/lesion. This was previously  seen/evaluated with PET/CT exam performed 06/14/2019.     The liver is unremarkable in appearance. Portal vein appears patent.  Postcholecystectomy changes are identified with mild prominence of the  common bile duct, similar to 09/25/2019 consistent with a  postcholecystectomy state. The spleen is unremarkable. The adrenal  glands and pancreas do not demonstrate acute abnormality.  The kidneys  enhance symmetrically without evidence of mass or obstruction. No  hydronephrosis. Urinary bladder is partially decompressed and otherwise  unremarkable. No pelvic mass identified. No free fluid noted in the  pelvis. The retroperitoneum continues to demonstrate calcified and  noncalcified atherosclerotic plaquing of the abdominal aorta and its  branches, similar to the prior exam. No free air is identified. The  stomach demonstrates a small air-fluid level and is otherwise  unremarkable. The small bowel of the upper abdomen demonstrates a  slightly abnormal configuration and appears bunched within the midline  where there is some abnormal swirling of vessels. This appears  distinctly different when compared to 09/25/2019 however there is no  convincing evidence of bowel obstruction identified at this time and  this finding may be incidental although internal hernia is difficult to  entirely exclude. The remainder of the more distal small bowel is  unrevealing. No evidence of bowel obstruction. The appendix is normal.  Similar cystic changes to the bilateral ovaries with a right ovarian  calcification unchanged from prior exam. The remainder of the colon does  not demonstrate abnormality and is stool filled. Small umbilical hernia  is identified. Dense breast tissue of the lower breast is noted.  Thoracolumbar degenerative changes are identified. The bony pelvis  appears intact. Delayed imaging of abdomen and pelvis demonstrates  contrast within the ureters and urinary bladder without evidence of  obstruction.       Impression:          1. Abnormal configuration of the upper abdominal small bowel with  swirling of internal abdominal mesenteric vessels, new from 09/25/2019  which is of uncertain clinical significance, although an internal hernia  may be present. Given the mild small bowel wall edema identified within  this region gastroenteritis is possible, however recommend correlation  with lactic  acidosis. There is no evidence for bowel obstruction  identified at this time. Correlate clinically.     2. Nonspecific cystic changes to the bilateral ovaries, similar to  09/25/2019. The need for follow-up pelvic ultrasound should be  determined clinically.     3. Redemonstration of a left lower lobe pulmonary nodule as previously  described/evaluated on prior imaging as discussed above.     D:  12/05/2019  E:  12/05/2019     This report was finalized on 12/5/2019 4:46 PM by Dr. Elder Tavares MD.       XR Chest 1 View [[id number]] Collected:  12/05/19 0955     Updated:  12/05/19 1557    Narrative:       EXAMINATION: XR CHEST 1 VW- 12/05/2019     INDICATION: Upper Abdominal Pain Triage Protocol      COMPARISON: Chest radiograph 07/17/2019     FINDINGS: Single portable chest radiograph radiograph was submitted for  review.     The heart is not enlarged. The lungs appear clear. No pleural effusion  or pneumothorax. Visualized upper abdomen is unrevealing. No acute  osseous abnormality is identified.           Impression:       No evidence of active airspace disease.     D:  12/05/2019  E:  12/05/2019     This report was finalized on 12/5/2019 3:53 PM by Dr. Elder Tavares MD.               Impression:  -- SIRS-improving  -- C. difficile colitis  -- Nausea and vomiting-unknown origin  -- Hypertension  -- Leukocytosis-improving/resolved  -- Current ongoing everyday smoker    PLAN/RECOMMENDATIONS:   Thank you for asking us to see Mishel Freeman, I recommend the following:  -- Continue oral vancomycin but increase to 250  -- continue IV Flagyl  -- Continue to follow cultures and sensitivity reports and adjust antibiotics accordingly  -- Continue to follow physical exam, laboratory values and radiological reports and adjust therapies as indicated  -- Continue to monitor fluid electrolyte balance has patient is at risk for dehydration.    Patient's diarrhea has significantly improved therefore I believe it  is okay to discontinue the IV Flagyl at this point.  I will have her continue to cover her with oral vancomycin.  I will increase her dosing from 125 mg to 250 mg as vancomycin is not absorbed systemically.     Dr. Joni Pérez has obtained the history, performed the physical exam and formulated the above treatment plan.     I have seen and examined patient and agree with above    patietn could have colonization vs acute cdiff; will treat for cdiff and if diarrhea improves this implies recent cdiff infection    Will need long term yogurt vs probiotics upon discharge  PATY Tam  12/8/2019  10:21 AM

## 2019-12-08 NOTE — PROGRESS NOTES
[facility]  PROGRESS NOTE    Patient Name: Mishel Freeman  : 1975  MRN: [id number]    Date of Admission: 2019  Primary Care Physician: Anastacia Jose MD    Subjective   Subjective     CC:  Vomiting / abdominal Pain / diarrhea    HPI:    Patient is sitting up in bed eating some flavored ice. She states abd pain has improved since yesterday. Diarrhea has slowed down. Able to tolerate some liquids this am without any Nausea.     Review of Systems    Gen- No fevers, chills  CV- No chest pain, palpitations  Resp- No cough, dyspnea  GI-  + abdominal pain / intermittent nausea / diarrhea improving      Objective   Objective     Vital Signs:   Temp:  [98.2 °F (36.8 °C)] 98.2 °F (36.8 °C)  Resp:  [18] 18  BP: (111-133)/(73-92) 111/73        Physical Exam:    Constitutional: No acute distress, awake, appears comfortable  No visitors at bs.   HENT: NCAT,   Respiratory: Clear to auscultation bilaterally, respiratory effort normal on RA 95%  Cardiovascular: RRR, s1 and s2  Gastrointestinal: hyperactive bowel sounds, + epigastric tenderness on light palpation, no guarding, no rebound. No radiation of pain on palp. No current evidence of acute abd.   Musculoskeletal: No bilateral ankle edema. FIORE spont   Psychiatric: Appropriate affect, cooperative   Neurologic: Oriented x 3, strength symmetric in all extremities, Cranial Nerves grossly intact to confrontation, speech clear and appropriate.  Follows commands   Skin: dry,     Results Reviewed:    Results from last 7 days   Lab Units 19  1250 19  0543 19  0520 19  0912   WBC 10*3/mm3  --  5.24 5.91 14.20*   HEMOGLOBIN g/dL 9.2* 9.3* 10.0* 14.6   HEMATOCRIT % 28.3* 30.1* 32.2* 43.9   PLATELETS 10*3/mm3  --  261 245 376   PROCALCITONIN ng/mL  --  0.03*  --  0.20     Results from last 7 days   Lab Units 19  0607 19  0543 19  0520 19  0912   SODIUM mmol/L 135* 141 136  139   POTASSIUM mmol/L 3.6 4.1 3.1* 3.9   CHLORIDE mmol/L 106 112* 106 99   CO2 mmol/L 17.0* 17.0* 19.0* 20.0*   BUN mg/dL 3* 4* 8 14   CREATININE mg/dL 0.50* 0.49* 0.57 0.66   GLUCOSE mg/dL 103* 82 99 121*   CALCIUM mg/dL 8.3* 7.7* 7.7* 9.7   ALT (SGPT) U/L  --   --  13 19   AST (SGOT) U/L  --   --  12 15   TROPONIN T ng/mL  --   --   --  <0.010     Estimated Creatinine Clearance: 138.4 mL/min (A) (by C-G formula based on SCr of 0.5 mg/dL (L)).    Microbiology Results Abnormal     Procedure Component Value - Date/Time    Blood Culture - Blood, Arm, Right [[id number]] Collected:  12/05/19 0929    Lab Status:  Preliminary result Specimen:  Blood from Arm, Right Updated:  12/08/19 1015     Blood Culture No growth at 3 days    Blood Culture - Blood, Arm, Left [[id number]] Collected:  12/05/19 0925    Lab Status:  Preliminary result Specimen:  Blood from Arm, Left Updated:  12/08/19 1015     Blood Culture No growth at 3 days    Clostridium Difficile Toxin - Stool, Per Rectum [[id number]] Collected:  12/07/19 1810    Lab Status:  Final result Specimen:  Stool from Per Rectum Updated:  12/07/19 1910    Narrative:       The following orders were created for panel order Clostridium Difficile Toxin - Stool, Per Rectum.  Procedure                               Abnormality         Status                     ---------                               -----------         ------                     Clostridium Difficile To...[[id number]]  Abnormal            Final result                 Please view results for these tests on the individual orders.    Clostridium Difficile Toxin, PCR - Stool, Per Rectum [[id number]]  (Abnormal) Collected:  12/07/19 1810    Lab Status:  Final result Specimen:  Stool from Per Rectum Updated:  12/07/19 1910     C. Difficile Toxins by PCR Detected    Narrative:       Performance characteristics of test not established for patients <2 years of age.          Imaging Results (Last 24 Hours)     Procedure  Component Value Units Date/Time    CT Abdomen Pelvis With & Without Contrast [[id number]] Collected:  12/07/19 1930     Updated:  12/07/19 1932    Narrative:       CT Abdomen Pelvis WO W    INDICATION:   Diffuse abdominal pain. Gastroenteritis or colitis suspected.    TECHNIQUE:   CT of the abdomen and pelvis without and with IV contrast. Coronal and sagittal reconstructions were obtained.  Radiation dose reduction techniques included automated exposure control or exposure modulation based on body size. Count of known CT and  cardiac nuc med studies performed in previous 12 months: 0.     COMPARISON:   CT abdomen pelvis 12/5/2019    FINDINGS:  Abdomen: Interval development of small bilateral pleural effusions right greater than left. The right-sided effusion layers to a depth of just over 2 cm. Right lower lobe compressive atelectasis. Left inferior hilar nodule or lymph node measuring up to  1.9 cm.    Liver and spleen unremarkable. Gallbladder surgically absent. Pancreas, kidneys and adrenal glands appear normal. The visualized GI tract including appendix unremarkable. Small amount of free fluid within the pelvis.    Pelvis: Bladder unremarkable. Uterus surgically absent. Osseous structures and soft tissues appear normal.      Impression:       Interval development of small bilateral pleural effusions right greater than left with bibasilar atelectasis.    Left infrahilar lymph node/nodule. This is been noted on prior studies dating back to June 2019 and is not significantly changed favoring a benign etiology but continued follow-up and observation recommended.    Small amount of free fluid within the pelvis but no significant abdominal or pelvic pathology identified.          Signer Name: PILLO Badillo MD   Signed: 12/7/2019 7:30 PM   Workstation Name: [facility]    [facility]               I have reviewed the medications:  Scheduled Meds:    desvenlafaxine 50 mg Oral Daily    enoxaparin 40 mg Subcutaneous Q24H   famotidine 20 mg Intravenous Q12H   metroNIDAZOLE 500 mg Intravenous Q8H   nicotine 1 patch Transdermal Q24H   saccharomyces boulardii 250 mg Oral BID   sodium chloride 10 mL Intravenous Q12H   tiZANidine 4 mg Oral Q8H   vancomycin 125 mg Oral Q6H     Continuous Infusions:    Pharmacy Consult     sodium chloride 75 mL/hr Last Rate: 75 mL/hr (12/08/19 1034)     PRN Meds:.•  aspirin-acetaminophen-caffeine  •  HYDROcodone-acetaminophen  •  HYDROmorphone  •  magnesium sulfate **OR** magnesium sulfate in D5W 1g/100mL (PREMIX) **OR** magnesium sulfate  •  melatonin  •  ondansetron  •  Pharmacy Consult  •  potassium & sodium phosphates **OR** potassium & sodium phosphates  •  potassium chloride  •  potassium chloride  •  promethazine  •  sodium chloride  •  [COMPLETED] Insert peripheral IV **AND** sodium chloride  •  sodium chloride      Assessment/Plan   Assessment / Plan     Active Hospital Problems    Diagnosis  POA   • **Lactic acidosis [E87.2]  Yes   • Abdominal pain [R10.9]  Yes   • N&V (nausea and vomiting) [R11.2]  Unknown   • HTN (hypertension) [I10]  Unknown   • SIRS (systemic inflammatory response syndrome) (CMS/HCC) [R65.10]  Unknown   • Leukocytosis [D72.829]  Unknown   • Nodule of lower lobe of left lung, 2.2 cm noncalcified [R91.1]  Yes   • Current smoker [F17.200]  Yes   • Smoker [F17.200]  Yes      Resolved Hospital Problems   No resolved problems to display.     Brief Hospital Course to date:  Mishel Freeman is a 44 y.o. female who presented with vomiting, abdominal pain and lactic acidosis.    Assessment/plan:    This is my first day taking care of this pt.     Abdominal Pain  Epigastric region  --improved today.    - seen by General Surgery prior and deemed no sx issues  --CT A/P on admit with Abnormal configuration of the upper abdominal small bowel with swirling of internal abdominal mesenteric vessels, new from 09/25/2019  which is of uncertain  clinical significance, although an internal hernia may be present.  - still having diarrhea but improving since vanc started   - on probiotic  --pain source unclear, poss muscular from prior n/v episodes.  However worsening now with NO vomiting issues.  Pain meds and muscle relaxer's   --due to worsening abd pain on 12/7 pt was put back on NPO with ice chips. Stat labs wnl and continue IVFs.  -- stat CT A/P with and without to re-eval due to abn CT mesenteric vessels on last CT.  Did not show any significant abd or pelvic pathology   --reconsult sx IF indicated.   -- Cdiff positive oral vanc and flagyl started and ID consulted  -- ID recommends to  increase oral vanc to 250 mg   -- adv pt diet as she tolerates   -- metabolic acidosis likely due from diarrhea check bmp in am     Dehydration  - low blood pressure, stable  - continue IV fluids for now until po more reliable     Diarrhea/Cdiff  - + cdiff   - change po abx back to IV for now  -- ID following  -- cont oral vanc     Chronic Anxiety  Depression  - on Pristiq (SNRI)    Hypertension  - on lisinopril    Tobacco Use Disorder  - encourage smoking cessation    Pulmonary Nodule  - history of Left Lower Lobe Pulm Nodule  - Bronch in July  - PET in June 2019 suggests possible non calcified 2.2 cm granuloma  -- left inferior hilar nodule 1.9 seen on CT of A/P     Hypokalemia  - supplement potassium        DVT Prophylaxis:  Lovenox 40 mg SC    Disposition: I expect the patient to be discharged home TBD pending medically improved.      CODE STATUS:   Code Status and Medical Interventions:   Ordered at: 12/05/19 1322     Code Status:    CPR     Medical Interventions (Level of Support Prior to Arrest):    Full         Electronically signed by PATY Mabry, 12/08/19, 11:58 AM.

## 2019-12-09 ENCOUNTER — APPOINTMENT (OUTPATIENT)
Dept: GENERAL RADIOLOGY | Facility: HOSPITAL | Age: 44
End: 2019-12-09

## 2019-12-09 LAB
ANION GAP SERPL CALCULATED.3IONS-SCNC: 13 MMOL/L (ref 5–15)
BUN BLD-MCNC: 5 MG/DL (ref 6–20)
BUN/CREAT SERPL: 8.2 (ref 7–25)
CALCIUM SPEC-SCNC: 8.6 MG/DL (ref 8.6–10.5)
CHLORIDE SERPL-SCNC: 109 MMOL/L (ref 98–107)
CO2 SERPL-SCNC: 17 MMOL/L (ref 22–29)
CREAT BLD-MCNC: 0.61 MG/DL (ref 0.57–1)
GFR SERPL CREATININE-BSD FRML MDRD: 106 ML/MIN/1.73
GLUCOSE BLD-MCNC: 109 MG/DL (ref 65–99)
MAGNESIUM SERPL-MCNC: 1.9 MG/DL (ref 1.6–2.6)
POTASSIUM BLD-SCNC: 4.4 MMOL/L (ref 3.5–5.2)
SODIUM BLD-SCNC: 139 MMOL/L (ref 136–145)

## 2019-12-09 PROCEDURE — G0378 HOSPITAL OBSERVATION PER HR: HCPCS

## 2019-12-09 PROCEDURE — 25010000002 ENOXAPARIN PER 10 MG: Performed by: INTERNAL MEDICINE

## 2019-12-09 PROCEDURE — 93005 ELECTROCARDIOGRAM TRACING: CPT | Performed by: NURSE PRACTITIONER

## 2019-12-09 PROCEDURE — 99233 SBSQ HOSP IP/OBS HIGH 50: CPT | Performed by: NURSE PRACTITIONER

## 2019-12-09 PROCEDURE — 25010000002 MAGNESIUM SULFATE 2 GM/50ML SOLUTION: Performed by: INTERNAL MEDICINE

## 2019-12-09 PROCEDURE — 80048 BASIC METABOLIC PNL TOTAL CA: CPT | Performed by: NURSE PRACTITIONER

## 2019-12-09 PROCEDURE — 74018 RADEX ABDOMEN 1 VIEW: CPT

## 2019-12-09 PROCEDURE — 83735 ASSAY OF MAGNESIUM: CPT | Performed by: HOSPITALIST

## 2019-12-09 PROCEDURE — 93010 ELECTROCARDIOGRAM REPORT: CPT | Performed by: INTERNAL MEDICINE

## 2019-12-09 PROCEDURE — 0097U HC BIOFIRE FILMARRAY GI PANEL: CPT | Performed by: HOSPITALIST

## 2019-12-09 RX ORDER — FAMOTIDINE 20 MG/1
20 TABLET, FILM COATED ORAL
Status: DISCONTINUED | OUTPATIENT
Start: 2019-12-09 | End: 2019-12-10 | Stop reason: HOSPADM

## 2019-12-09 RX ADMIN — TIZANIDINE 4 MG: 4 TABLET ORAL at 21:16

## 2019-12-09 RX ADMIN — HYDROCODONE BITARTRATE AND ACETAMINOPHEN 1 TABLET: 5; 325 TABLET ORAL at 05:27

## 2019-12-09 RX ADMIN — VANCOMYCIN HYDROCHLORIDE 250 MG: KIT at 17:34

## 2019-12-09 RX ADMIN — VANCOMYCIN HYDROCHLORIDE 250 MG: KIT at 13:51

## 2019-12-09 RX ADMIN — HYDROCODONE BITARTRATE AND ACETAMINOPHEN 1 TABLET: 5; 325 TABLET ORAL at 21:16

## 2019-12-09 RX ADMIN — FAMOTIDINE 20 MG: 20 TABLET, FILM COATED ORAL at 17:33

## 2019-12-09 RX ADMIN — SODIUM CHLORIDE, PRESERVATIVE FREE 10 ML: 5 INJECTION INTRAVENOUS at 21:18

## 2019-12-09 RX ADMIN — Medication 250 MG: at 21:16

## 2019-12-09 RX ADMIN — Medication 250 MG: at 09:14

## 2019-12-09 RX ADMIN — METRONIDAZOLE 500 MG: 500 INJECTION, SOLUTION INTRAVENOUS at 05:20

## 2019-12-09 RX ADMIN — HYDROCODONE BITARTRATE AND ACETAMINOPHEN 1 TABLET: 5; 325 TABLET ORAL at 13:51

## 2019-12-09 RX ADMIN — MAGNESIUM SULFATE 2 G: 2 INJECTION INTRAVENOUS at 10:31

## 2019-12-09 RX ADMIN — VANCOMYCIN HYDROCHLORIDE 250 MG: KIT at 05:20

## 2019-12-09 RX ADMIN — TIZANIDINE 4 MG: 4 TABLET ORAL at 05:20

## 2019-12-09 RX ADMIN — TIZANIDINE 4 MG: 4 TABLET ORAL at 15:08

## 2019-12-09 RX ADMIN — ENOXAPARIN SODIUM 40 MG: 40 INJECTION SUBCUTANEOUS at 17:33

## 2019-12-09 RX ADMIN — DESVENLAFAXINE SUCCINATE 50 MG: 50 TABLET, EXTENDED RELEASE ORAL at 09:14

## 2019-12-09 RX ADMIN — VANCOMYCIN HYDROCHLORIDE 250 MG: KIT at 00:57

## 2019-12-09 RX ADMIN — FAMOTIDINE 20 MG: 10 INJECTION, SOLUTION INTRAVENOUS at 09:14

## 2019-12-09 NOTE — PROGRESS NOTES
"INFECTIOUS DISEASE CONSULT/INITIAL HOSPITAL VISIT    Mishel Freeman  1975  [id number]    Date of Consult: 12/9/2019    Admission Date: 12/5/2019      Requesting Provider: PATY Ratliff    Evaluating Physician: Joni Pérez MD     Chief complaint: Abdominal pain, nausea vomiting    Current antimicrobial therapy: Vancomycin oral and Flagyl IV    Reason for Consultation: C. difficile    History of present illness:    Ms. Mishel Freeman is a 44 y.o. female being evaluated for C. difficile infection.  She has a past medical history significant for current ongoing tobacco abuse with history of left lower lung field lung nodule status post ERIC biopsy which was negative and is currently being monitored on serial imaging by pulmonary, prior CCY and anxiety.  She presented to the ED on 12/5 for sudden onset nausea and vomiting and subsequent epigastric pain which started that morning as well.  She endorses vomiting of nonbloody emesis that was \"too many times to count\".  Denies diarrhea at that time.  She does however endorse that she had 4 loose bowel movements the day prior to admission which is not uncommon for the patient.  She also states that she has alternating diarrhea and constipation for which she has had previous upper and lower endoscopies (the last being approximately 2 years ago [facility] which was reportedly \"normal\" per patient).  Initial work-up showed that she had a low-grade temp of 100.2 and white blood cell count of 14,000.  Lactate was 5.  Initial CT showed abnormal appearing upper abdominal small bowels with possible swirling of mesenteric vessels.  Per hospitalist note CT was reviewed with Dr. GRANADO with surgery and radiologist who felt that the CT was unchanged from prior without significant abnormalities.  She was subsequently admitted for further evaluation and treated and started on vancomycin IV x1 dose and Zosyn IV that was discontinued on 12/6.  During her stay she " has had worsening diarrhea and was therefore tested for C. difficile yesterday which has become positive.  Infectious disease has been consulted as result of this positive C. difficile testing.  She has a documented allergy to Macrobid with anaphylaxis as a result and she is currently receiving vancomycin oral and Flagyl IV.    12/9/19; has some epidgastric pain, doesn't feel like swallowed food is passing; diarrhea slightly better; no fever, rash, sore throat      Past Medical History:   Diagnosis Date   • Anxiety    • Depression    • Diverticulitis    • Eczema    • GERD (gastroesophageal reflux disease)    • Headache    • Hypertension    • Lung nodule    • Tobacco abuse        Past Surgical History:   Procedure Laterality Date   • BRONCHOSCOPY N/A 7/17/2019    Procedure: DARIN, RADIAL EBUS WITH FLUORO BRONCH;  Surgeon: Nikhil Knutson MD;  Location: [facility] ENDOSCOPY;  Service: Pulmonary   • CHOLECYSTECTOMY  1991   • COLONOSCOPY  2016, 2017   • ENDOMETRIAL ABLATION W/ NOVASURE  2010    WITH LAP BTL   • VAGINAL HYSTERECTOMY  2010    Partial        Family History   Problem Relation Age of Onset   • Breast cancer Paternal Aunt         40's   • Cancer Mother    • Diabetes Father    • Diabetes Brother    • Cancer Maternal Aunt    • Ovarian cancer Neg Hx        Social History     Socioeconomic History   • Marital status:      Spouse name: Not on file   • Number of children: 1   • Years of education: Not on file   • Highest education level: Not on file   Occupational History   • Occupation: [employer]   Tobacco Use   • Smoking status: Current Every Day Smoker     Packs/day: 0.25     Years: 30.00     Pack years: 7.50     Types: Cigarettes   • Smokeless tobacco: Never Used   • Tobacco comment: 5 a day    Substance and Sexual Activity   • Alcohol use: Yes     Alcohol/week: 1.0 standard drinks     Types: 1 Cans of beer per week     Comment: socially   • Drug use: No   • Sexual activity: Defer   Social  History Narrative    Lives in [place].        Allergies   Allergen Reactions   • Macrobid [Nitrofurantoin Macrocrystal] Hives and Shortness Of Breath         Medication:    Current Facility-Administered Medications:   •  aspirin-acetaminophen-caffeine (EXCEDRIN MIGRAINE) per tablet 1 tablet, 1 tablet, Oral, Q6H PRN, James Márquez MD, 1 tablet at 12/08/19 1508  •  desvenlafaxine (PRISTIQ) 24 hr tablet 50 mg, 50 mg, Oral, Daily, James Márquez MD, 50 mg at 12/09/19 0914  •  enoxaparin (LOVENOX) syringe 40 mg, 40 mg, Subcutaneous, Q24H, James Márquez MD, 40 mg at 12/08/19 1509  •  famotidine (PEPCID) injection 20 mg, 20 mg, Intravenous, Q12H, Amy Alvarez APRN, 20 mg at 12/09/19 0914  •  HYDROcodone-acetaminophen (NORCO) 5-325 MG per tablet 1 tablet, 1 tablet, Oral, Q6H PRN, Mann Yin MD, 1 tablet at 12/09/19 0527  •  HYDROmorphone (DILAUDID) injection 0.5 mg, 0.5 mg, Intravenous, Q3H PRN, Amy Alvarez APRN, 0.5 mg at 12/08/19 0534  •  magnesium sulfate 4 gram infusion - Mg less than or equal to 1mg/dL, 4 g, Intravenous, PRN **OR** magnesium sulfate 3 gram infusion (1gm x 3) - Mg 1.1 - 1.5 mg/dL, 1 g, Intravenous, PRN **OR** Magnesium Sulfate 2 gram infusion- Mg 1.6 - 1.9 mg/dL, 2 g, Intravenous, PRN, James Márquez MD, Last Rate: 25 mL/hr at 12/09/19 1031, 2 g at 12/09/19 1031  •  melatonin tablet 5 mg, 5 mg, Oral, Nightly PRN, Radha Garcia DO  •  nicotine (NICODERM CQ) 21 MG/24HR patch 1 patch, 1 patch, Transdermal, Q24H, James Márquez MD  •  ondansetron (ZOFRAN) injection 4 mg, 4 mg, Intravenous, Q6H PRN, James Márquez MD, 4 mg at 12/08/19 1037  •  Pharmacy Consult, , Does not apply, Continuous PRN, Marivel Ryan, APRN  •  potassium & sodium phosphates (PHOS-NAK) 280-160-250 MG packet - for Phosphorus less than 1.25 mg/dL, 2 packet, Oral, Q6H PRN, 2 packet at 12/07/19 2057 **OR** potassium & sodium phosphates (PHOS-NAK) 280-160-250 MG  packet - for Phosphorus 1.25 - 2.5 mg/dL, 2 packet, Oral, Q6H PRN, Mann Yin MD, 2 packet at 12/07/19 1027  •  potassium chloride (KLOR-CON) packet 40 mEq, 40 mEq, Oral, PRN, Mann Yin MD  •  potassium chloride (MICRO-K) CR capsule 40 mEq, 40 mEq, Oral, PRN, Mann Yin MD, 40 mEq at 12/06/19 1551  •  promethazine (PHENERGAN) injection 12.5 mg, 12.5 mg, Intravenous, Q6H PRN, Janice, Sonia, APRN, 12.5 mg at 12/08/19 0553  •  saccharomyces boulardii (FLORASTOR) capsule 250 mg, 250 mg, Oral, BID, Janice, Sonia, APRN, 250 mg at 12/09/19 0914  •  sodium chloride 0.9 % flush 10 mL, 10 mL, Intravenous, PRN, Higinio Vazquez MD  •  [COMPLETED] Insert peripheral IV, , , Once **AND** sodium chloride 0.9 % flush 10 mL, 10 mL, Intravenous, PRN, Higinio Vazquez MD  •  sodium chloride 0.9 % flush 10 mL, 10 mL, Intravenous, Q12H, James Márquez MD, 10 mL at 12/08/19 2126  •  sodium chloride 0.9 % flush 10 mL, 10 mL, Intravenous, PRN, James Márquez MD  •  tiZANidine (ZANAFLEX) tablet 4 mg, 4 mg, Oral, Q8H, Mann Yin MD, 4 mg at 12/09/19 0520  •  vancomycin oral solution reconstituted 250 mg, 250 mg, Oral, Q6H, Andrew Gonzalez APRN, 250 mg at 12/09/19 0520    Antibiotics:  Anti-Infectives (From admission, onward)    Ordered     Dose/Rate Route Frequency Start Stop    12/08/19 1347  vancomycin oral solution reconstituted 250 mg  Review   Ordering Provider:  Andrew Gonzalez APRN    250 mg Oral Every 6 Hours Scheduled 12/08/19 1800 12/17/19 1893    12/05/19 0957  piperacillin-tazobactam (ZOSYN) 3.375 g in iso-osmotic dextrose 50 ml (premix)     Ordering Provider:  Deanna Phipps APRN    3.375 g  over 30 Minutes Intravenous Once 12/05/19 0959 12/05/19 1042    12/05/19 0957  vancomycin 1500 mg/500 mL 0.9% NS IVPB ([facility])     Ordering Provider:  Deanna Phipps APRN    20 mg/kg × 77.1 kg Intravenous Once 12/05/19 0959 12/05/19 1328            Review of Systems:  See  hpi    Physical Exam:   Vital Signs  Temp (24hrs), Av.2 °F (36.8 °C), Min:98.2 °F (36.8 °C), Max:98.2 °F (36.8 °C)    Temp  Min: 98.2 °F (36.8 °C)  Max: 98.2 °F (36.8 °C)  BP  Min: 129/82  Max: 152/96  Pulse  Min: 45  Max: 53  Resp  Min: 18  Max: 18  SpO2  Min: 94 %  Max: 96 %    GENERAL: Awake and alert, in no acute distress.   HEENT: Normocephalic, atraumatic.  PERRL. EOMI. No conjunctival injection. No icterus. Oropharynx clear without evidence of thrush or exudate. No evidence of peridontal disease.      HEART: RRR; No murmur, rubs, gallops.   LUNGS: Clear to auscultation bilaterally without wheezing, rales, rhonchi. Normal respiratory effort. Nonlabored. No dullness.  ABDOMEN: Soft, nontender, nondistended.   EXT:  No cyanosis, clubbing or edema. No cord.    MSK: FROM without joint effusions noted arms/legs.    SKIN: Warm and dry without cutaneous eruptions on Inspection/palpation.    NEURO: Oriented to PPT. No focal deficits on motor/sensory exam at arms/legs.  PSYCHIATRIC: Normal insight and judgement. Cooperative with PE    Laboratory Data    Results from last 7 days   Lab Units 19  1250 19  0543 19  0520 19  0912   WBC 10*3/mm3  --  5.24 5.91 14.20*   HEMOGLOBIN g/dL 9.2* 9.3* 10.0* 14.6   HEMATOCRIT % 28.3* 30.1* 32.2* 43.9   PLATELETS 10*3/mm3  --  261 245 376     Results from last 7 days   Lab Units 19  0607   SODIUM mmol/L 139   POTASSIUM mmol/L 4.4   CHLORIDE mmol/L 109*   CO2 mmol/L 17.0*   BUN mg/dL 5*   CREATININE mg/dL 0.61   GLUCOSE mg/dL 109*   CALCIUM mg/dL 8.6     Results from last 7 days   Lab Units 19  0520   ALK PHOS U/L 30*   BILIRUBIN mg/dL 0.3   ALT (SGPT) U/L 13   AST (SGOT) U/L 12             Results from last 7 days   Lab Units 19  1250   LACTATE mmol/L 0.7             Estimated Creatinine Clearance: 113.4 mL/min (by C-G formula based on SCr of 0.61 mg/dL).      Microbiology:  Microbiology Results (last 10 days)     Procedure Component  Value - Date/Time    Clostridium Difficile Toxin - Stool, Per Rectum [[id number]] Collected:  12/07/19 1810    Lab Status:  Final result Specimen:  Stool from Per Rectum Updated:  12/07/19 1910    Narrative:       The following orders were created for panel order Clostridium Difficile Toxin - Stool, Per Rectum.  Procedure                               Abnormality         Status                     ---------                               -----------         ------                     Clostridium Difficile To...[[id number]]  Abnormal            Final result                 Please view results for these tests on the individual orders.    Clostridium Difficile Toxin, PCR - Stool, Per Rectum [[id number]]  (Abnormal) Collected:  12/07/19 1810    Lab Status:  Final result Specimen:  Stool from Per Rectum Updated:  12/07/19 1910     C. Difficile Toxins by PCR Detected    Narrative:       Performance characteristics of test not established for patients <2 years of age.    Blood Culture - Blood, Arm, Right [[id number]] Collected:  12/05/19 0929    Lab Status:  Preliminary result Specimen:  Blood from Arm, Right Updated:  12/09/19 1109     Blood Culture No growth at 4 days    Blood Culture - Blood, Arm, Left [[id number]] Collected:  12/05/19 0925    Lab Status:  Preliminary result Specimen:  Blood from Arm, Left Updated:  12/09/19 1109     Blood Culture No growth at 4 days              Radiology:  Imaging Results (Last 72 Hours)     Procedure Component Value Units Date/Time    XR Abdomen KUB [[id number]] Collected:  12/09/19 1215     Updated:  12/09/19 1236    Narrative:       EXAMINATION: XR ABDOMEN/KUB-12/09/2019:      INDICATION: Continued abdominal/epigastric pain; K45.8-Other specified  abdominal hernia without obstruction or gangrene; R11.2-Nausea with  vomiting, unspecified; A41.9-Sepsis, unspecified organism.      COMPARISON: NONE.     FINDINGS: Nonspecific, nonobstructive bowel gas pattern with gas and  stool to the level  of the rectum. Questionable thickening of the haustra  may represent colitis in the appropriate clinical setting versus  underdistention of the large bowel. No gross intraperitoneal free air.            Impression:       Nonspecific, nonobstructive bowel gas pattern with  questionable haustral thickening of the large bowel that may represent  colitis in the appropriate clinical setting.     D:  12/09/2019  E:  12/09/2019     This report was finalized on 12/9/2019 12:33 PM by Dr. Christiano Palmer.       CT Abdomen Pelvis With & Without Contrast [[id number]] Collected:  12/07/19 1930     Updated:  12/07/19 1932    Narrative:       CT Abdomen Pelvis WO W    INDICATION:   Diffuse abdominal pain. Gastroenteritis or colitis suspected.    TECHNIQUE:   CT of the abdomen and pelvis without and with IV contrast. Coronal and sagittal reconstructions were obtained.  Radiation dose reduction techniques included automated exposure control or exposure modulation based on body size. Count of known CT and  cardiac nuc med studies performed in previous 12 months: 0.     COMPARISON:   CT abdomen pelvis 12/5/2019    FINDINGS:  Abdomen: Interval development of small bilateral pleural effusions right greater than left. The right-sided effusion layers to a depth of just over 2 cm. Right lower lobe compressive atelectasis. Left inferior hilar nodule or lymph node measuring up to  1.9 cm.    Liver and spleen unremarkable. Gallbladder surgically absent. Pancreas, kidneys and adrenal glands appear normal. The visualized GI tract including appendix unremarkable. Small amount of free fluid within the pelvis.    Pelvis: Bladder unremarkable. Uterus surgically absent. Osseous structures and soft tissues appear normal.      Impression:       Interval development of small bilateral pleural effusions right greater than left with bibasilar atelectasis.    Left infrahilar lymph node/nodule. This is been noted on prior studies dating back to June 2019 and  is not significantly changed favoring a benign etiology but continued follow-up and observation recommended.    Small amount of free fluid within the pelvis but no significant abdominal or pelvic pathology identified.          Signer Name: PILLO Badillo MD   Signed: 12/7/2019 7:30 PM   Workstation Name: [email or web address]    [facility]            Impression:  -- SIRS-improving  -- C. difficile colitis  -- Nausea and vomiting-unknown origin  -- Hypertension  -- Leukocytosis-improving/resolved  -- Current ongoing everyday smoker    PLAN/RECOMMENDATIONS:   Thank you for asking us to see Mishel Freeman, I recommend the following:  -- Continue oral vancomycin but increase to 250 mg q 6h   -- d/c flagyl  -- Continue to follow cultures and sensitivity reports and adjust antibiotics accordingly  -- Continue to follow physical exam, laboratory values and radiological reports and adjust therapies as indicated  -- Continue to monitor fluid electrolyte balance has patient is at risk for dehydration.  ----consider EGD r/o achalasia, esophageal stricture, or further w/u for gastroparesis  D/w Dr. Vincenzo Pérez MD  12/9/2019  1:22 PM

## 2019-12-09 NOTE — PROGRESS NOTES
[facility] Medicine Services  PROGRESS NOTE    Patient Name: Mishel Freeman  : 1975  MRN: [id number]    Date of Admission: 2019  Primary Care Physician: Anastacia Jose MD    Subjective   Subjective     CC:  Vomiting / abdominal Pain / diarrhea    HPI:    1 watery stool since 6am.  Continues to complain of epigastric and BUQ abdominal pain.  Denies n/v but unable to eat due to distention and feeling of fullness    Review of Systems  Gen- No fevers, chills  CV- No chest pain, palpitations  Resp- No cough, dyspnea  GI- as above      Objective   Objective     Vital Signs:   Temp:  [98.2 °F (36.8 °C)] 98.2 °F (36.8 °C)  Heart Rate:  [45-53] 45  Resp:  [18] 18  BP: (129-152)/(82-96) 152/96        Physical Exam:  Constitutional: No acute distress, awake, alert, sitting up in bed  HENT: NCAT, mucous membranes moist  Respiratory: Clear to auscultation bilaterally, respiratory effort normal   Cardiovascular: RRR, no murmurs, rubs, or gallops, palpable pedal pulses bilaterally  Gastrointestinal: Positive bowel sounds, soft, TTP in epigstrum and BUQ  Musculoskeletal: No bilateral ankle edema  Psychiatric: Appropriate affect, cooperative  Neurologic: Oriented x 3, FIORE, speech clear  Skin: No rashes      Results Reviewed:    Results from last 7 days   Lab Units 19  1250 19  0543 19  0520 19  0912   WBC 10*3/mm3  --  5.24 5.91 14.20*   HEMOGLOBIN g/dL 9.2* 9.3* 10.0* 14.6   HEMATOCRIT % 28.3* 30.1* 32.2* 43.9   PLATELETS 10*3/mm3  --  261 245 376   PROCALCITONIN ng/mL  --  0.03*  --  0.20     Results from last 7 days   Lab Units 19  0607 19  0607 19  0543 19  0520 19  0912   SODIUM mmol/L 139 135* 141 136 139   POTASSIUM mmol/L 4.4 3.6 4.1 3.1* 3.9   CHLORIDE mmol/L 109* 106 112* 106 99   CO2 mmol/L 17.0* 17.0* 17.0* 19.0* 20.0*   BUN mg/dL 5* 3* 4* 8 14   CREATININE mg/dL 0.61 0.50* 0.49* 0.57 0.66   GLUCOSE mg/dL 109* 103*  82 99 121*   CALCIUM mg/dL 8.6 8.3* 7.7* 7.7* 9.7   ALT (SGPT) U/L  --   --   --  13 19   AST (SGOT) U/L  --   --   --  12 15   TROPONIN T ng/mL  --   --   --   --  <0.010     Estimated Creatinine Clearance: 113.4 mL/min (by C-G formula based on SCr of 0.61 mg/dL).    Microbiology Results Abnormal     Procedure Component Value - Date/Time    Blood Culture - Blood, Arm, Right [[id number]] Collected:  12/05/19 0929    Lab Status:  Preliminary result Specimen:  Blood from Arm, Right Updated:  12/08/19 1015     Blood Culture No growth at 3 days    Blood Culture - Blood, Arm, Left [[id number]] Collected:  12/05/19 0925    Lab Status:  Preliminary result Specimen:  Blood from Arm, Left Updated:  12/08/19 1015     Blood Culture No growth at 3 days    Clostridium Difficile Toxin - Stool, Per Rectum [[id number]] Collected:  12/07/19 1810    Lab Status:  Final result Specimen:  Stool from Per Rectum Updated:  12/07/19 1910    Narrative:       The following orders were created for panel order Clostridium Difficile Toxin - Stool, Per Rectum.  Procedure                               Abnormality         Status                     ---------                               -----------         ------                     Clostridium Difficile To...[[id number]]  Abnormal            Final result                 Please view results for these tests on the individual orders.    Clostridium Difficile Toxin, PCR - Stool, Per Rectum [[id number]]  (Abnormal) Collected:  12/07/19 1810    Lab Status:  Final result Specimen:  Stool from Per Rectum Updated:  12/07/19 1910     C. Difficile Toxins by PCR Detected    Narrative:       Performance characteristics of test not established for patients <2 years of age.          Imaging Results (Last 24 Hours)     ** No results found for the last 24 hours. **          I have reviewed the medications:  Scheduled Meds:    desvenlafaxine 50 mg Oral Daily   enoxaparin 40 mg Subcutaneous Q24H   famotidine 20 mg  "Intravenous Q12H   metroNIDAZOLE 500 mg Intravenous Q8H   nicotine 1 patch Transdermal Q24H   saccharomyces boulardii 250 mg Oral BID   sodium chloride 10 mL Intravenous Q12H   tiZANidine 4 mg Oral Q8H   vancomycin 250 mg Oral Q6H     Continuous Infusions:    Pharmacy Consult      PRN Meds:.•  aspirin-acetaminophen-caffeine  •  HYDROcodone-acetaminophen  •  HYDROmorphone  •  magnesium sulfate **OR** magnesium sulfate in D5W 1g/100mL (PREMIX) **OR** magnesium sulfate  •  melatonin  •  ondansetron  •  Pharmacy Consult  •  potassium & sodium phosphates **OR** potassium & sodium phosphates  •  potassium chloride  •  potassium chloride  •  promethazine  •  sodium chloride  •  [COMPLETED] Insert peripheral IV **AND** sodium chloride  •  sodium chloride      Assessment/Plan   Assessment / Plan     Active Hospital Problems    Diagnosis  POA   • **Lactic acidosis [E87.2]  Yes   • Abdominal pain [R10.9]  Yes   • N&V (nausea and vomiting) [R11.2]  Unknown   • HTN (hypertension) [I10]  Unknown   • SIRS (systemic inflammatory response syndrome) (CMS/HCC) [R65.10]  Unknown   • Leukocytosis [D72.829]  Unknown   • Nodule of lower lobe of left lung, 2.2 cm noncalcified [R91.1]  Yes   • Current smoker [F17.200]  Yes   • Smoker [F17.200]  Yes      Resolved Hospital Problems   No resolved problems to display.     Brief Hospital Course to date:  Mishel Freeman is a 44 y.o. female who presented with vomiting, abdominal pain and lactic acidosis.    Assessment/plan:    This is my first day providing care to this patient     Epigastric Abdominal Pain  - only slightly improved from admission but still bothersome  - seen by General Surgery prior and deemed no sx issues  - CT A/P on admit with \"abnormal configuration of the upper abdominal small bowel with swirling of internal abdominal mesenteric vessels, new from 09/25/2019 which is of uncertain clinical significance, although an internal hernia may be present.\"  - diarrhea " slowing  - continue probiotic  - pain source unclear, poss muscular from prior n/v episodes.  However worsening now with NO vomiting issues.  Pain meds and muscle relaxer's   - due to worsening abd pain on 12/7 pt was put back on NPO with ice chips. Stat labs wnl and continue IVFs.  STAT CT A/P with and without to re-eval due to abn CT mesenteric vessels on last CT.  Did not show any significant abd or pelvic pathology   - reconsult surgery IF indicated.   - diet as tolerated  - try heat to area   - check KUB    Dehydration, resolved  - IVF off currently  - BP stable    Sinus Bradycardia  - HR in 40's  - asymptomatic  - EKG reviewed    Diarrhea/Cdiff +  - continue vanc and flagyl  - ID following    Chronic Anxiety  Depression  - on Pristiq (SNRI)    Hypertension  - on lisinopril/hctz at home but currently not receiving and BP stable    Tobacco Use Disorder  - encourage smoking cessation  - declining patch    Pulmonary Nodule  - history of Left Lower Lobe Pulm Nodule  - Bronch in July  - PET in June 2019 suggests possible non calcified 2.2 cm granuloma  - left inferior hilar nodule 1.9 seen on CT of A/P     Hypokalemia, resolved  - replace as needed      DVT Prophylaxis:  Lovenox 40 mg SQ    Disposition: I expect the patient to be discharged home TBD pending medically improved.      CODE STATUS:   Code Status and Medical Interventions:   Ordered at: 12/05/19 1322     Code Status:    CPR     Medical Interventions (Level of Support Prior to Arrest):    Full         Electronically signed by PATY Thakur, 12/09/19, 11:02 AM.

## 2019-12-09 NOTE — PROGRESS NOTES
Continued Stay Note  [facility]     Patient Name: Mishel Freeman  MRN: [id number]  Today's Date: 12/9/2019    Admit Date: 12/5/2019    Discharge Plan     Row Name 12/09/19 1045       Plan    Plan  Home    Patient/Family in Agreement with Plan  yes    Plan Comments  Spoke with patient. Plan remains to discharge home when medically ready.   to tranpsport.     Final Discharge Disposition Code  01 - home or self-care        Discharge Codes    No documentation.             Moira Zamudio RN

## 2019-12-09 NOTE — PLAN OF CARE
Problem: Patient Care Overview  Goal: Plan of Care Review  Outcome: Ongoing (interventions implemented as appropriate)  Flowsheets  Taken 12/9/2019 5257  Progress: no change  Outcome Summary: Pt bradycardic all night. No chest pain, EKG done. On room air. No complaints of pain. Tolerated full liquids. Unable to get enough stool to send down, will continue to try. No new concerns. New IV. Will continue to monitor.  Taken 12/9/2019 0405  Plan of Care Reviewed With: patient

## 2019-12-10 VITALS
SYSTOLIC BLOOD PRESSURE: 140 MMHG | BODY MASS INDEX: 32.09 KG/M2 | HEIGHT: 62 IN | TEMPERATURE: 97.5 F | DIASTOLIC BLOOD PRESSURE: 88 MMHG | RESPIRATION RATE: 16 BRPM | OXYGEN SATURATION: 94 % | WEIGHT: 174.4 LBS | HEART RATE: 50 BPM

## 2019-12-10 PROBLEM — R11.2 N&V (NAUSEA AND VOMITING): Status: RESOLVED | Noted: 2019-12-05 | Resolved: 2019-12-10

## 2019-12-10 PROBLEM — E87.20 LACTIC ACIDOSIS: Status: RESOLVED | Noted: 2019-12-05 | Resolved: 2019-12-10

## 2019-12-10 PROBLEM — R65.10 SIRS (SYSTEMIC INFLAMMATORY RESPONSE SYNDROME): Status: RESOLVED | Noted: 2019-12-05 | Resolved: 2019-12-10

## 2019-12-10 LAB
ADV 40+41 DNA STL QL NAA+NON-PROBE: NOT DETECTED
ASTRO TYP 1-8 RNA STL QL NAA+NON-PROBE: NOT DETECTED
BACTERIA SPEC AEROBE CULT: NORMAL
BACTERIA SPEC AEROBE CULT: NORMAL
C CAYETANENSIS DNA STL QL NAA+NON-PROBE: NOT DETECTED
CAMPY SP DNA.DIARRHEA STL QL NAA+PROBE: NOT DETECTED
CRYPTOSP STL CULT: NOT DETECTED
E COLI DNA SPEC QL NAA+PROBE: NOT DETECTED
E HISTOLYT AG STL-ACNC: NOT DETECTED
EAEC PAA PLAS AGGR+AATA ST NAA+NON-PRB: NOT DETECTED
EC STX1 + STX2 GENES STL NAA+PROBE: NOT DETECTED
EPEC EAE GENE STL QL NAA+NON-PROBE: NOT DETECTED
ETEC LTA+ST1A+ST1B TOX ST NAA+NON-PROBE: NOT DETECTED
G LAMBLIA DNA SPEC QL NAA+PROBE: NOT DETECTED
NOROVIRUS GI+II RNA STL QL NAA+NON-PROBE: DETECTED
P SHIGELLOIDES DNA STL QL NAA+PROBE: NOT DETECTED
RV RNA STL NAA+PROBE: NOT DETECTED
SALMONELLA DNA SPEC QL NAA+PROBE: NOT DETECTED
SAPO I+II+IV+V RNA STL QL NAA+NON-PROBE: NOT DETECTED
SHIGELLA SP+EIEC IPAH STL QL NAA+PROBE: NOT DETECTED
V CHOLERAE DNA SPEC QL NAA+PROBE: NOT DETECTED
VIBRIO DNA SPEC NAA+PROBE: NOT DETECTED
YERSINIA STL CULT: NOT DETECTED

## 2019-12-10 PROCEDURE — 99253 IP/OBS CNSLTJ NEW/EST LOW 45: CPT | Performed by: PHYSICIAN ASSISTANT

## 2019-12-10 PROCEDURE — 99239 HOSP IP/OBS DSCHRG MGMT >30: CPT | Performed by: NURSE PRACTITIONER

## 2019-12-10 RX ORDER — L.ACID,PARA/B.BIFIDUM/S.THERM 8B CELL
1 CAPSULE ORAL DAILY
Qty: 30 CAPSULE | Refills: 0 | OUTPATIENT
Start: 2019-12-10 | End: 2021-04-02

## 2019-12-10 RX ORDER — HYDROCODONE BITARTRATE AND ACETAMINOPHEN 5; 325 MG/1; MG/1
1 TABLET ORAL EVERY 6 HOURS PRN
Qty: 12 TABLET | Refills: 0 | OUTPATIENT
Start: 2019-12-10 | End: 2021-04-02

## 2019-12-10 RX ORDER — TIZANIDINE 4 MG/1
4 TABLET ORAL EVERY 8 HOURS PRN
Qty: 21 TABLET | Refills: 0 | Status: SHIPPED | OUTPATIENT
Start: 2019-12-10 | End: 2020-07-24 | Stop reason: SDUPTHER

## 2019-12-10 RX ORDER — FAMOTIDINE 20 MG/1
20 TABLET, FILM COATED ORAL
Qty: 60 TABLET | Refills: 0 | OUTPATIENT
Start: 2019-12-10 | End: 2020-07-24

## 2019-12-10 RX ORDER — L.ACID,PARA/B.BIFIDUM/S.THERM 8B CELL
1 CAPSULE ORAL DAILY
Status: DISCONTINUED | OUTPATIENT
Start: 2019-12-10 | End: 2019-12-10 | Stop reason: HOSPADM

## 2019-12-10 RX ADMIN — FAMOTIDINE 20 MG: 20 TABLET, FILM COATED ORAL at 09:12

## 2019-12-10 RX ADMIN — VANCOMYCIN HYDROCHLORIDE 250 MG: KIT at 06:54

## 2019-12-10 RX ADMIN — VANCOMYCIN HYDROCHLORIDE 250 MG: KIT at 00:45

## 2019-12-10 RX ADMIN — VANCOMYCIN HYDROCHLORIDE 250 MG: KIT at 12:11

## 2019-12-10 RX ADMIN — HYDROCODONE BITARTRATE AND ACETAMINOPHEN 1 TABLET: 5; 325 TABLET ORAL at 07:02

## 2019-12-10 RX ADMIN — HYDROCODONE BITARTRATE AND ACETAMINOPHEN 1 TABLET: 5; 325 TABLET ORAL at 12:11

## 2019-12-10 RX ADMIN — TIZANIDINE 4 MG: 4 TABLET ORAL at 06:54

## 2019-12-10 RX ADMIN — SODIUM CHLORIDE, PRESERVATIVE FREE 10 ML: 5 INJECTION INTRAVENOUS at 09:14

## 2019-12-10 RX ADMIN — DESVENLAFAXINE SUCCINATE 50 MG: 50 TABLET, EXTENDED RELEASE ORAL at 09:12

## 2019-12-10 RX ADMIN — Medication 250 MG: at 09:13

## 2019-12-10 NOTE — PLAN OF CARE
Problem: Patient Care Overview  Goal: Plan of Care Review  Outcome: Ongoing (interventions implemented as appropriate)  Flowsheets (Taken 12/10/2019 0512)  Progress: no change  Plan of Care Reviewed With: patient  Outcome Summary: VSS good through the night. Pt rested well. 1 dose of Norco needed for pain. Oral vanc continues. GI consult in place for today, pt has been NPO since midnight. Will monitor. No additional conceerns.     Problem: Nausea/Vomiting (Adult)  Goal: Adequate Hydration  Outcome: Ongoing (interventions implemented as appropriate)  Flowsheets (Taken 12/10/2019 0512)  Adequate Hydration: making progress toward outcome     Problem: Nausea/Vomiting (Adult)  Goal: Symptom Relief  Outcome: Ongoing (interventions implemented as appropriate)  Flowsheets (Taken 12/10/2019 0512)  Symptom Relief: making progress toward outcome     Problem: Nausea/Vomiting (Adult)  Goal: Identify Related Risk Factors and Signs and Symptoms  Outcome: Ongoing (interventions implemented as appropriate)  Flowsheets (Taken 12/10/2019 0512)  Related Risk Factors (Nausea/Vomiting): female gender; gastric irritation; gastrointestinal dysfunction

## 2019-12-10 NOTE — PROGRESS NOTES
"INFECTIOUS DISEASE CONSULT/INITIAL HOSPITAL VISIT    Mishel Freeman  1975  [id number]    Date of Consult: 12/10/2019    Admission Date: 12/5/2019      Requesting Provider: PATY Ratliff    Evaluating Physician: Joni Pérez MD     Chief complaint: Abdominal pain, nausea vomiting    Current antimicrobial therapy: Vancomycin oral and Flagyl IV    Reason for Consultation: C. difficile    History of present illness:    Ms. Mishel Freeman is a 44 y.o. female being evaluated for C. difficile infection.  She has a past medical history significant for current ongoing tobacco abuse with history of left lower lung field lung nodule status post ERIC biopsy which was negative and is currently being monitored on serial imaging by pulmonary, prior CCY and anxiety.  She presented to the ED on 12/5 for sudden onset nausea and vomiting and subsequent epigastric pain which started that morning as well.  She endorses vomiting of nonbloody emesis that was \"too many times to count\".  Denies diarrhea at that time.  She does however endorse that she had 4 loose bowel movements the day prior to admission which is not uncommon for the patient.  She also states that she has alternating diarrhea and constipation for which she has had previous upper and lower endoscopies (the last being approximately 2 years ago [facility] which was reportedly \"normal\" per patient).  Initial work-up showed that she had a low-grade temp of 100.2 and white blood cell count of 14,000.  Lactate was 5.  Initial CT showed abnormal appearing upper abdominal small bowels with possible swirling of mesenteric vessels.  Per hospitalist note CT was reviewed with Dr. GRANADO with surgery and radiologist who felt that the CT was unchanged from prior without significant abnormalities.  She was subsequently admitted for further evaluation and treated and started on vancomycin IV x1 dose and Zosyn IV that was discontinued on 12/6.  During her stay she " has had worsening diarrhea and was therefore tested for C. difficile yesterday which has become positive.  Infectious disease has been consulted as result of this positive C. difficile testing.  She has a documented allergy to Macrobid with anaphylaxis as a result and she is currently receiving vancomycin oral and Flagyl IV.    12/9/19; has some epidgastric pain, doesn't feel like swallowed food is passing; diarrhea slightly better; no fever, rash, sore throat    12/10/2019; patient is doing well no fevers rash sore throat; diarrhea is better apparently has tested positive for norovirus as well wants to go home today      Past Medical History:   Diagnosis Date   • Anxiety    • Depression    • Diverticulitis    • Eczema    • GERD (gastroesophageal reflux disease)    • Headache    • Hypertension    • Lung nodule    • Tobacco abuse        Past Surgical History:   Procedure Laterality Date   • BRONCHOSCOPY N/A 7/17/2019    Procedure: DARIN, RADIAL EBUS WITH FLUORO BRONCH;  Surgeon: Nikhil Knutson MD;  [facility];  Service: Pulmonary   • CHOLECYSTECTOMY  1991   • COLONOSCOPY  2016, 2017   • ENDOMETRIAL ABLATION W/ NOVASURE  2010    WITH LAP BTL   • VAGINAL HYSTERECTOMY  2010    Partial        Family History   Problem Relation Age of Onset   • Breast cancer Paternal Aunt         40's   • Cancer Mother    • Diabetes Father    • Diabetes Brother    • Cancer Maternal Aunt    • Ovarian cancer Neg Hx        Social History     Socioeconomic History   • Marital status:      Spouse name: Not on file   • Number of children: 1   • Years of education: Not on file   • Highest education level: Not on file   Occupational History   • Occupation: [employer]   Tobacco Use   • Smoking status: Current Every Day Smoker     Packs/day: 0.25     Years: 30.00     Pack years: 7.50     Types: Cigarettes   • Smokeless tobacco: Never Used   • Tobacco comment: 5 a day    Substance and Sexual Activity   • Alcohol  use: Yes     Alcohol/week: 1.0 standard drinks     Types: 1 Cans of beer per week     Comment: socially   • Drug use: No   • Sexual activity: Defer   Social History Narrative    Lives in [place].        Allergies   Allergen Reactions   • Macrobid [Nitrofurantoin Macrocrystal] Hives and Shortness Of Breath         Medication:    Current Facility-Administered Medications:   •  aspirin-acetaminophen-caffeine (EXCEDRIN MIGRAINE) per tablet 1 tablet, 1 tablet, Oral, Q6H PRN, James Márquez MD, 1 tablet at 12/08/19 1508  •  desvenlafaxine (PRISTIQ) 24 hr tablet 50 mg, 50 mg, Oral, Daily, James Márquez MD, 50 mg at 12/10/19 0912  •  enoxaparin (LOVENOX) syringe 40 mg, 40 mg, Subcutaneous, Q24H, James Márquez MD, 40 mg at 12/09/19 1733  •  famotidine (PEPCID) tablet 20 mg, 20 mg, Oral, BID , Anita Montana, [facility], 20 mg at 12/10/19 0912  •  HYDROcodone-acetaminophen (NORCO) 5-325 MG per tablet 1 tablet, 1 tablet, Oral, Q6H PRN, Mann Yin MD, 1 tablet at 12/10/19 1211  •  HYDROmorphone (DILAUDID) injection 0.5 mg, 0.5 mg, Intravenous, Q3H PRN, Amy Avlarez, APRN, 0.5 mg at 12/08/19 0534  •  magnesium sulfate 4 gram infusion - Mg less than or equal to 1mg/dL, 4 g, Intravenous, PRN **OR** magnesium sulfate 3 gram infusion (1gm x 3) - Mg 1.1 - 1.5 mg/dL, 1 g, Intravenous, PRN **OR** Magnesium Sulfate 2 gram infusion- Mg 1.6 - 1.9 mg/dL, 2 g, Intravenous, PRN, James Márquez MD, Stopped at 12/09/19 1351  •  melatonin tablet 5 mg, 5 mg, Oral, Nightly PRN, Radha Garcia, DO  •  nicotine (NICODERM CQ) 21 MG/24HR patch 1 patch, 1 patch, Transdermal, Q24H, James Márquez MD  •  ondansetron (ZOFRAN) injection 4 mg, 4 mg, Intravenous, Q6H PRN, James Márquez MD, 4 mg at 12/08/19 1037  •  Pharmacy Consult, , Does not apply, Continuous PRN, Marivel Ryan, APRN  •  potassium & sodium phosphates (PHOS-NAK) 280-160-250 MG packet - for Phosphorus less than 1.25 mg/dL, 2  packet, Oral, Q6H PRN, 2 packet at 12/07/19 2057 **OR** potassium & sodium phosphates (PHOS-NAK) 280-160-250 MG packet - for Phosphorus 1.25 - 2.5 mg/dL, 2 packet, Oral, Q6H PRN, Mann Yin MD, 2 packet at 12/07/19 1027  •  potassium chloride (KLOR-CON) packet 40 mEq, 40 mEq, Oral, PRN, Mann Yin MD  •  potassium chloride (MICRO-K) CR capsule 40 mEq, 40 mEq, Oral, PRN, Mann Yin MD, 40 mEq at 12/06/19 1551  •  promethazine (PHENERGAN) injection 12.5 mg, 12.5 mg, Intravenous, Q6H PRN, Janice, Sonia, APRN, 12.5 mg at 12/08/19 0553  •  saccharomyces boulardii (FLORASTOR) capsule 250 mg, 250 mg, Oral, BID, Janice, Sonia, APRN, 250 mg at 12/10/19 0913  •  sodium chloride 0.9 % flush 10 mL, 10 mL, Intravenous, PRN, Higinio Vazquez MD  •  [COMPLETED] Insert peripheral IV, , , Once **AND** sodium chloride 0.9 % flush 10 mL, 10 mL, Intravenous, PRN, Higinio Vazquez MD  •  sodium chloride 0.9 % flush 10 mL, 10 mL, Intravenous, Q12H, James Márquez MD, 10 mL at 12/10/19 0914  •  sodium chloride 0.9 % flush 10 mL, 10 mL, Intravenous, PRN, James Márquez MD  •  tiZANidine (ZANAFLEX) tablet 4 mg, 4 mg, Oral, Q8H, Mann Yin MD, 4 mg at 12/10/19 0654  •  vancomycin oral solution reconstituted 250 mg, 250 mg, Oral, Q6H, Andrew Gonzalez, APRN, 250 mg at 12/10/19 1211    Antibiotics:  Anti-Infectives (From admission, onward)    Ordered     Dose/Rate Route Frequency Start Stop    12/08/19 1347  vancomycin oral solution reconstituted 250 mg     Anita Montana RPH reviewed the order on 12/09/19 1420.   Ordering Provider:  Andrew Gonzalez APRN    250 mg Oral Every 6 Hours Scheduled 12/08/19 1800 12/17/19 1759    12/05/19 0957  piperacillin-tazobactam (ZOSYN) 3.375 g in iso-osmotic dextrose 50 ml (premix)     Ordering Provider:  Deanna Phipps APRN    3.375 g  over 30 Minutes Intravenous Once 12/05/19 0959 12/05/19 1042    12/05/19 0957  vancomycin 1500 mg/500 mL 0.9% NS  IVPB ([facility])     Ordering Provider:  Deanna Phipps APRN    20 mg/kg × 77.1 kg Intravenous Once 19 0959 19 1328            Review of Systems:  See hpi    Physical Exam:   Vital Signs  Temp (24hrs), Av.8 °F (36.6 °C), Min:97.5 °F (36.4 °C), Max:98.1 °F (36.7 °C)    Temp  Min: 97.5 °F (36.4 °C)  Max: 98.1 °F (36.7 °C)  BP  Min: 130/83  Max: 140/88  Pulse  Min: 50  Max: 59  Resp  Min: 16  Max: 18  No data recorded    GENERAL: Awake and alert, in no acute distress.   HEENT: Normocephalic, atraumatic.  PERRL. EOMI. No conjunctival injection  HEART: RRR; No murmur, rubs, gallops.   LUNGS: Clear to auscultation bilaterally   ABDOMEN: Soft, nontender, nondistended.   EXT:  No cyanosis, clubbing or edema. No cord.    MSK: FROM without joint effusions noted arms/legs.    SKIN: Warm and dry   NEURO: Oriented to PPT. No focal deficits on motor/sensory exam at arms/legs.  PSYCHIATRIC: Normal insight and judgement. Cooperative with PE    Laboratory Data    Results from last 7 days   Lab Units 19  1250 19  0543 19  0520 19  0912   WBC 10*3/mm3  --  5.24 5.91 14.20*   HEMOGLOBIN g/dL 9.2* 9.3* 10.0* 14.6   HEMATOCRIT % 28.3* 30.1* 32.2* 43.9   PLATELETS 10*3/mm3  --  261 245 376     Results from last 7 days   Lab Units 19  0607   SODIUM mmol/L 139   POTASSIUM mmol/L 4.4   CHLORIDE mmol/L 109*   CO2 mmol/L 17.0*   BUN mg/dL 5*   CREATININE mg/dL 0.61   GLUCOSE mg/dL 109*   CALCIUM mg/dL 8.6     Results from last 7 days   Lab Units 19  0520   ALK PHOS U/L 30*   BILIRUBIN mg/dL 0.3   ALT (SGPT) U/L 13   AST (SGOT) U/L 12             Results from last 7 days   Lab Units 19  1250   LACTATE mmol/L 0.7             Estimated Creatinine Clearance: 113.4 mL/min (by C-G formula based on SCr of 0.61 mg/dL).      Microbiology:  Microbiology Results (last 10 days)     Procedure Component Value - Date/Time    Gastrointestinal Panel, PCR - Stool, Per Rectum [[id number]]  (Abnormal)  Collected:  12/09/19 0601    Lab Status:  Final result Specimen:  Stool from Per Rectum Updated:  12/10/19 0111     Campylobacter Not Detected     Plesiomonas shigelloides Not Detected     Salmonella Not Detected     Vibrio Not Detected     Vibrio cholerae Not Detected     Yersinia enterocolitica Not Detected     Enteroaggregative E. coli (EAEC) Not Detected     Enteropathogenic E. coli (EPEC) Not Detected     Enterotoxigenic E. coli (ETEC) lt/st Not Detected     Shiga-like toxin-producing E. coli (STEC) stx1/stx2 Not Detected     E. coli O157 Not Detected     Shigella/Enteroinvasive E. coli (EIEC) Not Detected     Cryptosporidium Not Detected     Cyclospora cayetanensis Not Detected     Entamoeba histolytica Not Detected     Giardia lamblia Not Detected     Adenovirus F40/41 Not Detected     Astrovirus Not Detected     Norovirus GI/GII Detected     Rotavirus A Not Detected     Sapovirus (I, II, IV or V) Not Detected    Narrative:       If Aeromonas, Staphylococcus aureus or Bacillus cereus are suspected, please order [id number]: Stool Culture, Aeromonas, S aureus, B Cereus.    Clostridium Difficile Toxin - Stool, Per Rectum [[id number]] Collected:  12/07/19 1810    Lab Status:  Final result Specimen:  Stool from Per Rectum Updated:  12/07/19 1910    Narrative:       The following orders were created for panel order Clostridium Difficile Toxin - Stool, Per Rectum.  Procedure                               Abnormality         Status                     ---------                               -----------         ------                     Clostridium Difficile To...[[id number]]  Abnormal            Final result                 Please view results for these tests on the individual orders.    Clostridium Difficile Toxin, PCR - Stool, Per Rectum [[id number]]  (Abnormal) Collected:  12/07/19 1810    Lab Status:  Final result Specimen:  Stool from Per Rectum Updated:  12/07/19 1910     C. Difficile Toxins by PCR Detected     Narrative:       Performance characteristics of test not established for patients <2 years of age.    Blood Culture - Blood, Arm, Right [[id number]] Collected:  12/05/19 0929    Lab Status:  Final result Specimen:  Blood from Arm, Right Updated:  12/10/19 1054     Blood Culture No growth at 5 days    Blood Culture - Blood, Arm, Left [[id number]] Collected:  12/05/19 0925    Lab Status:  Final result Specimen:  Blood from Arm, Left Updated:  12/10/19 1054     Blood Culture No growth at 5 days              Radiology:  Imaging Results (Last 72 Hours)     Procedure Component Value Units Date/Time    XR Abdomen KUB [[id number]] Collected:  12/09/19 1215     Updated:  12/09/19 1236    Narrative:       EXAMINATION: XR ABDOMEN/KUB-12/09/2019:      INDICATION: Continued abdominal/epigastric pain; K45.8-Other specified  abdominal hernia without obstruction or gangrene; R11.2-Nausea with  vomiting, unspecified; A41.9-Sepsis, unspecified organism.      COMPARISON: NONE.     FINDINGS: Nonspecific, nonobstructive bowel gas pattern with gas and  stool to the level of the rectum. Questionable thickening of the haustra  may represent colitis in the appropriate clinical setting versus  underdistention of the large bowel. No gross intraperitoneal free air.            Impression:       Nonspecific, nonobstructive bowel gas pattern with  questionable haustral thickening of the large bowel that may represent  colitis in the appropriate clinical setting.     D:  12/09/2019  E:  12/09/2019     This report was finalized on 12/9/2019 12:33 PM by Dr. Christiano Palmer.       CT Abdomen Pelvis With & Without Contrast [[id number]] Collected:  12/07/19 1930     Updated:  12/07/19 1932    Narrative:       CT Abdomen Pelvis WO W    INDICATION:   Diffuse abdominal pain. Gastroenteritis or colitis suspected.    TECHNIQUE:   CT of the abdomen and pelvis without and with IV contrast. Coronal and sagittal reconstructions were obtained.  Radiation dose reduction  techniques included automated exposure control or exposure modulation based on body size. Count of known CT and  cardiac nuc med studies performed in previous 12 months: 0.     COMPARISON:   CT abdomen pelvis 12/5/2019    FINDINGS:  Abdomen: Interval development of small bilateral pleural effusions right greater than left. The right-sided effusion layers to a depth of just over 2 cm. Right lower lobe compressive atelectasis. Left inferior hilar nodule or lymph node measuring up to  1.9 cm.    Liver and spleen unremarkable. Gallbladder surgically absent. Pancreas, kidneys and adrenal glands appear normal. The visualized GI tract including appendix unremarkable. Small amount of free fluid within the pelvis.    Pelvis: Bladder unremarkable. Uterus surgically absent. Osseous structures and soft tissues appear normal.      Impression:       Interval development of small bilateral pleural effusions right greater than left with bibasilar atelectasis.    Left infrahilar lymph node/nodule. This is been noted on prior studies dating back to June 2019 and is not significantly changed favoring a benign etiology but continued follow-up and observation recommended.    Small amount of free fluid within the pelvis but no significant abdominal or pelvic pathology identified.          Signer Name: PILLO Badillo MD   Signed: 12/7/2019 7:30 PM   Workstation Name: [id number]    [facility]            Impression:  -- SIRS-improving  -- C. difficile colitis  -- Nausea and vomiting-unknown origin  -- Hypertension  -- Leukocytosis-improving/resolved  -- Current ongoing everyday smoker  ---Norovirus gastroenteritis    PLAN/RECOMMENDATIONS:   Thank you for asking us to see Mishel Freeman, I recommend the following:  -- Continue oral vancomycin 125 mg 4 times a day x10 days   - Okay to go home from my standpoint  Given intolerance of yogurt can have probiotics for several weeks    Okay to go home  today  DMCM  Discussed with hospitalist follow-up with me in 1 to 2 weeks  Discharge planning time 15 minutes    Joni Pérez MD  12/10/2019  12:51 PM

## 2019-12-10 NOTE — CONSULTS
"[facility] [place] Consult    Referring Provider: Joni Pérez MD   PCP: Anastacia Jose MD    Reason for Consultation: Epigastric pain     Chief complaint: Epigastric pain     History of present illness:    Mishel Freeman is a 44 y.o. female who is admitted with nausea, vomiting and diarrhea.   She presented to our ED five days ago (12/5/19) with acute onset of nausea and vomiting that morning.   She states she vomited too many times to count.    She had associated abdominal pain and diarrhea.    A CT abdomen/pelvis was performed and was read as abnormal small bowel with \"swirling of the internal abdominal mesenteric vessels\".   This was reviewed with the radiologist, attending physician and surgeon Dr. GRANADO as likely viral gastroenteritis.   Her diarrhea however worsened and C. Difficile PCR was positive.   Infectious disease was consulted and she has been started on oral Vancomycin.   She continues to have epigastric discomfort.    Diarrhea is improving.   Emesis is improving.    She denies any heartburn, dysphagia, odynophagia or chronic abdominal pain.  No melena or hematochezia.     GI panel PCR is positive for Norovirus.       Allergies:  Macrobid [nitrofurantoin macrocrystal]    Scheduled Meds:    desvenlafaxine 50 mg Oral Daily   enoxaparin 40 mg Subcutaneous Q24H   famotidine 20 mg Oral BID AC   nicotine 1 patch Transdermal Q24H   saccharomyces boulardii 250 mg Oral BID   sodium chloride 10 mL Intravenous Q12H   tiZANidine 4 mg Oral Q8H   vancomycin 250 mg Oral Q6H        Infusions:    Pharmacy Consult        PRN Meds:  •  aspirin-acetaminophen-caffeine  •  HYDROcodone-acetaminophen  •  HYDROmorphone  •  magnesium sulfate **OR** magnesium sulfate in D5W 1g/100mL (PREMIX) **OR** magnesium sulfate  •  melatonin  •  ondansetron  •  Pharmacy Consult  •  potassium & sodium phosphates **OR** potassium & sodium phosphates  •  potassium chloride  •  potassium chloride  •  promethazine  •  sodium " chloride  •  [COMPLETED] Insert peripheral IV **AND** sodium chloride  •  sodium chloride    Home Meds:  Medications Prior to Admission   Medication Sig Dispense Refill Last Dose   • aspirin-acetaminophen-caffeine (EXCEDRIN MIGRAINE) 250-250-65 MG per tablet Take 1 tablet by mouth Every 6 (Six) Hours As Needed for Headache.   Past Week at Unknown time   • desvenlafaxine (PRISTIQ) 50 MG 24 hr tablet Take 50 mg by mouth Daily.   12/5/2019 at Unknown time   • lisinopril-hydrochlorothiazide (PRINZIDE,ZESTORETIC) 20-12.5 MG per tablet Take 1 tablet by mouth Daily.   12/5/2019 at Unknown time   • amitriptyline (ELAVIL) 50 MG tablet Take 50 mg by mouth At Night As Needed for Sleep (pt hasnt taken this in months).   More than a month at Unknown time       ROS: Review of Systems   Constitutional: Positive for appetite change and fatigue.   HENT: Negative for trouble swallowing.    Eyes: Negative.    Respiratory: Negative.    Cardiovascular: Negative.    Gastrointestinal: Positive for abdominal pain, nausea and vomiting.   Genitourinary: Negative.    Musculoskeletal: Negative.    Skin: Negative.    Allergic/Immunologic: Negative.    Neurological: Negative.    Hematological: Negative.    Psychiatric/Behavioral: Negative.        PAST MED HX:  Past Medical History:   Diagnosis Date   • Anxiety    • Depression    • Diverticulitis    • Eczema    • GERD (gastroesophageal reflux disease)    • Headache    • Hypertension    • Lung nodule    • Tobacco abuse        PAST SURG HX:  Past Surgical History:   Procedure Laterality Date   • BRONCHOSCOPY N/A 7/17/2019    Procedure: DARIN, RADIAL EBUS WITH FLUORO BRONCH;  Surgeon: Nikhil Knutson MD;  Location: [facility] ENDOSCOPY;  Service: Pulmonary   • CHOLECYSTECTOMY  1991   • COLONOSCOPY  2016, 2017   • ENDOMETRIAL ABLATION W/ NOVASURE  2010    WITH LAP BTL   • VAGINAL HYSTERECTOMY  2010    Partial        FAM HX:  Family History   Problem Relation Age of Onset   • Breast cancer Paternal  "Aunt         40's   • Cancer Mother    • Diabetes Father    • Diabetes Brother    • Cancer Maternal Aunt    • Ovarian cancer Neg Hx        SOC HX:  Social History     Socioeconomic History   • Marital status:      Spouse name: Not on file   • Number of children: 1   • Years of education: Not on file   • Highest education level: Not on file   Occupational History   • Occupation: [employer]   Tobacco Use   • Smoking status: Current Every Day Smoker     Packs/day: 0.25     Years: 30.00     Pack years: 7.50     Types: Cigarettes   • Smokeless tobacco: Never Used   • Tobacco comment: 5 a day    Substance and Sexual Activity   • Alcohol use: Yes     Alcohol/week: 1.0 standard drinks     Types: 1 Cans of beer per week     Comment: socially   • Drug use: No   • Sexual activity: Defer   Social History Narrative    Lives in [place].        PHYSICAL EXAM  /83 (BP Location: Right arm, Patient Position: Lying)   Pulse 59   Temp 98.1 °F (36.7 °C) (Oral)   Resp 18   Ht 157.5 cm (62\")   Wt 79.1 kg (174 lb 6.4 oz)   SpO2 94%   Breastfeeding No   BMI 31.90 kg/m²   Wt Readings from Last 3 Encounters:   12/05/19 79.1 kg (174 lb 6.4 oz)   11/09/19 77.1 kg (170 lb)   10/24/19 78.2 kg (172 lb 6 oz)   ,body mass index is 31.9 kg/m².  Physical Exam   Constitutional: She is oriented to person, place, and time. She appears well-developed and well-nourished.   HENT:   Head: Normocephalic and atraumatic.   Eyes: No scleral icterus.   Neck: Normal range of motion.   Cardiovascular: Normal rate and regular rhythm.   Pulmonary/Chest: Effort normal and breath sounds normal. No respiratory distress.   Abdominal: Soft. Bowel sounds are normal. She exhibits no distension. There is tenderness. There is no guarding.   Tenderness to palpation of the epigastric region    Musculoskeletal: She exhibits no edema.   Neurological: She is alert and oriented to person, place, and time.   Skin: Skin is warm and dry.   Psychiatric: " Her behavior is normal.   Nursing note and vitals reviewed.      Results Review:   I reviewed the patient's new clinical results.    Lab Results   Component Value Date    WBC 5.24 12/07/2019    HGB 9.2 (L) 12/07/2019    HGB 9.3 (L) 12/07/2019    HGB 10.0 (L) 12/06/2019    HCT 28.3 (L) 12/07/2019    .3 (H) 12/07/2019     12/07/2019       Lab Results   Component Value Date    INR 1.04 07/16/2019       Lab Results   Component Value Date    GLUCOSE 109 (H) 12/09/2019    BUN 5 (L) 12/09/2019    CREATININE 0.61 12/09/2019    EGFRIFNONA 106 12/09/2019    BCR 8.2 12/09/2019     12/09/2019    K 4.4 12/09/2019    CO2 17.0 (L) 12/09/2019    CALCIUM 8.6 12/09/2019    ALBUMIN 3.20 (L) 12/06/2019    ALKPHOS 30 (L) 12/06/2019    BILITOT 0.3 12/06/2019    ALT 13 12/06/2019    AST 12 12/06/2019     Component  Ref Range & Units    Campylobacter  Not Detected, Invalid Not Detected    Plesiomonas shigelloides  Not Detected Not Detected    Salmonella  Not Detected Not Detected    Vibrio  Not Detected Not Detected    Vibrio cholerae  Not Detected Not Detected    Yersinia enterocolitica  Not Detected Not Detected    Enteroaggregative E. coli (EAEC)  Not Detected Not Detected    Enteropathogenic E. coli (EPEC)  Not Detected Not Detected    Enterotoxigenic E. coli (ETEC) lt/st  Not Detected Not Detected    Shiga-like toxin-producing E. coli (STEC) stx1/stx2  Not Detected Not Detected    E. coli O157  Not Detected Not Detected    Shigella/Enteroinvasive E. coli (EIEC)  Not Detected Not Detected    Cryptosporidium  Not Detected, Invalid Not Detected    Cyclospora cayetanensis  Not Detected Not Detected    Entamoeba histolytica  Not Detected Not Detected    Giardia lamblia  Not Detected Not Detected    Adenovirus F40/41  Not Detected Not Detected    Astrovirus  Not Detected Not Detected    Norovirus GI/GII  Not Detected Detected  Abnormal     Rotavirus A  Not Detected Not Detected    Sapovirus (I, II, IV or V)  Not Detected  Not Detected      KUB: (as interpreted by radiologist) 12/9/19  FINDINGS: Nonspecific, nonobstructive bowel gas pattern with gas and  stool to the level of the rectum. Questionable thickening of the haustra  may represent colitis in the appropriate clinical setting versus  underdistention of the large bowel. No gross intraperitoneal free air.     IMPRESSION:  Nonspecific, nonobstructive bowel gas pattern with  questionable haustral thickening of the large bowel that may represent  colitis in the appropriate clinical setting.    CT abdomen/pelvis (12/7/19)  IMPRESSION:  Interval development of small bilateral pleural effusions right greater than left with bibasilar atelectasis.   Left infrahilar lymph node/nodule. This is been noted on prior studies dating back to June 2019 and is not significantly changed favoring a benign etiology but continued follow-up and observation recommended.   Small amount of free fluid within the pelvis but no significant abdominal or pelvic pathology identified.    CT Abdomen/Pelvis (12/5/19) (as interpreted by radiologist)   IMPRESSION:   1. Abnormal configuration of the upper abdominal small bowel with  swirling of internal abdominal mesenteric vessels, new from 09/25/2019  which is of uncertain clinical significance, although an internal hernia  may be present. Given the mild small bowel wall edema identified within  this region gastroenteritis is possible, however recommend correlation  with lactic acidosis. There is no evidence for bowel obstruction  identified at this time. Correlate clinically.   2. Nonspecific cystic changes to the bilateral ovaries, similar to  09/25/2019. The need for follow-up pelvic ultrasound should be  determined clinically.   3. Redemonstration of a left lower lobe pulmonary nodule as previously  described/evaluated on prior imaging as discussed above.     ASSESSMENTS/PLANS    1. Acute gastroenteritis, secondary to Norovirus   2. C. Difficile Colitis   3.  Epigastric pain, due to # 1   4. Nausea and vomiting, resolving   5. Abnormal CT abdomen, likely due to norovirus enteritis     GI panel PCR is positive for Norovirus.   This likely explains her initially CT findings of abnormal small bowel (enteritis) as well as her abdominal pain.    >>> Treatment is supportive care.   She is slowly improving inpatient.     >>> Advance diet as tolerated  >>> Treatment of C. Difficile per infectious disease - oral Vancomycin QID     She can follow up with [facility] in 2-3 weeks.       I discussed the patients findings and my recommendations with patient and her .       ALEXEI Ambriz  12/10/19  8:24 AM

## 2019-12-10 NOTE — DISCHARGE SUMMARY
[facility] Medicine Services  DISCHARGE SUMMARY    Patient Name: Mishel Freeman  : 1975  MRN: [id number]    Date of Admission: 2019  9:01 AM  Date of Discharge:  12/10/2019  Primary Care Physician: Anastacia Jose MD    Consults     Date and Time Order Name Status Description    12/10/2019 0030 Inpatient Gastroenterology Consult Completed     2019 Inpatient Infectious Diseases Consult Completed           Hospital Course     Presenting Problem:   Bowel obstruction (CMS/HCC) [K56.609]  Lactic acidosis [E87.2]    Active Hospital Problems    Diagnosis  POA   • Abdominal pain [R10.9]  Yes   • HTN (hypertension) [I10]  Unknown   • Leukocytosis [D72.829]  Unknown   • Nodule of lower lobe of left lung, 2.2 cm noncalcified [R91.1]  Yes   • Current smoker [F17.200]  Yes   • Smoker [F17.200]  Yes      Resolved Hospital Problems    Diagnosis Date Resolved POA   • **Lactic acidosis [E87.2] 12/10/2019 Yes   • N&V (nausea and vomiting) [R11.2] 12/10/2019 Unknown   • SIRS (systemic inflammatory response syndrome) (CMS/HCC) [R65.10] 12/10/2019 Unknown          Hospital Course:  Mishel Freeman is a 44 y.o. female smoker with a history of left lower lung field nodule status post EBUS biopsy which was negative and is currently being monitored on serial imaging by pulmonary, prior cholecystectomy, hypertension, anxiety who was admitted here on 2019 with complaints of nausea, vomiting and epigastric pain.  CT of abdomen pelvis read as abnormal appearing upper abdominal small bowel with possible swirling of mesenteric vessel.  CT was reviewed of my Dr. GRANADO, general surgery, who felt that there was no evidence of internal hernia or small bowel obstruction.  While here her diarrhea worsened and C. difficile PCR was positive, infectious disease was consulted and started patient on oral vancomycin.  Currently patient reports that diarrhea has improved.  While here  [facility] [place] gastroenterology was consulted, GI panel PCR is positive for norovirus, which was felt to explain her initial CT findings of abnormal small bowel enteritis as well as her abdominal pain.  Patient is tolerating her diet well.  She has been cleared by infectious disease, gastroenterology, and the hospital medicine service for discharge home today       Epigastric Abdominal Pain  Norovirus gastroenteritis  - improved  - seen by General Surgery prior and deemed no sx issues  - diarrhea slowing  - continue probiotic  - pain source unclear, poss muscular from prior n/v episodes will send home on muscle relaxer prn    - tolerating diet well     Dehydration, resolved     Sinus Bradycardia  - HR in 40's  - asymptomatic  - EKG reviewed     Diarrhea/Cdiff +  - continue oral vancomycin 125 mg 4 times a day x 10 days  - follow up with ID in 1-2 weeks     Chronic Anxiety  Depression  - on Pristiq (SNRI)     Hypertension  - on lisinopril/hctz at home but currently not receiving and BP stable     Tobacco Use Disorder  - encourage smoking cessation  - declining patch     Pulmonary Nodule  - history of Left Lower Lobe Pulm Nodule  - Bronch in July  - PET in June 2019 suggests possible non calcified 2.2 cm granuloma  - left inferior hilar nodule 1.9 seen on CT of A/P      Hypokalemia, resolved      Discharge Follow Up Recommendations for labs/diagnostics:  -Follow-up with PCP in 1 week  -Follow-up with Dr. Pérez, infectious disease in 1-2 weeks  -Follow-up with [facility] [place] GI in 2 to 3 weeks    Day of Discharge     HPI:   Patient awake sitting up in bed, no family/visitors present.  She continues to have some epigastric discomfort that has improved.  Diarrhea is improving, no nausea or vomiting today.  Tolerating diet well.  Slept well with no overnight issues.    Review of Systems  Gen- No fevers, chills  CV- No chest pain, palpitations  Resp- No cough, dyspnea  GI- No N/V +epigastric abd pain       Otherwise ROS is negative except as  mentioned in the HPI.    Vital Signs:   Temp:  [97.5 °F (36.4 °C)-98.1 °F (36.7 °C)] 97.5 °F (36.4 °C)  Heart Rate:  [50-59] 50  Resp:  [16-18] 16  BP: (130-140)/(83-88) 140/88     Physical Exam:  Constitutional: Awake, alert, resting in bed, no family/visitors present  Eyes: PERRLA, sclerae anicteric, no conjunctival injection  HENT: NCAT, mucous membranes moist  Neck: Supple, no thyromegaly, no lymphadenopathy, trachea midline  Respiratory: Clear to auscultation bilaterally, nonlabored respirations   Cardiovascular: RRR, no murmurs, rubs, or gallops, palpable pedal pulses bilaterally  Gastrointestinal: Positive bowel sounds, soft, mild tenderness epigastric area, nondistended  Musculoskeletal: No bilateral ankle edema, no clubbing or cyanosis to extremities, FIORE  Psychiatric: Appropriate affect, cooperative  Neurologic: Oriented x 3, strength symmetric in all extremities, Cranial Nerves grossly intact to confrontation, speech clear  Skin: No rashes       Pertinent  and/or Most Recent Results     Results from last 7 days   Lab Units 12/09/19  0607 12/08/19  0607 12/07/19  1250 12/07/19  0543 12/06/19  0520 12/05/19  0912   WBC 10*3/mm3  --   --   --  5.24 5.91 14.20*   HEMOGLOBIN g/dL  --   --  9.2* 9.3* 10.0* 14.6   HEMATOCRIT %  --   --  28.3* 30.1* 32.2* 43.9   PLATELETS 10*3/mm3  --   --   --  261 245 376   SODIUM mmol/L 139 135*  --  141 136 139   POTASSIUM mmol/L 4.4 3.6  --  4.1 3.1* 3.9   CHLORIDE mmol/L 109* 106  --  112* 106 99   CO2 mmol/L 17.0* 17.0*  --  17.0* 19.0* 20.0*   BUN mg/dL 5* 3*  --  4* 8 14   CREATININE mg/dL 0.61 0.50*  --  0.49* 0.57 0.66   GLUCOSE mg/dL 109* 103*  --  82 99 121*   CALCIUM mg/dL 8.6 8.3*  --  7.7* 7.7* 9.7     Results from last 7 days   Lab Units 12/06/19  0520 12/05/19  0912   BILIRUBIN mg/dL 0.3 0.5   ALK PHOS U/L 30* 48   ALT (SGPT) U/L 13 19   AST (SGOT) U/L 12 15           Invalid input(s): TG, LDLCALC, LDLREALC  Results from last 7 days   Lab Units 12/07/19  1250  12/07/19 0543 12/06/19 0520 12/05/19 0912   TSH uIU/mL  --  5.860*  --   --   --    TROPONIN T ng/mL  --   --   --   --  <0.010   PROCALCITONIN ng/mL  --  0.03*  --   --  0.20   LACTATE mmol/L 0.7 1.0 1.5   < > 5.1*    < > = values in this interval not displayed.       Brief Urine Lab Results  (Last result in the past 365 days)      Color   Clarity   Blood   Leuk Est   Nitrite   Protein   CREAT   Urine HCG        12/05/19 0913 Yellow Clear Moderate (2+) Negative Negative Negative               Microbiology Results Abnormal     Procedure Component Value - Date/Time    Blood Culture - Blood, Arm, Right [[id number]] Collected:  12/05/19 0929    Lab Status:  Final result Specimen:  Blood from Arm, Right Updated:  12/10/19 1054     Blood Culture No growth at 5 days    Blood Culture - Blood, Arm, Left [[id number]] Collected:  12/05/19 0925    Lab Status:  Final result Specimen:  Blood from Arm, Left Updated:  12/10/19 1054     Blood Culture No growth at 5 days    Gastrointestinal Panel, PCR - Stool, Per Rectum [[id number]]  (Abnormal) Collected:  12/09/19 0601    Lab Status:  Final result Specimen:  Stool from Per Rectum Updated:  12/10/19 0111     Campylobacter Not Detected     Plesiomonas shigelloides Not Detected     Salmonella Not Detected     Vibrio Not Detected     Vibrio cholerae Not Detected     Yersinia enterocolitica Not Detected     Enteroaggregative E. coli (EAEC) Not Detected     Enteropathogenic E. coli (EPEC) Not Detected     Enterotoxigenic E. coli (ETEC) lt/st Not Detected     Shiga-like toxin-producing E. coli (STEC) stx1/stx2 Not Detected     E. coli O157 Not Detected     Shigella/Enteroinvasive E. coli (EIEC) Not Detected     Cryptosporidium Not Detected     Cyclospora cayetanensis Not Detected     Entamoeba histolytica Not Detected     Giardia lamblia Not Detected     Adenovirus F40/41 Not Detected     Astrovirus Not Detected     Norovirus GI/GII Detected     Rotavirus A Not Detected     Sapovirus  (I, II, IV or V) Not Detected    Narrative:       If Aeromonas, Staphylococcus aureus or Bacillus cereus are suspected, please order [id number]: Stool Culture, Aeromonas, S aureus, B Cereus.    Clostridium Difficile Toxin - Stool, Per Rectum [[id number]] Collected:  12/07/19 1810    Lab Status:  Final result Specimen:  Stool from Per Rectum Updated:  12/07/19 1910    Narrative:       The following orders were created for panel order Clostridium Difficile Toxin - Stool, Per Rectum.  Procedure                               Abnormality         Status                     ---------                               -----------         ------                     Clostridium Difficile To...[[id number]]  Abnormal            Final result                 Please view results for these tests on the individual orders.    Clostridium Difficile Toxin, PCR - Stool, Per Rectum [[id number]]  (Abnormal) Collected:  12/07/19 1810    Lab Status:  Final result Specimen:  Stool from Per Rectum Updated:  12/07/19 1910     C. Difficile Toxins by PCR Detected    Narrative:       Performance characteristics of test not established for patients <2 years of age.          Imaging Results (All)     Procedure Component Value Units Date/Time    XR Abdomen KUB [[id number]] Collected:  12/09/19 1215     Updated:  12/09/19 1236    Narrative:       EXAMINATION: XR ABDOMEN/KUB-12/09/2019:      INDICATION: Continued abdominal/epigastric pain; K45.8-Other specified  abdominal hernia without obstruction or gangrene; R11.2-Nausea with  vomiting, unspecified; A41.9-Sepsis, unspecified organism.      COMPARISON: NONE.     FINDINGS: Nonspecific, nonobstructive bowel gas pattern with gas and  stool to the level of the rectum. Questionable thickening of the haustra  may represent colitis in the appropriate clinical setting versus  underdistention of the large bowel. No gross intraperitoneal free air.            Impression:       Nonspecific, nonobstructive bowel gas  pattern with  questionable haustral thickening of the large bowel that may represent  colitis in the appropriate clinical setting.     D:  12/09/2019  E:  12/09/2019     This report was finalized on 12/9/2019 12:33 PM by Dr. Christiano Palmer.       CT Abdomen Pelvis With & Without Contrast [[id number]] Collected:  12/07/19 1930     Updated:  12/07/19 1932    Narrative:       CT Abdomen Pelvis WO W    INDICATION:   Diffuse abdominal pain. Gastroenteritis or colitis suspected.    TECHNIQUE:   CT of the abdomen and pelvis without and with IV contrast. Coronal and sagittal reconstructions were obtained.  Radiation dose reduction techniques included automated exposure control or exposure modulation based on body size. Count of known CT and  cardiac nuc med studies performed in previous 12 months: 0.     COMPARISON:   CT abdomen pelvis 12/5/2019    FINDINGS:  Abdomen: Interval development of small bilateral pleural effusions right greater than left. The right-sided effusion layers to a depth of just over 2 cm. Right lower lobe compressive atelectasis. Left inferior hilar nodule or lymph node measuring up to  1.9 cm.    Liver and spleen unremarkable. Gallbladder surgically absent. Pancreas, kidneys and adrenal glands appear normal. The visualized GI tract including appendix unremarkable. Small amount of free fluid within the pelvis.    Pelvis: Bladder unremarkable. Uterus surgically absent. Osseous structures and soft tissues appear normal.      Impression:       Interval development of small bilateral pleural effusions right greater than left with bibasilar atelectasis.    Left infrahilar lymph node/nodule. This is been noted on prior studies dating back to June 2019 and is not significantly changed favoring a benign etiology but continued follow-up and observation recommended.    Small amount of free fluid within the pelvis but no significant abdominal or pelvic pathology identified.          Signer Name: PILLO Badillo  MD   Signed: 12/7/2019 7:30 PM   Workstation Name: [id number]    [facility]    CT Abdomen Pelvis With Contrast [[id number]] Collected:  12/05/19 1129     Updated:  12/05/19 1649    Narrative:       EXAMINATION: CT ABDOMEN PELVIS W CONTRAST- 12/05/2019     INDICATION: Abdominal pain, nausea, vomiting     TECHNIQUE: Contrast enhanced CT imaging of the abdomen and pelvis was  performed with additional coronal and sagittal reformatted imaging  provided for review. Additional delayed imaging of the abdomen and  pelvis in the axial plane was also provided for review.     The radiation dose reduction device was turned on for each scan per the  ALARA (As Low as Reasonably Achievable) protocol.     COMPARISON: CT of the abdomen and pelvis 09/25/2019.     FINDINGS: Visualized lower lungs do not demonstrate acute abnormality.  Scattered punctate granulomas are noted. Identified within the left  lower lobe is a 1.8 cm low-density nodule/lesion. This was previously  seen/evaluated with PET/CT exam performed 06/14/2019.     The liver is unremarkable in appearance. Portal vein appears patent.  Postcholecystectomy changes are identified with mild prominence of the  common bile duct, similar to 09/25/2019 consistent with a  postcholecystectomy state. The spleen is unremarkable. The adrenal  glands and pancreas do not demonstrate acute abnormality. The kidneys  enhance symmetrically without evidence of mass or obstruction. No  hydronephrosis. Urinary bladder is partially decompressed and otherwise  unremarkable. No pelvic mass identified. No free fluid noted in the  pelvis. The retroperitoneum continues to demonstrate calcified and  noncalcified atherosclerotic plaquing of the abdominal aorta and its  branches, similar to the prior exam. No free air is identified. The  stomach demonstrates a small air-fluid level and is otherwise  unremarkable. The small bowel of the upper abdomen demonstrates a  slightly  abnormal configuration and appears bunched within the midline  where there is some abnormal swirling of vessels. This appears  distinctly different when compared to 09/25/2019 however there is no  convincing evidence of bowel obstruction identified at this time and  this finding may be incidental although internal hernia is difficult to  entirely exclude. The remainder of the more distal small bowel is  unrevealing. No evidence of bowel obstruction. The appendix is normal.  Similar cystic changes to the bilateral ovaries with a right ovarian  calcification unchanged from prior exam. The remainder of the colon does  not demonstrate abnormality and is stool filled. Small umbilical hernia  is identified. Dense breast tissue of the lower breast is noted.  Thoracolumbar degenerative changes are identified. The bony pelvis  appears intact. Delayed imaging of abdomen and pelvis demonstrates  contrast within the ureters and urinary bladder without evidence of  obstruction.       Impression:          1. Abnormal configuration of the upper abdominal small bowel with  swirling of internal abdominal mesenteric vessels, new from 09/25/2019  which is of uncertain clinical significance, although an internal hernia  may be present. Given the mild small bowel wall edema identified within  this region gastroenteritis is possible, however recommend correlation  with lactic acidosis. There is no evidence for bowel obstruction  identified at this time. Correlate clinically.     2. Nonspecific cystic changes to the bilateral ovaries, similar to  09/25/2019. The need for follow-up pelvic ultrasound should be  determined clinically.     3. Redemonstration of a left lower lobe pulmonary nodule as previously  described/evaluated on prior imaging as discussed above.     D:  12/05/2019  E:  12/05/2019     This report was finalized on 12/5/2019 4:46 PM by Dr. Elder Tavares MD.       XR Chest 1 View [[id number]] Collected:  12/05/19 0955      Updated:  12/05/19 1557    Narrative:       EXAMINATION: XR CHEST 1 VW- 12/05/2019     INDICATION: Upper Abdominal Pain Triage Protocol      COMPARISON: Chest radiograph 07/17/2019     FINDINGS: Single portable chest radiograph radiograph was submitted for  review.     The heart is not enlarged. The lungs appear clear. No pleural effusion  or pneumothorax. Visualized upper abdomen is unrevealing. No acute  osseous abnormality is identified.           Impression:       No evidence of active airspace disease.     D:  12/05/2019  E:  12/05/2019     This report was finalized on 12/5/2019 3:53 PM by Dr. Elder Tavares MD.                             Order Current Status    Stool + Yersinia Culture - Stool, Per Rectum In process        Discharge Details        Discharge Medications      New Medications      Instructions Start Date   famotidine 20 MG tablet  Commonly known as:  PEPCID   20 mg, Oral, 2 Times Daily Before Meals      HYDROcodone-acetaminophen 5-325 MG per tablet  Commonly known as:  NORCO   1 tablet, Oral, Every 6 Hours PRN      lactobacillus acidophilus capsule capsule   1 capsule, Oral, Daily      tiZANidine 4 MG tablet  Commonly known as:  ZANAFLEX   4 mg, Oral, Every 8 Hours PRN      vancomycin 50 MG/ML reconstituted solution oral solution reconstituted   125 mg, Oral, Every 6 Hours Scheduled         Continue These Medications      Instructions Start Date   aspirin-acetaminophen-caffeine 250-250-65 MG per tablet  Commonly known as:  EXCEDRIN MIGRAINE   1 tablet, Oral, Every 6 Hours PRN      desvenlafaxine 50 MG 24 hr tablet  Commonly known as:  PRISTIQ   50 mg, Oral, Daily      lisinopril-hydrochlorothiazide 20-12.5 MG per tablet  Commonly known as:  PRINZIDE,ZESTORETIC   1 tablet, Oral, Daily         Stop These Medications    amitriptyline 50 MG tablet  Commonly known as:  ELAVIL            Allergies   Allergen Reactions   • Macrobid [Nitrofurantoin Macrocrystal] Hives and Shortness Of Breath          Discharge Disposition:  Home or Self Care    Diet:  Hospital:  Diet Order   Procedures   • Diet Regular; Low Fiber / Low Residue, Regan       Activity:  Activity Instructions     Activity as Tolerated                   CODE STATUS:    Code Status and Medical Interventions:   Ordered at: 12/05/19 1322     Code Status:    CPR     Medical Interventions (Level of Support Prior to Arrest):    Full       No future appointments.    Additional Instructions for the Follow-ups that You Need to Schedule     Discharge Follow-up with PCP   As directed       Currently Documented PCP:    Anastacia Jose MD    PCP Phone Number:    [phone]     Follow Up Details:  1 week         Discharge Follow-up with Specified Provider: GI in 2-3 weeks   As directed      To:  GI in 2-3 weeks         Discharge Follow-up with Specified Provider: Dr. Elisa SLAUGHTER in 1-2 weeks   As directed      To:  Dr. Elisa SLAUGHTER in 1-2 weeks               Time Spent on Discharge:  50 minutes    Electronically signed by PATY Raya, 12/10/19, 1:07 PM.

## 2019-12-13 LAB
CAMPYLOBACTER STL CULT: NORMAL
E COLI SXT STL QL IA: NEGATIVE
Lab: NORMAL
Lab: NORMAL
SALM + SHIG STL CULT: NORMAL
YERSINIA SPEC CULT: NORMAL
YERSINIA SPEC CULT: NORMAL

## 2020-01-10 ENCOUNTER — TELEPHONE (OUTPATIENT)
Dept: GASTROENTEROLOGY | Facility: CLINIC | Age: 45
End: 2020-01-10

## 2020-01-10 NOTE — TELEPHONE ENCOUNTER
Called patient at [phone] [facility] to call office to rechedule her appt. Phone number [phone]

## 2020-01-24 ENCOUNTER — TELEPHONE (OUTPATIENT)
Dept: CARDIAC SURGERY | Facility: CLINIC | Age: 45
End: 2020-01-24

## 2020-02-07 ENCOUNTER — TELEPHONE (OUTPATIENT)
Dept: CARDIAC SURGERY | Facility: CLINIC | Age: 45
End: 2020-02-07

## 2020-02-10 DIAGNOSIS — R91.1 NODULE OF LOWER LOBE OF LEFT LUNG: Primary | ICD-10-CM

## 2020-02-19 ENCOUNTER — HOSPITAL ENCOUNTER (OUTPATIENT)
Dept: CT IMAGING | Facility: HOSPITAL | Age: 45
Discharge: HOME | End: 2020-02-19
Admitting: PHYSICIAN ASSISTANT

## 2020-02-19 DIAGNOSIS — R91.1 NODULE OF LOWER LOBE OF LEFT LUNG: ICD-10-CM

## 2020-02-19 PROCEDURE — 71260 CT THORAX DX C+: CPT

## 2020-02-19 PROCEDURE — 25010000002 IOPAMIDOL 61 % SOLUTION: Performed by: PHYSICIAN ASSISTANT

## 2020-02-19 RX ADMIN — IOPAMIDOL 75 ML: 612 INJECTION, SOLUTION INTRAVENOUS at 08:06

## 2020-02-24 ENCOUNTER — TRANSCRIBE ORDERS (OUTPATIENT)
Dept: ADMINISTRATIVE | Facility: HOSPITAL | Age: 45
End: 2020-02-24

## 2020-02-24 DIAGNOSIS — Z12.31 VISIT FOR SCREENING MAMMOGRAM: Primary | ICD-10-CM

## 2020-03-20 ENCOUNTER — APPOINTMENT (OUTPATIENT)
Dept: CT IMAGING | Facility: HOSPITAL | Age: 45
End: 2020-03-20

## 2020-03-20 ENCOUNTER — HOSPITAL ENCOUNTER (EMERGENCY)
Facility: HOSPITAL | Age: 45
Discharge: HOME | End: 2020-03-20
Attending: EMERGENCY MEDICINE | Admitting: EMERGENCY MEDICINE

## 2020-03-20 VITALS
WEIGHT: 170 LBS | TEMPERATURE: 99 F | OXYGEN SATURATION: 97 % | BODY MASS INDEX: 30.12 KG/M2 | DIASTOLIC BLOOD PRESSURE: 55 MMHG | SYSTOLIC BLOOD PRESSURE: 111 MMHG | RESPIRATION RATE: 18 BRPM | HEART RATE: 80 BPM | HEIGHT: 63 IN

## 2020-03-20 DIAGNOSIS — K52.9 GASTROENTERITIS, ACUTE: Primary | ICD-10-CM

## 2020-03-20 DIAGNOSIS — R11.2 NON-INTRACTABLE VOMITING WITH NAUSEA, UNSPECIFIED VOMITING TYPE: ICD-10-CM

## 2020-03-20 LAB
ALBUMIN SERPL-MCNC: 4.4 G/DL (ref 3.5–5.2)
ALBUMIN/GLOB SERPL: 1.6 G/DL
ALP SERPL-CCNC: 48 U/L (ref 39–117)
ALT SERPL W P-5'-P-CCNC: 18 U/L (ref 1–33)
ANION GAP SERPL CALCULATED.3IONS-SCNC: 11 MMOL/L (ref 5–15)
AST SERPL-CCNC: 15 U/L (ref 1–32)
BACTERIA UR QL AUTO: ABNORMAL /HPF
BASOPHILS # BLD AUTO: 0.06 10*3/MM3 (ref 0–0.2)
BASOPHILS NFR BLD AUTO: 0.9 % (ref 0–1.5)
BILIRUB SERPL-MCNC: 0.2 MG/DL (ref 0.2–1.2)
BILIRUB UR QL STRIP: NEGATIVE
BUN BLD-MCNC: 9 MG/DL (ref 6–20)
BUN/CREAT SERPL: 14.8 (ref 7–25)
CALCIUM SPEC-SCNC: 9.3 MG/DL (ref 8.6–10.5)
CHLORIDE SERPL-SCNC: 104 MMOL/L (ref 98–107)
CLARITY UR: CLEAR
CO2 SERPL-SCNC: 25 MMOL/L (ref 22–29)
COLOR UR: YELLOW
CREAT BLD-MCNC: 0.61 MG/DL (ref 0.57–1)
DEPRECATED RDW RBC AUTO: 45.3 FL (ref 37–54)
EOSINOPHIL # BLD AUTO: 0.19 10*3/MM3 (ref 0–0.4)
EOSINOPHIL NFR BLD AUTO: 2.7 % (ref 0.3–6.2)
ERYTHROCYTE [DISTWIDTH] IN BLOOD BY AUTOMATED COUNT: 13.4 % (ref 12.3–15.4)
GFR SERPL CREATININE-BSD FRML MDRD: 106 ML/MIN/1.73
GLOBULIN UR ELPH-MCNC: 2.8 GM/DL
GLUCOSE BLD-MCNC: 98 MG/DL (ref 65–99)
GLUCOSE UR STRIP-MCNC: NEGATIVE MG/DL
HCT VFR BLD AUTO: 36.3 % (ref 34–46.6)
HGB BLD-MCNC: 12.1 G/DL (ref 12–15.9)
HGB UR QL STRIP.AUTO: ABNORMAL
HOLD SPECIMEN: NORMAL
HOLD SPECIMEN: NORMAL
HYALINE CASTS UR QL AUTO: ABNORMAL /LPF
IMM GRANULOCYTES # BLD AUTO: 0.02 10*3/MM3 (ref 0–0.05)
IMM GRANULOCYTES NFR BLD AUTO: 0.3 % (ref 0–0.5)
KETONES UR QL STRIP: NEGATIVE
LEUKOCYTE ESTERASE UR QL STRIP.AUTO: NEGATIVE
LIPASE SERPL-CCNC: 21 U/L (ref 13–60)
LYMPHOCYTES # BLD AUTO: 3.09 10*3/MM3 (ref 0.7–3.1)
LYMPHOCYTES NFR BLD AUTO: 44.7 % (ref 19.6–45.3)
MCH RBC QN AUTO: 31.1 PG (ref 26.6–33)
MCHC RBC AUTO-ENTMCNC: 33.3 G/DL (ref 31.5–35.7)
MCV RBC AUTO: 93.3 FL (ref 79–97)
MONOCYTES # BLD AUTO: 0.49 10*3/MM3 (ref 0.1–0.9)
MONOCYTES NFR BLD AUTO: 7.1 % (ref 5–12)
NEUTROPHILS # BLD AUTO: 3.06 10*3/MM3 (ref 1.7–7)
NEUTROPHILS NFR BLD AUTO: 44.3 % (ref 42.7–76)
NITRITE UR QL STRIP: NEGATIVE
NRBC BLD AUTO-RTO: 0 /100 WBC (ref 0–0.2)
PH UR STRIP.AUTO: 5.5 [PH] (ref 5–8)
PLATELET # BLD AUTO: 341 10*3/MM3 (ref 140–450)
PMV BLD AUTO: 8.8 FL (ref 6–12)
POTASSIUM BLD-SCNC: 4.6 MMOL/L (ref 3.5–5.2)
PROT SERPL-MCNC: 7.2 G/DL (ref 6–8.5)
PROT UR QL STRIP: NEGATIVE
RBC # BLD AUTO: 3.89 10*6/MM3 (ref 3.77–5.28)
RBC # UR: ABNORMAL /HPF
REF LAB TEST METHOD: ABNORMAL
SODIUM BLD-SCNC: 140 MMOL/L (ref 136–145)
SP GR UR STRIP: 1.02 (ref 1–1.03)
SQUAMOUS #/AREA URNS HPF: ABNORMAL /HPF
UROBILINOGEN UR QL STRIP: ABNORMAL
WBC NRBC COR # BLD: 6.91 10*3/MM3 (ref 3.4–10.8)
WBC UR QL AUTO: ABNORMAL /HPF
WHOLE BLOOD HOLD SPECIMEN: NORMAL
WHOLE BLOOD HOLD SPECIMEN: NORMAL

## 2020-03-20 PROCEDURE — 81001 URINALYSIS AUTO W/SCOPE: CPT

## 2020-03-20 PROCEDURE — 74176 CT ABD & PELVIS W/O CONTRAST: CPT

## 2020-03-20 PROCEDURE — 80053 COMPREHEN METABOLIC PANEL: CPT

## 2020-03-20 PROCEDURE — 85025 COMPLETE CBC W/AUTO DIFF WBC: CPT

## 2020-03-20 PROCEDURE — 83690 ASSAY OF LIPASE: CPT

## 2020-03-20 PROCEDURE — 96375 TX/PRO/DX INJ NEW DRUG ADDON: CPT

## 2020-03-20 PROCEDURE — 25010000002 MORPHINE PER 10 MG: Performed by: EMERGENCY MEDICINE

## 2020-03-20 PROCEDURE — 25010000002 ONDANSETRON PER 1 MG: Performed by: EMERGENCY MEDICINE

## 2020-03-20 PROCEDURE — 96374 THER/PROPH/DIAG INJ IV PUSH: CPT

## 2020-03-20 PROCEDURE — 99284 EMERGENCY DEPT VISIT MOD MDM: CPT

## 2020-03-20 RX ORDER — ONDANSETRON 2 MG/ML
4 INJECTION INTRAMUSCULAR; INTRAVENOUS ONCE
Status: COMPLETED | OUTPATIENT
Start: 2020-03-20 | End: 2020-03-20

## 2020-03-20 RX ORDER — MORPHINE SULFATE 4 MG/ML
4 INJECTION, SOLUTION INTRAMUSCULAR; INTRAVENOUS ONCE
Status: COMPLETED | OUTPATIENT
Start: 2020-03-20 | End: 2020-03-20

## 2020-03-20 RX ORDER — ONDANSETRON 4 MG/1
4 TABLET, ORALLY DISINTEGRATING ORAL EVERY 6 HOURS PRN
Qty: 20 TABLET | Refills: 0 | Status: SHIPPED | OUTPATIENT
Start: 2020-03-20 | End: 2020-03-25

## 2020-03-20 RX ORDER — SODIUM CHLORIDE 0.9 % (FLUSH) 0.9 %
10 SYRINGE (ML) INJECTION AS NEEDED
Status: DISCONTINUED | OUTPATIENT
Start: 2020-03-20 | End: 2020-03-20 | Stop reason: HOSPADM

## 2020-03-20 RX ADMIN — MORPHINE SULFATE 4 MG: 4 INJECTION, SOLUTION INTRAMUSCULAR; INTRAVENOUS at 17:48

## 2020-03-20 RX ADMIN — SODIUM CHLORIDE 1000 ML: 9 INJECTION, SOLUTION INTRAVENOUS at 17:48

## 2020-03-20 RX ADMIN — ONDANSETRON 4 MG: 2 INJECTION INTRAMUSCULAR; INTRAVENOUS at 17:47

## 2020-04-21 ENCOUNTER — APPOINTMENT (OUTPATIENT)
Dept: MAMMOGRAPHY | Facility: HOSPITAL | Age: 45
End: 2020-04-21

## 2020-05-05 ENCOUNTER — TRANSCRIBE ORDERS (OUTPATIENT)
Dept: ADMINISTRATIVE | Facility: HOSPITAL | Age: 45
End: 2020-05-05

## 2020-05-05 DIAGNOSIS — Z12.31 VISIT FOR SCREENING MAMMOGRAM: Primary | ICD-10-CM

## 2020-06-29 ENCOUNTER — HOSPITAL ENCOUNTER (OUTPATIENT)
Dept: MAMMOGRAPHY | Facility: HOSPITAL | Age: 45
Discharge: HOME | End: 2020-06-29
Admitting: INTERNAL MEDICINE

## 2020-06-29 DIAGNOSIS — Z12.31 VISIT FOR SCREENING MAMMOGRAM: ICD-10-CM

## 2020-06-29 PROCEDURE — 77067 SCR MAMMO BI INCL CAD: CPT | Performed by: RADIOLOGY

## 2020-06-29 PROCEDURE — 77063 BREAST TOMOSYNTHESIS BI: CPT

## 2020-06-29 PROCEDURE — 77067 SCR MAMMO BI INCL CAD: CPT

## 2020-06-29 PROCEDURE — 77063 BREAST TOMOSYNTHESIS BI: CPT | Performed by: RADIOLOGY

## 2020-07-24 ENCOUNTER — HOSPITAL ENCOUNTER (EMERGENCY)
Facility: HOSPITAL | Age: 45
Discharge: HOME | End: 2020-07-24
Attending: EMERGENCY MEDICINE | Admitting: EMERGENCY MEDICINE

## 2020-07-24 ENCOUNTER — APPOINTMENT (OUTPATIENT)
Dept: CT IMAGING | Facility: HOSPITAL | Age: 45
End: 2020-07-24

## 2020-07-24 ENCOUNTER — APPOINTMENT (OUTPATIENT)
Dept: GENERAL RADIOLOGY | Facility: HOSPITAL | Age: 45
End: 2020-07-24

## 2020-07-24 VITALS
RESPIRATION RATE: 22 BRPM | DIASTOLIC BLOOD PRESSURE: 58 MMHG | TEMPERATURE: 98.1 F | OXYGEN SATURATION: 95 % | HEART RATE: 80 BPM | HEIGHT: 62 IN | SYSTOLIC BLOOD PRESSURE: 103 MMHG | WEIGHT: 174 LBS | BODY MASS INDEX: 32.02 KG/M2

## 2020-07-24 DIAGNOSIS — R10.13 EPIGASTRIC PAIN: Primary | ICD-10-CM

## 2020-07-24 LAB
ALBUMIN SERPL-MCNC: 4.5 G/DL (ref 3.5–5.2)
ALBUMIN/GLOB SERPL: 1.6 G/DL
ALP SERPL-CCNC: 45 U/L (ref 39–117)
ALT SERPL W P-5'-P-CCNC: 20 U/L (ref 1–33)
ANION GAP SERPL CALCULATED.3IONS-SCNC: 11 MMOL/L (ref 5–15)
AST SERPL-CCNC: 16 U/L (ref 1–32)
BACTERIA UR QL AUTO: ABNORMAL /HPF
BASOPHILS # BLD AUTO: 0.05 10*3/MM3 (ref 0–0.2)
BASOPHILS NFR BLD AUTO: 0.5 % (ref 0–1.5)
BILIRUB SERPL-MCNC: 0.4 MG/DL (ref 0–1.2)
BILIRUB UR QL STRIP: NEGATIVE
BUN SERPL-MCNC: 10 MG/DL (ref 6–20)
BUN/CREAT SERPL: 12.8 (ref 7–25)
CALCIUM SPEC-SCNC: 9.8 MG/DL (ref 8.6–10.5)
CHLORIDE SERPL-SCNC: 101 MMOL/L (ref 98–107)
CLARITY UR: ABNORMAL
CO2 SERPL-SCNC: 24 MMOL/L (ref 22–29)
COLOR UR: YELLOW
CREAT SERPL-MCNC: 0.78 MG/DL (ref 0.57–1)
D-LACTATE SERPL-SCNC: 1.3 MMOL/L (ref 0.5–2)
DEPRECATED RDW RBC AUTO: 43.6 FL (ref 37–54)
EOSINOPHIL # BLD AUTO: 0.15 10*3/MM3 (ref 0–0.4)
EOSINOPHIL NFR BLD AUTO: 1.6 % (ref 0.3–6.2)
ERYTHROCYTE [DISTWIDTH] IN BLOOD BY AUTOMATED COUNT: 12.9 % (ref 12.3–15.4)
GFR SERPL CREATININE-BSD FRML MDRD: 80 ML/MIN/1.73
GLOBULIN UR ELPH-MCNC: 2.8 GM/DL
GLUCOSE SERPL-MCNC: 109 MG/DL (ref 65–99)
GLUCOSE UR STRIP-MCNC: NEGATIVE MG/DL
HCT VFR BLD AUTO: 40.4 % (ref 34–46.6)
HGB BLD-MCNC: 13.8 G/DL (ref 12–15.9)
HGB UR QL STRIP.AUTO: ABNORMAL
HOLD SPECIMEN: NORMAL
HOLD SPECIMEN: NORMAL
HYALINE CASTS UR QL AUTO: ABNORMAL /LPF
IMM GRANULOCYTES # BLD AUTO: 0.04 10*3/MM3 (ref 0–0.05)
IMM GRANULOCYTES NFR BLD AUTO: 0.4 % (ref 0–0.5)
KETONES UR QL STRIP: NEGATIVE
LEUKOCYTE ESTERASE UR QL STRIP.AUTO: NEGATIVE
LIPASE SERPL-CCNC: 25 U/L (ref 13–60)
LYMPHOCYTES # BLD AUTO: 2.93 10*3/MM3 (ref 0.7–3.1)
LYMPHOCYTES NFR BLD AUTO: 31.7 % (ref 19.6–45.3)
MCH RBC QN AUTO: 31.6 PG (ref 26.6–33)
MCHC RBC AUTO-ENTMCNC: 34.2 G/DL (ref 31.5–35.7)
MCV RBC AUTO: 92.4 FL (ref 79–97)
MONOCYTES # BLD AUTO: 0.48 10*3/MM3 (ref 0.1–0.9)
MONOCYTES NFR BLD AUTO: 5.2 % (ref 5–12)
NEUTROPHILS NFR BLD AUTO: 5.6 10*3/MM3 (ref 1.7–7)
NEUTROPHILS NFR BLD AUTO: 60.6 % (ref 42.7–76)
NITRITE UR QL STRIP: NEGATIVE
NRBC BLD AUTO-RTO: 0 /100 WBC (ref 0–0.2)
PH UR STRIP.AUTO: 7.5 [PH] (ref 5–8)
PLATELET # BLD AUTO: 328 10*3/MM3 (ref 140–450)
PMV BLD AUTO: 8.9 FL (ref 6–12)
POTASSIUM SERPL-SCNC: 4.1 MMOL/L (ref 3.5–5.2)
PROT SERPL-MCNC: 7.3 G/DL (ref 6–8.5)
PROT UR QL STRIP: NEGATIVE
RBC # BLD AUTO: 4.37 10*6/MM3 (ref 3.77–5.28)
RBC # UR: ABNORMAL /HPF
REF LAB TEST METHOD: ABNORMAL
SODIUM SERPL-SCNC: 136 MMOL/L (ref 136–145)
SP GR UR STRIP: 1.01 (ref 1–1.03)
SQUAMOUS #/AREA URNS HPF: ABNORMAL /HPF
TROPONIN T SERPL-MCNC: <0.01 NG/ML (ref 0–0.03)
UROBILINOGEN UR QL STRIP: ABNORMAL
WBC # BLD AUTO: 9.25 10*3/MM3 (ref 3.4–10.8)
WBC UR QL AUTO: ABNORMAL /HPF
WHOLE BLOOD HOLD SPECIMEN: NORMAL
WHOLE BLOOD HOLD SPECIMEN: NORMAL

## 2020-07-24 PROCEDURE — 25010000002 IOPAMIDOL 61 % SOLUTION: Performed by: EMERGENCY MEDICINE

## 2020-07-24 PROCEDURE — 99284 EMERGENCY DEPT VISIT MOD MDM: CPT

## 2020-07-24 PROCEDURE — 85025 COMPLETE CBC W/AUTO DIFF WBC: CPT | Performed by: EMERGENCY MEDICINE

## 2020-07-24 PROCEDURE — 25010000002 HYDROMORPHONE PER 4 MG: Performed by: PHYSICIAN ASSISTANT

## 2020-07-24 PROCEDURE — 25010000002 MORPHINE PER 10 MG: Performed by: EMERGENCY MEDICINE

## 2020-07-24 PROCEDURE — 84484 ASSAY OF TROPONIN QUANT: CPT | Performed by: EMERGENCY MEDICINE

## 2020-07-24 PROCEDURE — 71045 X-RAY EXAM CHEST 1 VIEW: CPT

## 2020-07-24 PROCEDURE — 93005 ELECTROCARDIOGRAM TRACING: CPT | Performed by: EMERGENCY MEDICINE

## 2020-07-24 PROCEDURE — 25010000002 HYDROMORPHONE PER 4 MG: Performed by: EMERGENCY MEDICINE

## 2020-07-24 PROCEDURE — 87040 BLOOD CULTURE FOR BACTERIA: CPT | Performed by: EMERGENCY MEDICINE

## 2020-07-24 PROCEDURE — 96375 TX/PRO/DX INJ NEW DRUG ADDON: CPT

## 2020-07-24 PROCEDURE — 96374 THER/PROPH/DIAG INJ IV PUSH: CPT

## 2020-07-24 PROCEDURE — 83605 ASSAY OF LACTIC ACID: CPT | Performed by: EMERGENCY MEDICINE

## 2020-07-24 PROCEDURE — 83690 ASSAY OF LIPASE: CPT | Performed by: EMERGENCY MEDICINE

## 2020-07-24 PROCEDURE — 74177 CT ABD & PELVIS W/CONTRAST: CPT

## 2020-07-24 PROCEDURE — 80053 COMPREHEN METABOLIC PANEL: CPT | Performed by: EMERGENCY MEDICINE

## 2020-07-24 PROCEDURE — 96376 TX/PRO/DX INJ SAME DRUG ADON: CPT

## 2020-07-24 PROCEDURE — 81001 URINALYSIS AUTO W/SCOPE: CPT | Performed by: EMERGENCY MEDICINE

## 2020-07-24 PROCEDURE — 25010000002 ONDANSETRON PER 1 MG: Performed by: EMERGENCY MEDICINE

## 2020-07-24 RX ORDER — ONDANSETRON 2 MG/ML
4 INJECTION INTRAMUSCULAR; INTRAVENOUS ONCE
Status: COMPLETED | OUTPATIENT
Start: 2020-07-24 | End: 2020-07-24

## 2020-07-24 RX ORDER — SODIUM CHLORIDE 0.9 % (FLUSH) 0.9 %
10 SYRINGE (ML) INJECTION AS NEEDED
Status: DISCONTINUED | OUTPATIENT
Start: 2020-07-24 | End: 2020-07-24 | Stop reason: HOSPADM

## 2020-07-24 RX ORDER — SUCRALFATE 1 G/1
1 TABLET ORAL 4 TIMES DAILY
Qty: 40 TABLET | Refills: 0 | Status: SHIPPED | OUTPATIENT
Start: 2020-07-24 | End: 2020-08-03

## 2020-07-24 RX ORDER — HYDROMORPHONE HYDROCHLORIDE 1 MG/ML
0.5 INJECTION, SOLUTION INTRAMUSCULAR; INTRAVENOUS; SUBCUTANEOUS ONCE
Status: COMPLETED | OUTPATIENT
Start: 2020-07-24 | End: 2020-07-24

## 2020-07-24 RX ORDER — MORPHINE SULFATE 4 MG/ML
4 INJECTION, SOLUTION INTRAMUSCULAR; INTRAVENOUS ONCE
Status: COMPLETED | OUTPATIENT
Start: 2020-07-24 | End: 2020-07-24

## 2020-07-24 RX ORDER — FAMOTIDINE 10 MG/ML
20 INJECTION, SOLUTION INTRAVENOUS ONCE
Status: COMPLETED | OUTPATIENT
Start: 2020-07-24 | End: 2020-07-24

## 2020-07-24 RX ORDER — LIDOCAINE HYDROCHLORIDE 20 MG/ML
15 SOLUTION OROPHARYNGEAL ONCE
Status: COMPLETED | OUTPATIENT
Start: 2020-07-24 | End: 2020-07-24

## 2020-07-24 RX ORDER — ALUMINA, MAGNESIA, AND SIMETHICONE 2400; 2400; 240 MG/30ML; MG/30ML; MG/30ML
15 SUSPENSION ORAL ONCE
Status: COMPLETED | OUTPATIENT
Start: 2020-07-24 | End: 2020-07-24

## 2020-07-24 RX ORDER — TIZANIDINE 4 MG/1
4 TABLET ORAL EVERY 8 HOURS PRN
Qty: 21 TABLET | Refills: 0 | OUTPATIENT
Start: 2020-07-24 | End: 2021-04-02

## 2020-07-24 RX ORDER — FAMOTIDINE 40 MG/1
40 TABLET, FILM COATED ORAL DAILY
Qty: 30 TABLET | Refills: 0 | Status: SHIPPED | OUTPATIENT
Start: 2020-07-24 | End: 2020-08-23

## 2020-07-24 RX ADMIN — HYDROMORPHONE HYDROCHLORIDE 0.5 MG: 1 INJECTION, SOLUTION INTRAMUSCULAR; INTRAVENOUS; SUBCUTANEOUS at 19:01

## 2020-07-24 RX ADMIN — MORPHINE SULFATE 4 MG: 4 INJECTION, SOLUTION INTRAMUSCULAR; INTRAVENOUS at 16:06

## 2020-07-24 RX ADMIN — FAMOTIDINE 20 MG: 10 INJECTION, SOLUTION INTRAVENOUS at 16:06

## 2020-07-24 RX ADMIN — LIDOCAINE HYDROCHLORIDE 15 ML: 20 SOLUTION ORAL; TOPICAL at 20:56

## 2020-07-24 RX ADMIN — HYDROMORPHONE HYDROCHLORIDE 0.5 MG: 1 INJECTION, SOLUTION INTRAMUSCULAR; INTRAVENOUS; SUBCUTANEOUS at 17:02

## 2020-07-24 RX ADMIN — ALUMINUM HYDROXIDE, MAGNESIUM HYDROXIDE, AND DIMETHICONE 15 ML: 400; 400; 40 SUSPENSION ORAL at 20:55

## 2020-07-24 RX ADMIN — IOPAMIDOL 100 ML: 612 INJECTION, SOLUTION INTRAVENOUS at 17:40

## 2020-07-24 RX ADMIN — ONDANSETRON 4 MG: 2 INJECTION INTRAMUSCULAR; INTRAVENOUS at 16:05

## 2020-07-25 NOTE — ED PROVIDER NOTES
Subjective   Pt is a 43 yo female presenting to ED with complaints of abdominal pain. Pt reports upper abdominal pain began 2 days ago and is sharp. She has tried antiacids without relief. She has nausea but denies vomiting or diarrhea. LBM today, WNL. She hasn't tried eating due to the pain. She had a similar episode in December and was admitted for several days. She states unsure of diagnosis causing her pain at that time but did have Cdiff. She denies recent antibiotics. She denies sick contacts. She denies recent vomiting or diarrhea but does have nausea. She denies fever, chills, CP, SOB, cough or urinary sx. Her prior abdominal surgical hx includes cholecystectomy and hysterectomy. PMHx significant for HTN, HLD, Anxiety, Depression, GERD and Diverticulitis. She uses tobacco and ETOH but denies drug use.       History provided by:  Patient and medical records  Abdominal Pain   Pain location:  Epigastric  Pain quality: cramping and sharp    Pain radiates to:  Does not radiate  Pain severity:  Moderate  Duration:  2 days  Timing:  Constant  Chronicity:  Recurrent  Context: not diet changes, not recent illness, not recent travel, not sick contacts, not suspicious food intake and not trauma    Relieved by:  Nothing  Worsened by:  Nothing  Ineffective treatments:  Antacids  Associated symptoms: nausea    Associated symptoms: no chest pain, no constipation, no cough, no diarrhea, no dysuria, no fatigue, no fever, no hematemesis, no hematuria, no shortness of breath, no sore throat and no vomiting    Risk factors: no alcohol abuse, has not had multiple surgeries, no NSAID use and no recent hospitalization        Review of Systems   Constitutional: Negative for fatigue and fever.   HENT: Negative for congestion and sore throat.    Respiratory: Negative for cough and shortness of breath.    Cardiovascular: Negative for chest pain and leg swelling.   Gastrointestinal: Positive for abdominal pain and nausea. Negative for  blood in stool, constipation, diarrhea, hematemesis and vomiting.   Genitourinary: Negative for difficulty urinating, dysuria, flank pain, frequency and hematuria.   Musculoskeletal: Negative for arthralgias, back pain and neck pain.   Neurological: Negative for dizziness and weakness.   All other systems reviewed and are negative.      Past Medical History:   Diagnosis Date   • Anxiety    • Depression    • Diverticulitis    • Eczema    • GERD (gastroesophageal reflux disease)    • Headache    • Hypertension    • Lung nodule    • Tobacco abuse        Allergies   Allergen Reactions   • Macrobid [Nitrofurantoin Macrocrystal] Hives and Shortness Of Breath       Past Surgical History:   Procedure Laterality Date   • BRONCHOSCOPY N/A 7/17/2019    Procedure: DARIN, RADIAL EBUS WITH FLUORO BRONCH;  Surgeon: Nikhil Knutson MD;  Location: [facility] ENDOSCOPY;  Service: Pulmonary   • CHOLECYSTECTOMY  1991   • COLONOSCOPY  2016, 2017   • ENDOMETRIAL ABLATION W/ NOVASURE  2010    WITH LAP BTL   • VAGINAL HYSTERECTOMY  2010    Partial        Family History   Problem Relation Age of Onset   • Breast cancer Paternal Aunt         40's   • Cancer Mother    • Diabetes Father    • Diabetes Brother    • Cancer Maternal Aunt    • Ovarian cancer Neg Hx        Social History     Socioeconomic History   • Marital status:      Spouse name: Not on file   • Number of children: 1   • Years of education: Not on file   • Highest education level: Not on file   Occupational History   • Occupation: [employer]   Tobacco Use   • Smoking status: Current Every Day Smoker     Packs/day: 0.25     Years: 30.00     Pack years: 7.50     Types: Cigarettes   • Smokeless tobacco: Never Used   • Tobacco comment: 5 a day    Substance and Sexual Activity   • Alcohol use: Yes     Alcohol/week: 1.0 standard drinks     Types: 1 Cans of beer per week     Comment: socially   • Drug use: No   • Sexual activity: Defer   Social History Narrative     Lives in [place].            Objective   Physical Exam   Constitutional: She is oriented to person, place, and time. Vital signs are normal. She appears well-developed.   HENT:   Head: Atraumatic.   Nose: Nose normal.   Mouth/Throat: Mucous membranes are normal.   Eyes: Pupils are equal, round, and reactive to light. Conjunctivae, EOM and lids are normal.   Neck: Normal range of motion. Neck supple.   Cardiovascular: Normal rate, regular rhythm and normal heart sounds.   Pulmonary/Chest: Effort normal and breath sounds normal. She has no wheezes.   Abdominal: Soft. She exhibits no distension. There is tenderness in the epigastric area. There is no rebound, no guarding and no CVA tenderness.   Musculoskeletal: Normal range of motion. She exhibits no edema or tenderness.   Neurological: She is alert and oriented to person, place, and time. No sensory deficit.   Skin: Skin is warm and dry. No rash noted. No erythema.   Psychiatric: She has a normal mood and affect. Her speech is normal and behavior is normal.   Nursing note and vitals reviewed.      Procedures           ED Course      Re-examined patient several times in ED. Pt resting, no distress. Discussed results and tx plan. No acute findings on CT scan of abdomen. Pt states she has never had an EGD. She does not take daily antiacids. Will dc home on Pepcid, Carafate and Zofran. Discussed close f/u with PCP and GI.     Reviewed old records.     Recent Results (from the past 24 hour(s))   Comprehensive Metabolic Panel    Collection Time: 07/24/20  3:01 PM   Result Value Ref Range    Glucose 109 (H) 65 - 99 mg/dL    BUN 10 6 - 20 mg/dL    Creatinine 0.78 0.57 - 1.00 mg/dL    Sodium 136 136 - 145 mmol/L    Potassium 4.1 3.5 - 5.2 mmol/L    Chloride 101 98 - 107 mmol/L    CO2 24.0 22.0 - 29.0 mmol/L    Calcium 9.8 8.6 - 10.5 mg/dL    Total Protein 7.3 6.0 - 8.5 g/dL    Albumin 4.50 3.50 - 5.20 g/dL    ALT (SGPT) 20 1 - 33 U/L    AST (SGOT) 16 1 - 32 U/L    Alkaline  Phosphatase 45 39 - 117 U/L    Total Bilirubin 0.4 0.0 - 1.2 mg/dL    eGFR Non African Amer 80 >60 mL/min/1.73    Globulin 2.8 gm/dL    A/G Ratio 1.6 g/dL    BUN/Creatinine Ratio 12.8 7.0 - 25.0    Anion Gap 11.0 5.0 - 15.0 mmol/L   Lipase    Collection Time: 07/24/20  3:01 PM   Result Value Ref Range    Lipase 25 13 - 60 U/L   Troponin    Collection Time: 07/24/20  3:01 PM   Result Value Ref Range    Troponin T <0.010 0.000 - 0.030 ng/mL   Light Blue Top    Collection Time: 07/24/20  3:01 PM   Result Value Ref Range    Extra Tube hold for add-on    Green Top (Gel)    Collection Time: 07/24/20  3:01 PM   Result Value Ref Range    Extra Tube Hold for add-ons.    Lavender Top    Collection Time: 07/24/20  3:01 PM   Result Value Ref Range    Extra Tube hold for add-on    Gold Top - SST    Collection Time: 07/24/20  3:01 PM   Result Value Ref Range    Extra Tube Hold for add-ons.    CBC Auto Differential    Collection Time: 07/24/20  3:01 PM   Result Value Ref Range    WBC 9.25 3.40 - 10.80 10*3/mm3    RBC 4.37 3.77 - 5.28 10*6/mm3    Hemoglobin 13.8 12.0 - 15.9 g/dL    Hematocrit 40.4 34.0 - 46.6 %    MCV 92.4 79.0 - 97.0 fL    MCH 31.6 26.6 - 33.0 pg    MCHC 34.2 31.5 - 35.7 g/dL    RDW 12.9 12.3 - 15.4 %    RDW-SD 43.6 37.0 - 54.0 fl    MPV 8.9 6.0 - 12.0 fL    Platelets 328 140 - 450 10*3/mm3    Neutrophil % 60.6 42.7 - 76.0 %    Lymphocyte % 31.7 19.6 - 45.3 %    Monocyte % 5.2 5.0 - 12.0 %    Eosinophil % 1.6 0.3 - 6.2 %    Basophil % 0.5 0.0 - 1.5 %    Immature Grans % 0.4 0.0 - 0.5 %    Neutrophils, Absolute 5.60 1.70 - 7.00 10*3/mm3    Lymphocytes, Absolute 2.93 0.70 - 3.10 10*3/mm3    Monocytes, Absolute 0.48 0.10 - 0.90 10*3/mm3    Eosinophils, Absolute 0.15 0.00 - 0.40 10*3/mm3    Basophils, Absolute 0.05 0.00 - 0.20 10*3/mm3    Immature Grans, Absolute 0.04 0.00 - 0.05 10*3/mm3    nRBC 0.0 0.0 - 0.2 /100 WBC   Lactic Acid, Plasma    Collection Time: 07/24/20  3:02 PM   Result Value Ref Range    Lactate 1.3  0.5 - 2.0 mmol/L   Urinalysis With Microscopic If Indicated (No Culture) - Urine, Clean Catch    Collection Time: 07/24/20  3:15 PM   Result Value Ref Range    Color, UA Yellow Yellow, Straw    Appearance, UA Cloudy (A) Clear    pH, UA 7.5 5.0 - 8.0    Specific Gravity, UA 1.009 1.001 - 1.030    Glucose, UA Negative Negative    Ketones, UA Negative Negative    Bilirubin, UA Negative Negative    Blood, UA Trace (A) Negative    Protein, UA Negative Negative    Leuk Esterase, UA Negative Negative    Nitrite, UA Negative Negative    Urobilinogen, UA 0.2 E.U./dL 0.2 - 1.0 E.U./dL   Urinalysis, Microscopic Only - Urine, Clean Catch    Collection Time: 07/24/20  3:15 PM   Result Value Ref Range    RBC, UA 3-6 (A) None Seen, 0-2 /HPF    WBC, UA 0-2 None Seen, 0-2 /HPF    Bacteria, UA None Seen None Seen, Trace /HPF    Squamous Epithelial Cells, UA 0-2 None Seen, 0-2 /HPF    Hyaline Casts, UA 0-6 0 - 6 /LPF    Methodology Automated Microscopy      Note: In addition to lab results from this visit, the labs listed above may include labs taken at another facility or during a different encounter within the last 24 hours. Please correlate lab times with ED admission and discharge times for further clarification of the services performed during this visit.    CT Abdomen Pelvis With Contrast   Final Result      1. No acute abdominal or pelvic findings.   2. Normal appendix.   3. Uncomplicated sigmoid colonic diverticulosis.   4. Cholecystectomy and hysterectomy.   5. Stable 2 cm noncalcified pulmonary nodule in the left lower lobe, unchanged since 6/5/2019. Continued documentation of 2 years stability per Fleischner criteria recommended.               Signer Name: Brandin Linares MD    Signed: 7/24/2020 7:27 PM    Workstation Name: [email or web address]     [facility]      XR Chest 1 View   Final Result   1. Patient's known 2 cm left lower lobe nodule is faintly visible   superimposed on the left heart shadow.   2.  Stable chest exam with mild chronic appearing interstitial changes   elsewhere. No clearly new chest disease.       D:  07/24/2020   E:  07/24/2020            This report was finalized on 7/24/2020 10:47 PM by Dr. Jerry Guerrero MD.            Vitals:    07/24/20 2030 07/24/20 2031 07/24/20 2056 07/24/20 2130   BP: 109/70   103/58   BP Location:       Patient Position:       Pulse:  93 95 80   Resp:       Temp:       TempSrc:       SpO2:  90% 95% 95%   Weight:       Height:         Medications   sodium chloride 0.9 % flush 10 mL (has no administration in time range)   sodium chloride 0.9 % flush 10 mL (has no administration in time range)   Morphine sulfate (PF) injection 4 mg (4 mg Intravenous Given 7/24/20 1606)   ondansetron (ZOFRAN) injection 4 mg (4 mg Intravenous Given 7/24/20 1605)   famotidine (PEPCID) injection 20 mg (20 mg Intravenous Given 7/24/20 1606)   HYDROmorphone (DILAUDID) injection 0.5 mg (0.5 mg Intravenous Given 7/24/20 1702)   iopamidol (ISOVUE-300) 61 % injection 100 mL (100 mL Intravenous Given 7/24/20 1740)   HYDROmorphone (DILAUDID) injection 0.5 mg (0.5 mg Intravenous Given 7/24/20 1901)   aluminum-magnesium hydroxide-simethicone (MAALOX MAX) 400-400-40 MG/5ML suspension 15 mL (15 mL Oral Given 7/24/20 2055)   Lidocaine Viscous HCl (XYLOCAINE) 2 % mouth solution 15 mL (15 mL Mouth/Throat Given 7/24/20 2056)     ECG/EMG Results (last 24 hours)     Procedure Component Value Units Date/Time    ECG 12 Lead [[id number]] Collected:  07/24/20 1501     Updated:  07/24/20 1514    Narrative:       Test Reason : Upper Abdominal Pain Triage Protocol  Blood Pressure : **/** mmHG  Vent. Rate : 112 BPM     Atrial Rate : 112 BPM     P-R Int : 130 ms          QRS Dur : 076 ms      QT Int : 314 ms       P-R-T Axes : 031 072 104 degrees     QTc Int : 428 ms    Sinus tachycardia  Cannot rule out Anterior infarct , age undetermined  Abnormal ECG  When compared with ECG of 09-DEC-2019 00:39,  Vent. rate has  increased BY  65 BPM  Nonspecific T wave abnormality now evident in Lateral leads  Confirmed by RODNEY INFANTE MD ([id number]) on 7/24/2020 3:13:54 PM    Referred By:  DENIS           Confirmed By:RODNEY INFANTE MD        ECG 12 Lead   Final Result   Test Reason : Upper Abdominal Pain Triage Protocol   Blood Pressure : **/** mmHG   Vent. Rate : 112 BPM     Atrial Rate : 112 BPM      P-R Int : 130 ms          QRS Dur : 076 ms       QT Int : 314 ms       P-R-T Axes : 031 072 104 degrees      QTc Int : 428 ms      Sinus tachycardia   Cannot rule out Anterior infarct , age undetermined   Abnormal ECG   When compared with ECG of 09-DEC-2019 00:39,   Vent. rate has increased BY  65 BPM   Nonspecific T wave abnormality now evident in Lateral leads   Confirmed by RODNEY INFANTE MD ([id number]) on 7/24/2020 3:13:54 PM      Referred By:  DENIS           Confirmed By:RODNEY INFANTE MD            COVID-19 RISK SCREEN     1. Has the patient had close contact without PPE with a lab confirmed COVID-19 (+) person or a person under investigation (PUI) for COVID-19 infection?  -- No     2. Has the patient had respiratory symptoms, worsened/new cough and/or SOA, unexplained fever, or sudden loss of smell and/or taste in the past 7 days? --  No    3. Does the patient have baseline higher exposure risk such as working in healthcare field, currently residing in healthcare facility, or ongoing hemodialysis?  --  Yes         DISCHARGE    Patient discharged in stable condition.    Reviewed implications of results, diagnosis, meds, responsibility to follow up, warning signs and symptoms of possible worsening, potential complications and reasons to return to ER.    Patient/Family voiced understanding of above instructions.    Discussed plan for discharge, as there is no emergent indication for admission.  Pt/family is agreeable and understands need for follow up and possible repeat testing.  Pt/family is aware that discharge does not mean that nothing is wrong  but that it indicates no emergency is currently present that requires admission and they must continue care with follow-up as given below or with a physician of their choice.     FOLLOW-UP  Anastacia Jose MD  [address] [facility] [address]  [place] [id number] [phone]    Schedule an appointment as soon as possible for a visit       Sera Castillo PA  [address] [facility] [address]  [place] [id number]  [phone]    Schedule an appointment as soon as possible for a visit       [facility] Emergency Department  [address] [phone]    If symptoms worsen         Medication List      New Prescriptions    sucralfate 1 g tablet  Commonly known as:  CARAFATE  Take 1 tablet by mouth 4 (Four) Times a Day for 10 days.        Changed    famotidine 40 MG tablet  Commonly known as:  PEPCID  Take 1 tablet by mouth Daily for 30 days.  What changed:    medication strength  how much to take  when to take this                                            MDM    Final diagnoses:   Epigastric pain            Rachel Pemberton PA  07/24/20 6618

## 2020-07-28 ENCOUNTER — OFFICE VISIT (OUTPATIENT)
Dept: GASTROENTEROLOGY | Facility: CLINIC | Age: 45
End: 2020-07-28

## 2020-07-28 ENCOUNTER — LAB (OUTPATIENT)
Dept: LAB | Facility: HOSPITAL | Age: 45
End: 2020-07-28

## 2020-07-28 VITALS
BODY MASS INDEX: 32.2 KG/M2 | HEART RATE: 73 BPM | WEIGHT: 175 LBS | DIASTOLIC BLOOD PRESSURE: 76 MMHG | SYSTOLIC BLOOD PRESSURE: 124 MMHG | TEMPERATURE: 97.3 F | OXYGEN SATURATION: 99 % | HEIGHT: 62 IN

## 2020-07-28 DIAGNOSIS — Z72.0 TOBACCO ABUSE: ICD-10-CM

## 2020-07-28 DIAGNOSIS — R10.13 EPIGASTRIC PAIN: ICD-10-CM

## 2020-07-28 DIAGNOSIS — R11.0 NAUSEA: ICD-10-CM

## 2020-07-28 DIAGNOSIS — R14.0 BLOATING: ICD-10-CM

## 2020-07-28 DIAGNOSIS — R10.13 EPIGASTRIC PAIN: Primary | ICD-10-CM

## 2020-07-28 PROCEDURE — 99214 OFFICE O/P EST MOD 30 MIN: CPT | Performed by: INTERNAL MEDICINE

## 2020-07-28 PROCEDURE — 83516 IMMUNOASSAY NONANTIBODY: CPT

## 2020-07-28 PROCEDURE — 36415 COLL VENOUS BLD VENIPUNCTURE: CPT

## 2020-07-28 PROCEDURE — 82784 ASSAY IGA/IGD/IGG/IGM EACH: CPT

## 2020-07-28 NOTE — PROGRESS NOTES
PCP: Anastacia Jose MD    Chief Complaint   Patient presents with   • HOSPITAL F/U   • Abdominal Pain     PAIN IS ONGOING AND MEDICATION THAT WAS RX AT ED IS NOT HELPING   • Nausea     SUBSIDED   • Vomiting     SUBSIDED   • Constipation     DENIES ANY BLEEDING DURING BM        History of Present Illness:   HPI  Mrs. Freeman is a 44-year-old with a history of hypertension, lung nodule and chronic tobacco use.  She presents for evaluation of upper center abdominal pain.  The patient was emergency department just over week ago and was given a prescription for Pepcid and sucralfate. The medication has not given her any relief.  She states the recent episode of abdominal pain began just about 10 days ago.  Patient describes a constant sharp pain.  Mrs. Freeman denies that the pain is necessarily worse after a meal.  There is no radiation to the flanks or into the back.  Mrs. Freeman admits to nausea without vomiting.  The patient has not been aware of any dionna blood in the stool.  She generally has a bowel movement  every other day that can be hard.  There have been some periods of loose stool without nocturnal diarrhea.  Mrs. Freeman admits to having bloating without a sense of early satiety.  There is no history of unexplained weight loss.  Mrs. Freeman denies any night sweats, fever or chills.  She has undergone previous colonoscopy on several occasions by Dr. Badillo at [facility].  There is no reported history of colon polyps but she does have diverticulosis.    She has a history of smoking for greater than 30 years.  Patient denies any alcohol intake.  No known family history of liver disease.  Patient denies any history of Crohn's disease or ulcerative colitis.  She is unaware of any history of celiac sprue.    Past Medical History:   Diagnosis Date   • Anxiety    • Depression    • Diverticulitis    • Eczema    • GERD (gastroesophageal reflux disease)    • Headache    • Hypertension    • Lung nodule    •  Tobacco abuse        Past Surgical History:   Procedure Laterality Date   • BRONCHOSCOPY N/A 7/17/2019    Procedure: DARIN, RADIAL EBUS WITH FLUORO BRONCH;  Surgeon: Nikhil Knutson MD;  Location: [facility] ENDOSCOPY;  Service: Pulmonary   • CHOLECYSTECTOMY  1991   • COLONOSCOPY  2016, 2017   • ENDOMETRIAL ABLATION W/ NOVASURE  2010    WITH LAP BTL   • VAGINAL HYSTERECTOMY  2010    Partial          Current Outpatient Medications:   •  aspirin-acetaminophen-caffeine (EXCEDRIN MIGRAINE) 250-250-65 MG per tablet, Take 1 tablet by mouth Every 6 (Six) Hours As Needed for Headache., Disp: , Rfl:   •  desvenlafaxine (PRISTIQ) 50 MG 24 hr tablet, Take 50 mg by mouth Daily., Disp: , Rfl:   •  famotidine (PEPCID) 40 MG tablet, Take 1 tablet by mouth Daily for 30 days., Disp: 30 tablet, Rfl: 0  •  HYDROcodone-acetaminophen (NORCO) 5-325 MG per tablet, Take 1 tablet by mouth Every 6 (Six) Hours As Needed for Moderate Pain ., Disp: 12 tablet, Rfl: 0  •  lactobacillus acidophilus (RISAQUAD) capsule capsule, Take 1 capsule by mouth Daily., Disp: 30 capsule, Rfl: 0  •  lisinopril-hydrochlorothiazide (PRINZIDE,ZESTORETIC) 20-12.5 MG per tablet, Take 1 tablet by mouth Daily., Disp: , Rfl:   •  sucralfate (CARAFATE) 1 g tablet, Take 1 tablet by mouth 4 (Four) Times a Day for 10 days., Disp: 40 tablet, Rfl: 0  •  tiZANidine (ZANAFLEX) 4 MG tablet, Take 1 tablet by mouth Every 8 (Eight) Hours As Needed for Muscle Spasms., Disp: 21 tablet, Rfl: 0    Allergies   Allergen Reactions   • Macrobid [Nitrofurantoin Macrocrystal] Hives and Shortness Of Breath       Family History   Problem Relation Age of Onset   • Breast cancer Paternal Aunt         40's   • Cancer Mother    • Diabetes Father    • Diabetes Brother    • Cancer Maternal Aunt    • Ovarian cancer Neg Hx        Social History     Socioeconomic History   • Marital status:      Spouse name: Not on file   • Number of children: 1   • Years of education: Not on file   • Highest  education level: Not on file   Occupational History   • Occupation: [employer]   Tobacco Use   • Smoking status: Current Every Day Smoker     Packs/day: 0.25     Years: 30.00     Pack years: 7.50     Types: Cigarettes   • Smokeless tobacco: Never Used   • Tobacco comment: 5 a day    Substance and Sexual Activity   • Alcohol use: Yes     Alcohol/week: 1.0 standard drinks     Types: 1 Cans of beer per week     Comment: socially   • Drug use: No   • Sexual activity: Defer   Social History Narrative    Lives in [place].        Review of Systems   Constitutional: Negative.  Negative for appetite change, fatigue, fever and unexpected weight change.   HENT: Negative.  Negative for dental problem, mouth sores, postnasal drip, sneezing, trouble swallowing and voice change.    Eyes: Negative.  Negative for pain, redness and itching.   Respiratory: Negative.  Negative for cough, shortness of breath and wheezing.    Cardiovascular: Negative.  Negative for chest pain, palpitations and leg swelling.   Gastrointestinal: Positive for abdominal pain, constipation, nausea and vomiting. Negative for abdominal distention, anal bleeding, blood in stool, diarrhea and rectal pain.   Endocrine: Negative.  Negative for cold intolerance, heat intolerance, polydipsia and polyuria.   Genitourinary: Negative.  Negative for dysuria, enuresis, flank pain, hematuria and urgency.   Musculoskeletal: Negative.  Negative for arthralgias, back pain, joint swelling and myalgias.   Skin: Negative.  Negative for color change, pallor and rash.   Allergic/Immunologic: Negative.  Negative for environmental allergies, food allergies and immunocompromised state.   Neurological: Negative.  Negative for dizziness, tremors, seizures, facial asymmetry, numbness and headaches.   Hematological: Negative.    Psychiatric/Behavioral: Negative.  Negative for behavioral problems, dysphoric mood, hallucinations and self-injury.       Vitals:    07/28/20 1455    BP: 124/76   Pulse: 73   Temp: 97.3 °F (36.3 °C)   SpO2: 99%       Physical Exam   Constitutional: She is oriented to person, place, and time. She appears well-nourished. No distress.   HENT:   Head: Atraumatic.   Mouth/Throat: Oropharynx is clear and moist. No oropharyngeal exudate.   Eyes: EOM are normal. No scleral icterus.   Neck: Neck supple. No thyromegaly present.   Cardiovascular: Normal rate, regular rhythm and normal heart sounds. Exam reveals no gallop.   No murmur heard.  Pulmonary/Chest: Effort normal and breath sounds normal. She has no wheezes. She has no rales.   Abdominal: Soft. Bowel sounds are normal. There is tenderness (epigastrium). There is no rebound and no guarding.   Musculoskeletal: Normal range of motion. She exhibits no edema.   Lymphadenopathy:     She has no cervical adenopathy.   Neurological: She is alert and oriented to person, place, and time. She exhibits normal muscle tone.   Skin: Skin is dry. No erythema.   Psychiatric: She has a normal mood and affect. Her behavior is normal. Thought content normal.   Vitals reviewed.      Mishel was seen today for hospital f/u, abdominal pain, nausea, vomiting and constipation.    Diagnoses and all orders for this visit:    Epigastric pain  -     Esophagogastroduodenoscopy; Future  -     Celiac Ab tTG DGP TIgA; Future    Nausea  -     Esophagogastroduodenoscopy; Future  -     Celiac Ab tTG DGP TIgA; Future    Bloating  -     Esophagogastroduodenoscopy; Future  -     Celiac Ab tTG DGP TIgA; Future    Tobacco abuse    Differential diagnosis includes Helicobacter pylori gastropathy, celiac sprue, lymphocytic gastropathy, gastroparesis and functional dyspepsia.  CT scan from last week was unremarkable for any gross pancreatic pathology and the patient has undergone previous cholecystectomy. I reviewed the lab data and the patient has normal transaminases, alk phos and bilirubin.      Plan: Will proceed with an EGD for further evaluation.            Will check celiac panel.           Encouraged tobacco cessation.

## 2020-07-29 LAB
BACTERIA SPEC AEROBE CULT: NORMAL
BACTERIA SPEC AEROBE CULT: NORMAL
GLIADIN PEPTIDE IGA SER-ACNC: 5 UNITS (ref 0–19)
GLIADIN PEPTIDE IGG SER-ACNC: 2 UNITS (ref 0–19)
IGA SERPL-MCNC: 183 MG/DL (ref 87–352)
TTG IGA SER-ACNC: <2 U/ML (ref 0–3)
TTG IGG SER-ACNC: <2 U/ML (ref 0–5)

## 2020-07-30 ENCOUNTER — TELEPHONE (OUTPATIENT)
Dept: GASTROENTEROLOGY | Facility: CLINIC | Age: 45
End: 2020-07-30

## 2020-07-30 NOTE — TELEPHONE ENCOUNTER
----- Message from Elder Godwin MD sent at 7/29/2020  6:12 PM EDT -----  Let Ms. Freeman know the celiac blood test was negative.  Thank you,  ALEJANDRO

## 2020-08-09 ENCOUNTER — APPOINTMENT (OUTPATIENT)
Dept: PREADMISSION TESTING | Facility: HOSPITAL | Age: 45
End: 2020-08-09

## 2020-08-09 PROCEDURE — C9803 HOPD COVID-19 SPEC COLLECT: HCPCS

## 2020-08-09 PROCEDURE — U0002 COVID-19 LAB TEST NON-CDC: HCPCS

## 2020-08-09 PROCEDURE — U0004 COV-19 TEST NON-CDC HGH THRU: HCPCS

## 2020-08-10 LAB
REF LAB TEST METHOD: NORMAL
SARS-COV-2 RNA RESP QL NAA+PROBE: NOT DETECTED

## 2020-08-12 ENCOUNTER — OUTSIDE FACILITY SERVICE (OUTPATIENT)
Dept: GASTROENTEROLOGY | Facility: CLINIC | Age: 45
End: 2020-08-12

## 2020-08-12 ENCOUNTER — LAB REQUISITION (OUTPATIENT)
Dept: LAB | Facility: HOSPITAL | Age: 45
End: 2020-08-12

## 2020-08-12 DIAGNOSIS — R11.0 NAUSEA: ICD-10-CM

## 2020-08-12 DIAGNOSIS — K30 FUNCTIONAL DYSPEPSIA: ICD-10-CM

## 2020-08-12 DIAGNOSIS — R10.13 EPIGASTRIC PAIN: ICD-10-CM

## 2020-08-12 PROCEDURE — 43239 EGD BIOPSY SINGLE/MULTIPLE: CPT | Performed by: INTERNAL MEDICINE

## 2020-08-12 PROCEDURE — 88305 TISSUE EXAM BY PATHOLOGIST: CPT | Performed by: INTERNAL MEDICINE

## 2020-08-17 ENCOUNTER — TELEPHONE (OUTPATIENT)
Dept: GASTROENTEROLOGY | Facility: CLINIC | Age: 45
End: 2020-08-17

## 2020-08-17 DIAGNOSIS — R11.0 NAUSEA: Primary | ICD-10-CM

## 2020-08-17 DIAGNOSIS — R10.13 DYSPEPSIA: ICD-10-CM

## 2020-08-17 DIAGNOSIS — R10.13 EPIGASTRIC PAIN: ICD-10-CM

## 2020-08-17 NOTE — TELEPHONE ENCOUNTER
----- Message from Elder Godwin MD sent at 8/16/2020  8:43 PM EDT -----  Let Ms. Freeman know there was no H. Pylori. No evidence for celiac sprue. Consider gastric emptying study.  Thank you,  ALEJANDRO

## 2020-08-17 NOTE — TELEPHONE ENCOUNTER
Dr Godwin,  I spoke with Ms Durapurva and gave biopsy results. She would like to move forward with GES. Thanks

## 2020-09-25 ENCOUNTER — HOSPITAL ENCOUNTER (OUTPATIENT)
Dept: NUCLEAR MEDICINE | Facility: HOSPITAL | Age: 45
End: 2020-09-25

## 2020-10-20 ENCOUNTER — HOSPITAL ENCOUNTER (OUTPATIENT)
Dept: NUCLEAR MEDICINE | Facility: HOSPITAL | Age: 45
Discharge: HOME | End: 2020-10-20

## 2020-10-20 DIAGNOSIS — R10.13 DYSPEPSIA: ICD-10-CM

## 2020-10-20 DIAGNOSIS — R10.13 EPIGASTRIC PAIN: ICD-10-CM

## 2020-10-20 DIAGNOSIS — R11.0 NAUSEA: ICD-10-CM

## 2020-10-20 PROCEDURE — 78264 GASTRIC EMPTYING IMG STUDY: CPT

## 2020-10-20 PROCEDURE — A9541 TC99M SULFUR COLLOID: HCPCS | Performed by: INTERNAL MEDICINE

## 2020-10-20 PROCEDURE — 0 TECHNETIUM SULFUR COLLOID: Performed by: INTERNAL MEDICINE

## 2020-10-20 RX ADMIN — TECHNETIUM TC 99M SULFUR COLLOID 1 DOSE: KIT at 10:00

## 2020-10-27 ENCOUNTER — TELEPHONE (OUTPATIENT)
Dept: GASTROENTEROLOGY | Facility: CLINIC | Age: 45
End: 2020-10-27

## 2020-10-27 NOTE — TELEPHONE ENCOUNTER
I called Ms Freeman back. GES results given. Patient complains of abdominal pain under breast area & abdominal swelling. On scale 1-10 its 8.I explained to patient that Dr Godwin is out of the office until next week.  I advised patient to go to the ER to be evaluated. Patient voiced understanding.

## 2021-03-31 ENCOUNTER — TRANSCRIBE ORDERS (OUTPATIENT)
Dept: ADMINISTRATIVE | Facility: HOSPITAL | Age: 46
End: 2021-03-31

## 2021-03-31 ENCOUNTER — HOSPITAL ENCOUNTER (OUTPATIENT)
Dept: CT IMAGING | Facility: HOSPITAL | Age: 46
Discharge: HOME | End: 2021-03-31
Admitting: PHYSICIAN ASSISTANT

## 2021-03-31 DIAGNOSIS — R10.32 LEFT LOWER QUADRANT PAIN: ICD-10-CM

## 2021-03-31 DIAGNOSIS — R10.32 LEFT LOWER QUADRANT PAIN: Primary | ICD-10-CM

## 2021-03-31 PROCEDURE — 74177 CT ABD & PELVIS W/CONTRAST: CPT

## 2021-03-31 PROCEDURE — 25010000002 IOPAMIDOL 61 % SOLUTION: Performed by: PHYSICIAN ASSISTANT

## 2021-03-31 RX ADMIN — IOPAMIDOL 95 ML: 612 INJECTION, SOLUTION INTRAVENOUS at 11:28

## 2021-04-02 ENCOUNTER — APPOINTMENT (OUTPATIENT)
Dept: CT IMAGING | Facility: HOSPITAL | Age: 46
End: 2021-04-02

## 2021-04-02 ENCOUNTER — HOSPITAL ENCOUNTER (EMERGENCY)
Facility: HOSPITAL | Age: 46
Discharge: HOME | End: 2021-04-02
Attending: EMERGENCY MEDICINE | Admitting: EMERGENCY MEDICINE

## 2021-04-02 VITALS
RESPIRATION RATE: 20 BRPM | HEIGHT: 62 IN | TEMPERATURE: 98.4 F | WEIGHT: 174 LBS | SYSTOLIC BLOOD PRESSURE: 101 MMHG | OXYGEN SATURATION: 94 % | HEART RATE: 105 BPM | BODY MASS INDEX: 32.02 KG/M2 | DIASTOLIC BLOOD PRESSURE: 70 MMHG
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
## 2021-04-02 DIAGNOSIS — R51.9 SEVERE HEADACHE: ICD-10-CM

## 2021-04-02 DIAGNOSIS — S00.03XA CONTUSION OF PARIETAL REGION OF SCALP, INITIAL ENCOUNTER: ICD-10-CM

## 2021-04-02 PROCEDURE — 25010000002 KETOROLAC TROMETHAMINE PER 15 MG: Performed by: EMERGENCY MEDICINE

## 2021-04-02 PROCEDURE — 70450 CT HEAD/BRAIN W/O DYE: CPT

## 2021-04-02 PROCEDURE — 99283 EMERGENCY DEPT VISIT LOW MDM: CPT

## 2021-04-02 PROCEDURE — 96372 THER/PROPH/DIAG INJ SC/IM: CPT

## 2021-04-02 RX ORDER — KETOROLAC TROMETHAMINE 15 MG/ML
15 INJECTION, SOLUTION INTRAMUSCULAR; INTRAVENOUS ONCE
Status: COMPLETED | OUTPATIENT
Start: 2021-04-02 | End: 2021-04-02

## 2021-04-02 RX ORDER — ACETAMINOPHEN 500 MG
1000 TABLET ORAL EVERY 6 HOURS PRN
Qty: 30 TABLET | Refills: 0 | Status: SHIPPED | OUTPATIENT
Start: 2021-04-02

## 2021-04-02 RX ORDER — VILAZODONE HYDROCHLORIDE 10 MG/1
10 TABLET ORAL DAILY
COMMUNITY

## 2021-04-02 RX ORDER — ACETAMINOPHEN 500 MG
1000 TABLET ORAL ONCE
Status: COMPLETED | OUTPATIENT
Start: 2021-04-02 | End: 2021-04-02

## 2021-04-02 RX ORDER — KETOROLAC TROMETHAMINE 10 MG/1
10 TABLET, FILM COATED ORAL EVERY 6 HOURS PRN
Qty: 20 TABLET | Refills: 0 | Status: ON HOLD | OUTPATIENT
Start: 2021-04-02 | End: 2022-10-19 | Stop reason: HOSPADM

## 2021-04-02 RX ADMIN — KETOROLAC TROMETHAMINE 15 MG: 15 INJECTION, SOLUTION INTRAMUSCULAR; INTRAVENOUS at 10:07

## 2021-04-02 RX ADMIN — ACETAMINOPHEN 1000 MG: 500 TABLET, FILM COATED ORAL at 10:06

## 2021-04-02 NOTE — ED PROVIDER NOTES
Subjective   Patient presents to the emergency department with injury secondary to fall.  Patient reports she is at her home last night when she slipped and fell landing on her back.  She hit her head on a hardwood floor.  She presents today complaining of of hematoma with swelling and severe headache in the left occipital region.  She denies any loss of consciousness.  She has no neck or back pain.  She denies being on any blood thinners.  No other acute injuries or acute complaints reported at this time.  Patient denies any vision changes.  No fluid drainage from the ears or nose.      History provided by:  Patient      Review of Systems   Constitutional: Negative.    Respiratory: Negative.    Cardiovascular: Negative.    Gastrointestinal: Negative.    Musculoskeletal: Negative.    Skin: Positive for wound.   Neurological: Positive for headaches.   Psychiatric/Behavioral: The patient is nervous/anxious.    All other systems reviewed and are negative.      Past Medical History:   Diagnosis Date   • Anxiety    • Depression    • Diverticulitis    • Eczema    • GERD (gastroesophageal reflux disease)    • Headache    • Hypertension    • Lung nodule    • Tobacco abuse        Allergies   Allergen Reactions   • Macrobid [Nitrofurantoin Macrocrystal] Hives and Shortness Of Breath       Past Surgical History:   Procedure Laterality Date   • BRONCHOSCOPY N/A 7/17/2019    Procedure: DARIN, RADIAL EBUS WITH FLUORO BRONCH;  Surgeon: Nikhil Knutson MD;  Location: [facility] ENDOSCOPY;  Service: Pulmonary   • CHOLECYSTECTOMY  1991   • COLONOSCOPY  2016, 2017   • ENDOMETRIAL ABLATION W/ NOVASURE  2010    WITH LAP BTL   • VAGINAL HYSTERECTOMY  2010    Partial        Family History   Problem Relation Age of Onset   • Breast cancer Paternal Aunt         40's   • Cancer Mother    • Diabetes Father    • Diabetes Brother    • Cancer Maternal Aunt    • Ovarian cancer Neg Hx        Social History     Socioeconomic History   • Marital  status:      Spouse name: Not on file   • Number of children: 1   • Years of education: Not on file   • Highest education level: Not on file   Tobacco Use   • Smoking status: Current Every Day Smoker     Packs/day: 0.25     Years: 30.00     Pack years: 7.50     Types: Cigarettes   • Smokeless tobacco: Never Used   • Tobacco comment: 5 a day    Substance and Sexual Activity   • Alcohol use: Yes     Alcohol/week: 1.0 standard drinks     Types: 1 Cans of beer per week     Comment: socially   • Drug use: No   • Sexual activity: Defer           Objective   Physical Exam  Vitals and nursing note reviewed.   Constitutional:       Appearance: Normal appearance. She is obese.      Comments: Patient is anxious and tearful.  She appears uncomfortable.   HENT:      Head: Normocephalic. Contusion present. No raccoon eyes or Camejo's sign.      Jaw: No trismus or malocclusion.        Right Ear: External ear normal.      Left Ear: External ear normal.      Nose: Nose normal. No rhinorrhea.      Mouth/Throat:      Mouth: Mucous membranes are moist.   Eyes:      Extraocular Movements: Extraocular movements intact.      Pupils: Pupils are equal, round, and reactive to light.   Cardiovascular:      Rate and Rhythm: Regular rhythm. Tachycardia present.      Pulses: Normal pulses.   Pulmonary:      Effort: Pulmonary effort is normal. No respiratory distress.      Breath sounds: Normal breath sounds.   Abdominal:      Palpations: Abdomen is soft.      Tenderness: There is no abdominal tenderness.   Musculoskeletal:         General: Normal range of motion.      Cervical back: Normal range of motion and neck supple. No tenderness or bony tenderness.      Thoracic back: No bony tenderness.      Lumbar back: No bony tenderness.      Comments: C, T, L-spine without any focal tenderness to palpation or step-off.   Skin:     General: Skin is warm and dry.   Neurological:      General: No focal deficit present.      Mental Status: She is  alert and oriented to person, place, and time.   Psychiatric:         Attention and Perception: Attention normal.         Mood and Affect: Mood is anxious. Affect is tearful.         Speech: Speech normal.         Behavior: Behavior normal.         Thought Content: Thought content normal.         Procedures           ED Course  ED Course as of Apr 02 1209 Fri Apr 02, 2021   0858 CT scan of the head ordered secondary to fall with head injury with external signs of trauma and severe headache.    [RS]   0952 Personally reviewed the CT scan of the head as well as report from radiology that demonstrates some soft tissue contusion/hematoma but no other acute findings.  No fractures or intracranial hemorrhage.  See report for radiology for details.   CT Head Without Contrast [RS]   0952 Patient with findings of fall with minor head injury with contusion and hematoma.  Will discharge with symptomatic management I had a discussion with the patient/family regarding diagnosis, diagnostic results, treatment plan, and medications.  The patient/family indicated understanding of these instructions.  I spent adequate time at the bedside proceeding discharge necessary to personally discuss the aftercare instructions, giving patient education, providing explanations of the results of our evaluations/findings, and my decision making to assure that the patient/family understand the plan of care.  Time was allotted to answer questions at that time and throughout the ED course.  Emphasis was placed on timely follow-up after discharge.  I also discussed the potential for the development of an acute emergent condition requiring further evaluation, admission, or even surgical intervention. I discussed that we found nothing during the visit today indicating the need for further workup, admission, or the presence of an unstable medical condition.  I encouraged the patient to return to the emergency department immediately for ANY concerns,  worsening, new complaints, or if symptoms persist and unable to seek follow-up in a timely fashion.  The patient/family expressed understanding and agreement with this plan.     [RS]      ED Course User Index  [RS] Luis Richardson MD                                           MDM  Number of Diagnoses or Management Options  Contusion of parietal region of scalp, initial encounter  Fall, initial encounter  Severe headache  Diagnosis management comments: No results found for this or any previous visit (from the past 24 hour(s)).  Note: In addition to lab results from this visit, the labs listed above may include labs taken at another facility or during a different encounter within the last 24 hours. Please correlate lab times with ED admission and discharge times for further clarification of the services performed during this visit.    CT Head Without Contrast   Preliminary Result    1. Left parietal scalp hematoma. No evidence of acute trauma to the    brain.    2. No other evidence of acute intracranial disease is seen.         D:  04/02/2021    E:  04/02/2021                    ------------------------------------------------------------               04/02/21 04/02/21 04/02/21 04/02/21                  0852          0900      0930      1000     ------------------------------------------------------------   BP:          121/86        118/77    105/68    101/70     BP Location:    Left arm                                      Patient Position:     Sitting                                      Pulse:         105                                        Resp:          20                                         Temp:   98.4 °F (36.9 °C)                                 TempSrc:      Oral                                        SpO2:          97%          97%       96%       94%       Weight: 78.9 kg (174 lb)                                   "Height:  157.5 cm (62\")                                  ------------------------------------------------------------  Medications  acetaminophen (TYLENOL) tablet 1,000 mg (1,000 mg Oral Given 4/2/21 1006)  ketorolac (TORADOL) injection 15 mg (15 mg Intramuscular Given 4/2/21 1007)  ECG/EMG Results (last 24 hours)     ** No results found for the last 24 hours. **      No orders to display         Amount and/or Complexity of Data Reviewed  Tests in the radiology section of CPT®: reviewed  Independent visualization of images, tracings, or specimens: yes        Final diagnoses:   Fall, initial encounter   Contusion of parietal region of scalp, initial encounter   Severe headache       ED Disposition  ED Disposition     ED Disposition Condition Comment    Discharge Stable           Anastacia Jose MD  [facility] [address]  [phone]    In 3 days  If not better/resolved.    [facility]  [address] [facility] [address] [phone]    As needed, If symptoms worsen or ANY concerns.         Medication List      New Prescriptions    acetaminophen 500 MG tablet  Commonly known as: TYLENOL  Take 2 tablets by mouth Every 6 (Six) Hours As Needed for Mild Pain  or Moderate Pain .     ketorolac 10 MG tablet  Commonly known as: TORADOL  Take 1 tablet by mouth Every 6 (Six) Hours As Needed for Moderate Pain . Received a dose in the ER.        Stop    HYDROcodone-acetaminophen 5-325 MG per tablet  Commonly known as: NORCO     lactobacillus acidophilus capsule capsule     tiZANidine 4 MG tablet  Commonly known as: ZANAFLEX           Where to Get Your Medications      [facility] [address] [facility] [place], [place] - [address] - [phone]  - [phone]   [address] [place] [id number]    Phone: [phone]   · acetaminophen 500 MG tablet  · ketorolac 10 MG tablet        "   Luis Richardson MD  04/02/21 0376

## 2021-07-28 ENCOUNTER — TRANSCRIBE ORDERS (OUTPATIENT)
Dept: ADMINISTRATIVE | Facility: HOSPITAL | Age: 46
End: 2021-07-28

## 2021-07-28 DIAGNOSIS — Z12.31 VISIT FOR SCREENING MAMMOGRAM: Primary | ICD-10-CM

## 2021-09-03 ENCOUNTER — APPOINTMENT (OUTPATIENT)
Dept: MAMMOGRAPHY | Facility: HOSPITAL | Age: 46
End: 2021-09-03

## 2021-09-27 ENCOUNTER — HOSPITAL ENCOUNTER (OUTPATIENT)
Dept: MAMMOGRAPHY | Facility: HOSPITAL | Age: 46
Discharge: HOME | End: 2021-09-27
Admitting: INTERNAL MEDICINE

## 2021-09-27 DIAGNOSIS — Z12.31 VISIT FOR SCREENING MAMMOGRAM: ICD-10-CM

## 2021-09-27 PROCEDURE — 77067 SCR MAMMO BI INCL CAD: CPT | Performed by: RADIOLOGY

## 2021-09-27 PROCEDURE — 77063 BREAST TOMOSYNTHESIS BI: CPT | Performed by: RADIOLOGY

## 2021-09-27 PROCEDURE — 77063 BREAST TOMOSYNTHESIS BI: CPT

## 2021-09-27 PROCEDURE — 77067 SCR MAMMO BI INCL CAD: CPT

## 2021-09-30 ENCOUNTER — APPOINTMENT (OUTPATIENT)
Dept: MAMMOGRAPHY | Facility: HOSPITAL | Age: 46
End: 2021-09-30

## 2022-06-16 ENCOUNTER — HOSPITAL ENCOUNTER (EMERGENCY)
Facility: HOSPITAL | Age: 47
Discharge: HOME | End: 2022-06-16
Attending: EMERGENCY MEDICINE | Admitting: EMERGENCY MEDICINE

## 2022-06-16 ENCOUNTER — APPOINTMENT (OUTPATIENT)
Dept: GENERAL RADIOLOGY | Facility: HOSPITAL | Age: 47
End: 2022-06-16

## 2022-06-16 VITALS
HEIGHT: 62 IN | HEART RATE: 68 BPM | TEMPERATURE: 98.2 F | BODY MASS INDEX: 33.13 KG/M2 | WEIGHT: 180 LBS | DIASTOLIC BLOOD PRESSURE: 74 MMHG | RESPIRATION RATE: 18 BRPM | OXYGEN SATURATION: 95 % | SYSTOLIC BLOOD PRESSURE: 118 MMHG

## 2022-06-16 DIAGNOSIS — J06.9 VIRAL URI WITH COUGH: Primary | ICD-10-CM

## 2022-06-16 LAB
ALBUMIN SERPL-MCNC: 4.1 G/DL (ref 3.5–5.2)
ALBUMIN/GLOB SERPL: 1.5 G/DL
ALP SERPL-CCNC: 59 U/L (ref 39–117)
ALT SERPL W P-5'-P-CCNC: 21 U/L (ref 1–33)
ANION GAP SERPL CALCULATED.3IONS-SCNC: 9 MMOL/L (ref 5–15)
AST SERPL-CCNC: 15 U/L (ref 1–32)
B PARAPERT DNA SPEC QL NAA+PROBE: NOT DETECTED
B PERT DNA SPEC QL NAA+PROBE: NOT DETECTED
BASOPHILS # BLD AUTO: 0.05 10*3/MM3 (ref 0–0.2)
BASOPHILS NFR BLD AUTO: 0.7 % (ref 0–1.5)
BILIRUB SERPL-MCNC: 0.2 MG/DL (ref 0–1.2)
BUN SERPL-MCNC: 13 MG/DL (ref 6–20)
BUN/CREAT SERPL: 19.7 (ref 7–25)
C PNEUM DNA NPH QL NAA+NON-PROBE: NOT DETECTED
CALCIUM SPEC-SCNC: 9.7 MG/DL (ref 8.6–10.5)
CHLORIDE SERPL-SCNC: 108 MMOL/L (ref 98–107)
CO2 SERPL-SCNC: 25 MMOL/L (ref 22–29)
CREAT SERPL-MCNC: 0.66 MG/DL (ref 0.57–1)
DEPRECATED RDW RBC AUTO: 44.3 FL (ref 37–54)
EGFRCR SERPLBLD CKD-EPI 2021: 109 ML/MIN/1.73
EOSINOPHIL # BLD AUTO: 0.16 10*3/MM3 (ref 0–0.4)
EOSINOPHIL NFR BLD AUTO: 2.2 % (ref 0.3–6.2)
ERYTHROCYTE [DISTWIDTH] IN BLOOD BY AUTOMATED COUNT: 13 % (ref 12.3–15.4)
FLUAV SUBTYP SPEC NAA+PROBE: NOT DETECTED
FLUAV SUBTYP SPEC NAA+PROBE: NOT DETECTED
FLUBV RNA ISLT QL NAA+PROBE: NOT DETECTED
FLUBV RNA ISLT QL NAA+PROBE: NOT DETECTED
GLOBULIN UR ELPH-MCNC: 2.7 GM/DL
GLUCOSE SERPL-MCNC: 95 MG/DL (ref 65–99)
HADV DNA SPEC NAA+PROBE: NOT DETECTED
HCOV 229E RNA SPEC QL NAA+PROBE: NOT DETECTED
HCOV HKU1 RNA SPEC QL NAA+PROBE: NOT DETECTED
HCOV NL63 RNA SPEC QL NAA+PROBE: NOT DETECTED
HCOV OC43 RNA SPEC QL NAA+PROBE: NOT DETECTED
HCT VFR BLD AUTO: 35.7 % (ref 34–46.6)
HGB BLD-MCNC: 12 G/DL (ref 12–15.9)
HMPV RNA NPH QL NAA+NON-PROBE: NOT DETECTED
HOLD SPECIMEN: NORMAL
HPIV1 RNA ISLT QL NAA+PROBE: NOT DETECTED
HPIV2 RNA SPEC QL NAA+PROBE: NOT DETECTED
HPIV3 RNA NPH QL NAA+PROBE: NOT DETECTED
HPIV4 P GENE NPH QL NAA+PROBE: NOT DETECTED
IMM GRANULOCYTES # BLD AUTO: 0.03 10*3/MM3 (ref 0–0.05)
IMM GRANULOCYTES NFR BLD AUTO: 0.4 % (ref 0–0.5)
LIPASE SERPL-CCNC: 60 U/L (ref 13–60)
LYMPHOCYTES # BLD AUTO: 2.94 10*3/MM3 (ref 0.7–3.1)
LYMPHOCYTES NFR BLD AUTO: 39.5 % (ref 19.6–45.3)
M PNEUMO IGG SER IA-ACNC: NOT DETECTED
MCH RBC QN AUTO: 31.3 PG (ref 26.6–33)
MCHC RBC AUTO-ENTMCNC: 33.6 G/DL (ref 31.5–35.7)
MCV RBC AUTO: 93 FL (ref 79–97)
MONOCYTES # BLD AUTO: 0.55 10*3/MM3 (ref 0.1–0.9)
MONOCYTES NFR BLD AUTO: 7.4 % (ref 5–12)
NEUTROPHILS NFR BLD AUTO: 3.71 10*3/MM3 (ref 1.7–7)
NEUTROPHILS NFR BLD AUTO: 49.8 % (ref 42.7–76)
NRBC BLD AUTO-RTO: 0 /100 WBC (ref 0–0.2)
NT-PROBNP SERPL-MCNC: 44.6 PG/ML (ref 0–450)
PLATELET # BLD AUTO: 280 10*3/MM3 (ref 140–450)
PMV BLD AUTO: 9.1 FL (ref 6–12)
POTASSIUM SERPL-SCNC: 4.6 MMOL/L (ref 3.5–5.2)
PROT SERPL-MCNC: 6.8 G/DL (ref 6–8.5)
QT INTERVAL: 348 MS
QTC INTERVAL: 414 MS
RBC # BLD AUTO: 3.84 10*6/MM3 (ref 3.77–5.28)
RHINOVIRUS RNA SPEC NAA+PROBE: NOT DETECTED
RSV RNA NPH QL NAA+NON-PROBE: NOT DETECTED
S PYO AG THROAT QL: NEGATIVE
SARS-COV-2 RNA NPH QL NAA+NON-PROBE: NOT DETECTED
SARS-COV-2 RNA PNL SPEC NAA+PROBE: NOT DETECTED
SODIUM SERPL-SCNC: 142 MMOL/L (ref 136–145)
TROPONIN T SERPL-MCNC: <0.01 NG/ML (ref 0–0.03)
WBC NRBC COR # BLD: 7.44 10*3/MM3 (ref 3.4–10.8)
WHOLE BLOOD HOLD COAG: NORMAL
WHOLE BLOOD HOLD SPECIMEN: NORMAL

## 2022-06-16 PROCEDURE — 93005 ELECTROCARDIOGRAM TRACING: CPT

## 2022-06-16 PROCEDURE — 84484 ASSAY OF TROPONIN QUANT: CPT

## 2022-06-16 PROCEDURE — C9803 HOPD COVID-19 SPEC COLLECT: HCPCS | Performed by: EMERGENCY MEDICINE

## 2022-06-16 PROCEDURE — 0202U NFCT DS 22 TRGT SARS-COV-2: CPT | Performed by: NURSE PRACTITIONER

## 2022-06-16 PROCEDURE — 80053 COMPREHEN METABOLIC PANEL: CPT

## 2022-06-16 PROCEDURE — 83690 ASSAY OF LIPASE: CPT

## 2022-06-16 PROCEDURE — 87081 CULTURE SCREEN ONLY: CPT | Performed by: NURSE PRACTITIONER

## 2022-06-16 PROCEDURE — 99284 EMERGENCY DEPT VISIT MOD MDM: CPT

## 2022-06-16 PROCEDURE — 87880 STREP A ASSAY W/OPTIC: CPT | Performed by: NURSE PRACTITIONER

## 2022-06-16 PROCEDURE — 36415 COLL VENOUS BLD VENIPUNCTURE: CPT

## 2022-06-16 PROCEDURE — 85025 COMPLETE CBC W/AUTO DIFF WBC: CPT

## 2022-06-16 PROCEDURE — 99283 EMERGENCY DEPT VISIT LOW MDM: CPT

## 2022-06-16 PROCEDURE — 83880 ASSAY OF NATRIURETIC PEPTIDE: CPT

## 2022-06-16 PROCEDURE — 87636 SARSCOV2 & INF A&B AMP PRB: CPT | Performed by: EMERGENCY MEDICINE

## 2022-06-16 PROCEDURE — 71045 X-RAY EXAM CHEST 1 VIEW: CPT

## 2022-06-16 RX ORDER — ASPIRIN 81 MG/1
324 TABLET, CHEWABLE ORAL ONCE
Status: DISCONTINUED | OUTPATIENT
Start: 2022-06-16 | End: 2022-06-16 | Stop reason: HOSPADM

## 2022-06-16 RX ORDER — SODIUM CHLORIDE 0.9 % (FLUSH) 0.9 %
10 SYRINGE (ML) INJECTION AS NEEDED
Status: DISCONTINUED | OUTPATIENT
Start: 2022-06-16 | End: 2022-06-16 | Stop reason: HOSPADM

## 2022-06-16 RX ADMIN — SODIUM CHLORIDE 1000 ML: 9 INJECTION, SOLUTION INTRAVENOUS at 14:25

## 2022-06-16 NOTE — ED PROVIDER NOTES
" EMERGENCY DEPARTMENT ENCOUNTER    Pt Name: Mishel Freeman  MRN: [id number]  Pt :   1975  Room Number:    Date of encounter:  2022  PCP: Huan Lock MD  ED Provider: PATY Ballesteros    Historian: patient      HPI:  Chief Complaint: URI        Context: Mishel Freeman is a 46 y.o. female who presents to the ED discomfort in the nose with burning with inability to taste or smell things normally.  Patient states her symptoms began 8 days ago.  She has seen her primary care provider who is given her a 7-day course of antibiotics and prior to that, a 5-day course of steroids which has not been helpful.  Patient states she has had no improvement with Flonase as well.  Her initial symptoms were moderate sore throat now resolved.  Positive for \"bad cough\" without rhinorrhea.  Patient is concerned that she is not improving    Review of systems is negative for fever or chills.  Positive for cough and shortness of breath without chest pain.  GI and  systems are negative.  Positive for generalized weakness without dizziness or syncope.  No neurosensory complaints or focal weakness.      PAST MEDICAL HISTORY  Past Medical History:   Diagnosis Date   • Anxiety    • Depression    • Diverticulitis    • Eczema    • GERD (gastroesophageal reflux disease)    • Headache    • Hypertension    • Lung nodule    • Tobacco abuse          PAST SURGICAL HISTORY  Past Surgical History:   Procedure Laterality Date   • BRONCHOSCOPY N/A 2019    Procedure: DARIN, RADIAL EBUS WITH FLUORO BRONCH;  Surgeon: Nikhil Knutson MD;  Location: [facility] ENDOSCOPY;  Service: Pulmonary   • CHOLECYSTECTOMY     • COLONOSCOPY  ,    • ENDOMETRIAL ABLATION W/ NOVASURE      WITH LAP BTL   • VAGINAL HYSTERECTOMY      Partial          FAMILY HISTORY  Family History   Problem Relation Age of Onset   • Breast cancer Paternal Aunt         40's   • Cancer Mother    • Diabetes Father    • " Diabetes Brother    • Cancer Maternal Aunt    • Ovarian cancer Neg Hx          SOCIAL HISTORY  Social History     Socioeconomic History   • Marital status:    • Number of children: 1   Tobacco Use   • Smoking status: Current Every Day Smoker     Packs/day: 0.25     Years: 30.00     Pack years: 7.50     Types: Cigarettes   • Smokeless tobacco: Never Used   • Tobacco comment: 5 a day    Substance and Sexual Activity   • Alcohol use: Yes     Alcohol/week: 1.0 standard drink     Types: 1 Cans of beer per week     Comment: socially   • Drug use: No   • Sexual activity: Defer         ALLERGIES  Macrobid [nitrofurantoin macrocrystal]        REVIEW OF SYSTEMS  Review of Systems     All systems reviewed and negative except for those discussed in HPI.       PHYSICAL EXAM    I have reviewed the triage vital signs and nursing notes.    ED Triage Vitals [06/16/22 1022]   Temp Heart Rate Resp BP SpO2   98.2 °F (36.8 °C) 83 18 113/96 99 %      Temp src Heart Rate Source Patient Position BP Location FiO2 (%)   Oral Monitor Sitting Left arm --       Physical Exam  GENERAL:   Appears in no acute distress.  Her vital signs are normal.  HENT: Nares patent.  Posterior pharynx is benign.  Neck:.  Supple.  No lymphadenopathy  EYES: No scleral icterus.  CV: Regular rhythm, regular rate.  No tachycardia.  No peripheral edema  RESPIRATORY: Normal effort.  No audible wheezes, rales or rhonchi.  ABDOMEN: Soft, nontender  MUSCULOSKELETAL: No deformities.   NEURO: Alert, moves all extremities, follows commands.  No neurosensory deficits or focal weakness.  No photophobia or meningeal signs  SKIN: Warm, dry, no rash visualized.        LAB RESULTS  Recent Results (from the past 24 hour(s))   ECG 12 Lead    Collection Time: 06/16/22 10:24 AM   Result Value Ref Range    QT Interval 348 ms    QTC Interval 414 ms   Troponin    Collection Time: 06/16/22 10:32 AM    Specimen: Blood   Result Value Ref Range    Troponin T <0.010 0.000 - 0.030 ng/mL    Comprehensive Metabolic Panel    Collection Time: 06/16/22 10:32 AM    Specimen: Blood   Result Value Ref Range    Glucose 95 65 - 99 mg/dL    BUN 13 6 - 20 mg/dL    Creatinine 0.66 0.57 - 1.00 mg/dL    Sodium 142 136 - 145 mmol/L    Potassium 4.6 3.5 - 5.2 mmol/L    Chloride 108 (H) 98 - 107 mmol/L    CO2 25.0 22.0 - 29.0 mmol/L    Calcium 9.7 8.6 - 10.5 mg/dL    Total Protein 6.8 6.0 - 8.5 g/dL    Albumin 4.10 3.50 - 5.20 g/dL    ALT (SGPT) 21 1 - 33 U/L    AST (SGOT) 15 1 - 32 U/L    Alkaline Phosphatase 59 39 - 117 U/L    Total Bilirubin 0.2 0.0 - 1.2 mg/dL    Globulin 2.7 gm/dL    A/G Ratio 1.5 g/dL    BUN/Creatinine Ratio 19.7 7.0 - 25.0    Anion Gap 9.0 5.0 - 15.0 mmol/L    eGFR 109.0 >60.0 mL/min/1.73   Lipase    Collection Time: 06/16/22 10:32 AM    Specimen: Blood   Result Value Ref Range    Lipase 60 13 - 60 U/L   BNP    Collection Time: 06/16/22 10:32 AM    Specimen: Blood   Result Value Ref Range    proBNP 44.6 0.0 - 450.0 pg/mL   Green Top (Gel)    Collection Time: 06/16/22 10:32 AM   Result Value Ref Range    Extra Tube Hold for add-ons.    Lavender Top    Collection Time: 06/16/22 10:32 AM   Result Value Ref Range    Extra Tube hold for add-on    Gold Top - SST    Collection Time: 06/16/22 10:32 AM   Result Value Ref Range    Extra Tube Hold for add-ons.    Gray Top    Collection Time: 06/16/22 10:32 AM   Result Value Ref Range    Extra Tube Hold for add-ons.    Light Blue Top    Collection Time: 06/16/22 10:32 AM   Result Value Ref Range    Extra Tube Hold for add-ons.    CBC Auto Differential    Collection Time: 06/16/22 10:32 AM    Specimen: Blood   Result Value Ref Range    WBC 7.44 3.40 - 10.80 10*3/mm3    RBC 3.84 3.77 - 5.28 10*6/mm3    Hemoglobin 12.0 12.0 - 15.9 g/dL    Hematocrit 35.7 34.0 - 46.6 %    MCV 93.0 79.0 - 97.0 fL    MCH 31.3 26.6 - 33.0 pg    MCHC 33.6 31.5 - 35.7 g/dL    RDW 13.0 12.3 - 15.4 %    RDW-SD 44.3 37.0 - 54.0 fl    MPV 9.1 6.0 - 12.0 fL    Platelets 280 140 - 450  10*3/mm3    Neutrophil % 49.8 42.7 - 76.0 %    Lymphocyte % 39.5 19.6 - 45.3 %    Monocyte % 7.4 5.0 - 12.0 %    Eosinophil % 2.2 0.3 - 6.2 %    Basophil % 0.7 0.0 - 1.5 %    Immature Grans % 0.4 0.0 - 0.5 %    Neutrophils, Absolute 3.71 1.70 - 7.00 10*3/mm3    Lymphocytes, Absolute 2.94 0.70 - 3.10 10*3/mm3    Monocytes, Absolute 0.55 0.10 - 0.90 10*3/mm3    Eosinophils, Absolute 0.16 0.00 - 0.40 10*3/mm3    Basophils, Absolute 0.05 0.00 - 0.20 10*3/mm3    Immature Grans, Absolute 0.03 0.00 - 0.05 10*3/mm3    nRBC 0.0 0.0 - 0.2 /100 WBC   COVID-19 and FLU A/B PCR - Swab, Nasopharynx    Collection Time: 06/16/22 10:33 AM    Specimen: Nasopharynx; Swab   Result Value Ref Range    COVID19 Not Detected Not Detected - Ref. Range    Influenza A PCR Not Detected Not Detected    Influenza B PCR Not Detected Not Detected   Respiratory Panel PCR w/COVID-19(SARS-CoV-2) EMIR/NEEL/GABY/PAD/COR/MAD/LEANDRA In-House, NP Swab in [facility], 3-4 HR TAT - Swab, Nasopharynx    Collection Time: 06/16/22 10:33 AM    Specimen: Nasopharynx; Swab   Result Value Ref Range    ADENOVIRUS, PCR Not Detected Not Detected    Coronavirus 229E Not Detected Not Detected    Coronavirus HKU1 Not Detected Not Detected    Coronavirus NL63 Not Detected Not Detected    Coronavirus OC43 Not Detected Not Detected    COVID19 Not Detected Not Detected - Ref. Range    Human Metapneumovirus Not Detected Not Detected    Human Rhinovirus/Enterovirus Not Detected Not Detected    Influenza A PCR Not Detected Not Detected    Influenza B PCR Not Detected Not Detected    Parainfluenza Virus 1 Not Detected Not Detected    Parainfluenza Virus 2 Not Detected Not Detected    Parainfluenza Virus 3 Not Detected Not Detected    Parainfluenza Virus 4 Not Detected Not Detected    RSV, PCR Not Detected Not Detected    Bordetella pertussis pcr Not Detected Not Detected    Bordetella parapertussis PCR Not Detected Not Detected    Chlamydophila pneumoniae PCR Not Detected Not Detected     Mycoplasma pneumo by PCR Not Detected Not Detected   Rapid Strep A Screen - Swab, Throat    Collection Time: 06/16/22  2:25 PM    Specimen: Throat; Swab   Result Value Ref Range    Strep A Ag Negative Negative       If labs were ordered, I independently reviewed the results.        RADIOLOGY  XR Chest 1 View    Result Date: 6/16/2022   DATE OF EXAM: 6/16/2022 11:28 AM  PROCEDURE: XR CHEST 1 VW-  INDICATIONS: Chest Pain triage protocol  COMPARISON: 07/24/2020  TECHNIQUE: Portable chest radiograph.  FINDINGS:  The cardiomediastinal silhouette is within normal limits. The lungs are clear. There is no pneumothorax, focal consolidation, or large pleural effusion. Osseous structures grossly intact.      No acute process.  This report was finalized on 6/16/2022 11:41 AM by Sriram Gallagher MD.        PROCEDURES    Procedures    ECG 12 Lead   Final Result   Test Reason : chest pain   Blood Pressure :   */*   mmHG   Vent. Rate :  85 BPM     Atrial Rate :  85 BPM      P-R Int : 146 ms          QRS Dur :  80 ms       QT Int : 348 ms       P-R-T Axes :  54  60  47 degrees      QTc Int : 414 ms      Sinus rhythm with premature atrial complexes with aberrant conduction   Cannot rule out Anterior infarct (cited on or before 24-JUL-2020)   Abnormal ECG   When compared with ECG of 24-JUL-2020 15:01,   aberrant conduction is now present   Nonspecific T wave abnormality no longer evident in Lateral leads   Confirmed by KIERRA CARVER MD ([id number]) on 6/16/2022 8:17:42 PM      Referred By:            Confirmed By: KIERRA CARVER MD          MEDICATIONS GIVEN IN ER    Medications   sodium chloride 0.9 % bolus 1,000 mL (0 mL Intravenous Stopped 6/16/22 1537)           ED Course as of 06/16/22 2330   Thu Jun 16, 2022   1527 Patient's work-up is very reassuring.  Her oxygen saturation is 100% on room air.  She has not detection of any viral infection on respiratory panel including COVID.  We discussed parameters for concern that would warrant  return to the emergency department.  Patient's greatest complaint is nasal discomfort.  She has been using an over-the-counter preparation and side of her nose.  We will give her Bactroban.   [MS]      ED Course User Index  [MS] Loulou Weaver APRN           AS OF 23:30 EDT VITALS:    BP - 118/74  HR - 68  TEMP - 98.2 °F (36.8 °C) (Oral)  O2 SATS - 95%                  DIAGNOSIS  Final diagnoses:   Viral URI with cough         DISPOSITION    DISCHARGE    Patient discharged in stable condition.    Reviewed implications of results, diagnosis, meds, responsibility to follow up, warning signs and symptoms of possible worsening, potential complications and reasons to return to ER.    Patient/Family voiced understanding of above instructions.    Discussed plan for discharge, as there is no emergent indication for admission.  Pt/family is agreeable and understands need for follow up and possible repeat testing.  Pt/family is aware that discharge does not mean that nothing is wrong but that it indicates no emergency is currently present that requires admission and they must continue care with follow-up as given below or with a physician of their choice.     FOLLOW-UP  Huan Lock MD  [address] [address]  [phone]    Schedule an appointment as soon as possible for a visit in 2 days  If symptoms worsen         Medication List      New Prescriptions    mupirocin 2 % ointment  Commonly known as: BACTROBAN  Apply 1 application topically to the appropriate area as directed 3 (Three) Times a Day.           Where to Get Your Medications      These medications were sent to [address] - [address] [place] [address] - [phone]  - [phone]   [address] [address]    Phone: [phone]   · mupirocin 2 % ointment                  Loulou Weaver APRN  06/16/22 2208

## 2022-06-16 NOTE — DISCHARGE INSTRUCTIONS
Home to rest.  The pharmacy has a prescription ointment for you to place daily into your nostrils.  Follow-up with your primary care provider to monitor your recovery.  Thank you

## 2022-06-18 LAB — BACTERIA SPEC AEROBE CULT: NORMAL

## 2022-10-18 ENCOUNTER — APPOINTMENT (OUTPATIENT)
Dept: ULTRASOUND IMAGING | Facility: HOSPITAL | Age: 47
End: 2022-10-18

## 2022-10-18 ENCOUNTER — APPOINTMENT (OUTPATIENT)
Dept: CT IMAGING | Facility: HOSPITAL | Age: 47
End: 2022-10-18

## 2022-10-18 ENCOUNTER — APPOINTMENT (OUTPATIENT)
Dept: GENERAL RADIOLOGY | Facility: HOSPITAL | Age: 47
End: 2022-10-18

## 2022-10-18 ENCOUNTER — APPOINTMENT (OUTPATIENT)
Dept: CARDIOLOGY | Facility: HOSPITAL | Age: 47
End: 2022-10-18

## 2022-10-18 ENCOUNTER — HOSPITAL ENCOUNTER (INPATIENT)
Facility: HOSPITAL | Age: 47
LOS: 2 days | Discharge: HOME | End: 2022-10-20
Attending: EMERGENCY MEDICINE | Admitting: INTERNAL MEDICINE

## 2022-10-18 DIAGNOSIS — R65.21 SEPTIC SHOCK: Primary | ICD-10-CM

## 2022-10-18 DIAGNOSIS — J18.9 PNEUMONIA OF RIGHT UPPER LOBE DUE TO INFECTIOUS ORGANISM: ICD-10-CM

## 2022-10-18 DIAGNOSIS — A41.9 SEPTIC SHOCK: Primary | ICD-10-CM

## 2022-10-18 PROBLEM — K21.9 GERD (GASTROESOPHAGEAL REFLUX DISEASE): Chronic | Status: ACTIVE | Noted: 2022-10-18

## 2022-10-18 PROBLEM — E66.9 CLASS 1 OBESITY IN ADULT: Chronic | Status: ACTIVE | Noted: 2022-10-18

## 2022-10-18 PROBLEM — Z86.19 HISTORY OF CLOSTRIDIUM DIFFICILE INFECTION: Status: ACTIVE | Noted: 2022-10-18

## 2022-10-18 PROBLEM — Z72.0 TOBACCO USE: Chronic | Status: ACTIVE | Noted: 2022-10-18

## 2022-10-18 PROBLEM — E66.9 CLASS 1 OBESITY IN ADULT: Status: ACTIVE | Noted: 2022-10-18

## 2022-10-18 PROBLEM — K21.9 GERD (GASTROESOPHAGEAL REFLUX DISEASE): Status: ACTIVE | Noted: 2022-10-18

## 2022-10-18 PROBLEM — Z72.0 TOBACCO USE: Status: ACTIVE | Noted: 2022-10-18

## 2022-10-18 LAB
ALBUMIN SERPL-MCNC: 4.4 G/DL (ref 3.5–5.2)
ALBUMIN/GLOB SERPL: 1.5 G/DL
ALP SERPL-CCNC: 73 U/L (ref 39–117)
ALT SERPL W P-5'-P-CCNC: 27 U/L (ref 1–33)
AMPHET+METHAMPHET UR QL: NEGATIVE
AMPHETAMINES UR QL: NEGATIVE
AMYLASE SERPL-CCNC: 63 U/L (ref 28–100)
ANION GAP SERPL CALCULATED.3IONS-SCNC: 15 MMOL/L (ref 5–15)
AST SERPL-CCNC: 23 U/L (ref 1–32)
BACTERIA UR QL AUTO: ABNORMAL /HPF
BARBITURATES UR QL SCN: NEGATIVE
BASOPHILS # BLD AUTO: 0.04 10*3/MM3 (ref 0–0.2)
BASOPHILS NFR BLD AUTO: 0.2 % (ref 0–1.5)
BENZODIAZ UR QL SCN: NEGATIVE
BH CV ECHO MEAS - AO MAX PG: 13.2 MMHG
BH CV ECHO MEAS - AO MEAN PG: 7.5 MMHG
BH CV ECHO MEAS - AO ROOT DIAM: 3.2 CM
BH CV ECHO MEAS - AO V2 MAX: 181.9 CM/SEC
BH CV ECHO MEAS - AO V2 VTI: 28.7 CM
BH CV ECHO MEAS - AVA(I,D): 1.87 CM2
BH CV ECHO MEAS - EDV(CUBED): 118.8 ML
BH CV ECHO MEAS - EDV(MOD-SP2): 56 ML
BH CV ECHO MEAS - EDV(MOD-SP4): 56.6 ML
BH CV ECHO MEAS - EF(MOD-BP): 56.2 %
BH CV ECHO MEAS - EF(MOD-SP2): 52.1 %
BH CV ECHO MEAS - EF(MOD-SP4): 61.5 %
BH CV ECHO MEAS - ESV(CUBED): 45.3 ML
BH CV ECHO MEAS - ESV(MOD-SP2): 26.8 ML
BH CV ECHO MEAS - ESV(MOD-SP4): 21.8 ML
BH CV ECHO MEAS - FS: 27.5 %
BH CV ECHO MEAS - IVS/LVPW: 0.99 CM
BH CV ECHO MEAS - IVSD: 1.17 CM
BH CV ECHO MEAS - LA DIMENSION: 3 CM
BH CV ECHO MEAS - LAT PEAK E' VEL: 12.6 CM/SEC
BH CV ECHO MEAS - LV MASS(C)D: 221.7 GRAMS
BH CV ECHO MEAS - LV MAX PG: 7.1 MMHG
BH CV ECHO MEAS - LV MEAN PG: 4.1 MMHG
BH CV ECHO MEAS - LV V1 MAX: 133.2 CM/SEC
BH CV ECHO MEAS - LV V1 VTI: 18 CM
BH CV ECHO MEAS - LVIDD: 4.9 CM
BH CV ECHO MEAS - LVIDS: 3.6 CM
BH CV ECHO MEAS - LVOT AREA: 3 CM2
BH CV ECHO MEAS - LVOT DIAM: 1.95 CM
BH CV ECHO MEAS - LVPWD: 1.18 CM
BH CV ECHO MEAS - MED PEAK E' VEL: 7.4 CM/SEC
BH CV ECHO MEAS - MV A MAX VEL: 99.4 CM/SEC
BH CV ECHO MEAS - MV DEC SLOPE: 907.5 CM/SEC2
BH CV ECHO MEAS - MV DEC TIME: 0.14 MSEC
BH CV ECHO MEAS - MV E MAX VEL: 111 CM/SEC
BH CV ECHO MEAS - MV E/A: 1.12
BH CV ECHO MEAS - MV P1/2T: 46.6 MSEC
BH CV ECHO MEAS - MVA(P1/2T): 4.7 CM2
BH CV ECHO MEAS - PA ACC TIME: 0.11 SEC
BH CV ECHO MEAS - PA PR(ACCEL): 30.3 MMHG
BH CV ECHO MEAS - PA V2 MAX: 117.3 CM/SEC
BH CV ECHO MEAS - RAP SYSTOLE: 3 MMHG
BH CV ECHO MEAS - RVSP: 11 MMHG
BH CV ECHO MEAS - SV(LVOT): 53.6 ML
BH CV ECHO MEAS - SV(MOD-SP2): 29.2 ML
BH CV ECHO MEAS - SV(MOD-SP4): 34.8 ML
BH CV ECHO MEAS - TAPSE (>1.6): 2.04 CM
BH CV ECHO MEAS - TR MAX PG: 8 MMHG
BH CV ECHO MEAS - TR MAX VEL: 133.7 CM/SEC
BH CV ECHO MEASUREMENTS AVERAGE E/E' RATIO: 11.1
BH CV XLRA - RV BASE: 3 CM
BH CV XLRA - RV LENGTH: 6.9 CM
BH CV XLRA - RV MID: 2.46 CM
BH CV XLRA - TDI S': 15.3 CM/SEC
BILIRUB SERPL-MCNC: 0.4 MG/DL (ref 0–1.2)
BILIRUB UR QL STRIP: NEGATIVE
BUN SERPL-MCNC: 12 MG/DL (ref 6–20)
BUN/CREAT SERPL: 14.1 (ref 7–25)
BUPRENORPHINE SERPL-MCNC: NEGATIVE NG/ML
CALCIUM SPEC-SCNC: 9.8 MG/DL (ref 8.6–10.5)
CANNABINOIDS SERPL QL: NEGATIVE
CHLORIDE SERPL-SCNC: 100 MMOL/L (ref 98–107)
CLARITY UR: CLEAR
CO2 SERPL-SCNC: 20 MMOL/L (ref 22–29)
COCAINE UR QL: NEGATIVE
COLOR UR: YELLOW
CORTIS SERPL-MCNC: 31.45 MCG/DL
CREAT SERPL-MCNC: 0.85 MG/DL (ref 0.57–1)
D-LACTATE SERPL-SCNC: 1.8 MMOL/L (ref 0.5–2)
D-LACTATE SERPL-SCNC: 3.7 MMOL/L (ref 0.5–2)
DEPRECATED RDW RBC AUTO: 44.3 FL (ref 37–54)
EGFRCR SERPLBLD CKD-EPI 2021: 84.6 ML/MIN/1.73
EOSINOPHIL # BLD AUTO: 0.06 10*3/MM3 (ref 0–0.4)
EOSINOPHIL NFR BLD AUTO: 0.3 % (ref 0.3–6.2)
ERYTHROCYTE [DISTWIDTH] IN BLOOD BY AUTOMATED COUNT: 13.2 % (ref 12.3–15.4)
ETHANOL BLD-MCNC: <10 MG/DL (ref 0–10)
FLUAV RNA RESP QL NAA+PROBE: NOT DETECTED
FLUBV RNA RESP QL NAA+PROBE: NOT DETECTED
GLOBULIN UR ELPH-MCNC: 2.9 GM/DL
GLUCOSE SERPL-MCNC: 91 MG/DL (ref 65–99)
GLUCOSE UR STRIP-MCNC: NEGATIVE MG/DL
HCT VFR BLD AUTO: 35.9 % (ref 34–46.6)
HGB BLD-MCNC: 12 G/DL (ref 12–15.9)
HGB UR QL STRIP.AUTO: ABNORMAL
HOLD SPECIMEN: NORMAL
HYALINE CASTS UR QL AUTO: ABNORMAL /LPF
IMM GRANULOCYTES # BLD AUTO: 0.11 10*3/MM3 (ref 0–0.05)
IMM GRANULOCYTES NFR BLD AUTO: 0.6 % (ref 0–0.5)
KETONES UR QL STRIP: NEGATIVE
LEFT ATRIUM VOLUME INDEX: 20.3 ML/M2
LEUKOCYTE ESTERASE UR QL STRIP.AUTO: NEGATIVE
LIPASE SERPL-CCNC: 32 U/L (ref 13–60)
LYMPHOCYTES # BLD AUTO: 1.37 10*3/MM3 (ref 0.7–3.1)
LYMPHOCYTES NFR BLD AUTO: 7 % (ref 19.6–45.3)
MAXIMAL PREDICTED HEART RATE: 172 BPM
MCH RBC QN AUTO: 30.8 PG (ref 26.6–33)
MCHC RBC AUTO-ENTMCNC: 33.4 G/DL (ref 31.5–35.7)
MCV RBC AUTO: 92.3 FL (ref 79–97)
METHADONE UR QL SCN: NEGATIVE
MONOCYTES # BLD AUTO: 0.77 10*3/MM3 (ref 0.1–0.9)
MONOCYTES NFR BLD AUTO: 3.9 % (ref 5–12)
MUCOUS THREADS URNS QL MICRO: ABNORMAL /HPF
NEUTROPHILS NFR BLD AUTO: 17.33 10*3/MM3 (ref 1.7–7)
NEUTROPHILS NFR BLD AUTO: 88 % (ref 42.7–76)
NITRITE UR QL STRIP: NEGATIVE
NRBC BLD AUTO-RTO: 0 /100 WBC (ref 0–0.2)
OPIATES UR QL: NEGATIVE
OXYCODONE UR QL SCN: NEGATIVE
PCP UR QL SCN: NEGATIVE
PH UR STRIP.AUTO: 5.5 [PH] (ref 5–8)
PLATELET # BLD AUTO: 324 10*3/MM3 (ref 140–450)
PMV BLD AUTO: 8.9 FL (ref 6–12)
POTASSIUM SERPL-SCNC: 4.4 MMOL/L (ref 3.5–5.2)
PROCALCITONIN SERPL-MCNC: 0.05 NG/ML (ref 0–0.25)
PROPOXYPH UR QL: NEGATIVE
PROT SERPL-MCNC: 7.3 G/DL (ref 6–8.5)
PROT UR QL STRIP: NEGATIVE
QT INTERVAL: 292 MS
QTC INTERVAL: 427 MS
RBC # BLD AUTO: 3.89 10*6/MM3 (ref 3.77–5.28)
RBC # UR STRIP: ABNORMAL /HPF
REF LAB TEST METHOD: ABNORMAL
SARS-COV-2 RNA RESP QL NAA+PROBE: NOT DETECTED
SODIUM SERPL-SCNC: 135 MMOL/L (ref 136–145)
SP GR UR STRIP: >=1.03 (ref 1–1.03)
SQUAMOUS #/AREA URNS HPF: ABNORMAL /HPF
STRESS TARGET HR: 146 BPM
TRICYCLICS UR QL SCN: POSITIVE
TROPONIN T SERPL-MCNC: <0.01 NG/ML (ref 0–0.03)
TSH SERPL DL<=0.05 MIU/L-ACNC: 1.65 UIU/ML (ref 0.27–4.2)
UROBILINOGEN UR QL STRIP: ABNORMAL
WBC # UR STRIP: ABNORMAL /HPF
WBC NRBC COR # BLD: 19.68 10*3/MM3 (ref 3.4–10.8)
WHOLE BLOOD HOLD COAG: NORMAL
WHOLE BLOOD HOLD SPECIMEN: NORMAL

## 2022-10-18 PROCEDURE — 84484 ASSAY OF TROPONIN QUANT: CPT | Performed by: INTERNAL MEDICINE

## 2022-10-18 PROCEDURE — 25010000002 PIPERACILLIN SOD-TAZOBACTAM PER 1 G: Performed by: EMERGENCY MEDICINE

## 2022-10-18 PROCEDURE — 81001 URINALYSIS AUTO W/SCOPE: CPT | Performed by: EMERGENCY MEDICINE

## 2022-10-18 PROCEDURE — 84145 PROCALCITONIN (PCT): CPT | Performed by: EMERGENCY MEDICINE

## 2022-10-18 PROCEDURE — 82150 ASSAY OF AMYLASE: CPT | Performed by: NURSE PRACTITIONER

## 2022-10-18 PROCEDURE — 93005 ELECTROCARDIOGRAM TRACING: CPT | Performed by: EMERGENCY MEDICINE

## 2022-10-18 PROCEDURE — 82533 TOTAL CORTISOL: CPT | Performed by: NURSE PRACTITIONER

## 2022-10-18 PROCEDURE — 25010000002 HEPARIN (PORCINE) PER 1000 UNITS: Performed by: INTERNAL MEDICINE

## 2022-10-18 PROCEDURE — 99285 EMERGENCY DEPT VISIT HI MDM: CPT

## 2022-10-18 PROCEDURE — 87507 IADNA-DNA/RNA PROBE TQ 12-25: CPT | Performed by: NURSE PRACTITIONER

## 2022-10-18 PROCEDURE — 71045 X-RAY EXAM CHEST 1 VIEW: CPT

## 2022-10-18 PROCEDURE — 74177 CT ABD & PELVIS W/CONTRAST: CPT

## 2022-10-18 PROCEDURE — 25010000002 ONDANSETRON PER 1 MG: Performed by: EMERGENCY MEDICINE

## 2022-10-18 PROCEDURE — 87899 AGENT NOS ASSAY W/OPTIC: CPT | Performed by: NURSE PRACTITIONER

## 2022-10-18 PROCEDURE — 83605 ASSAY OF LACTIC ACID: CPT | Performed by: EMERGENCY MEDICINE

## 2022-10-18 PROCEDURE — 80050 GENERAL HEALTH PANEL: CPT | Performed by: EMERGENCY MEDICINE

## 2022-10-18 PROCEDURE — 93306 TTE W/DOPPLER COMPLETE: CPT | Performed by: INTERNAL MEDICINE

## 2022-10-18 PROCEDURE — 99291 CRITICAL CARE FIRST HOUR: CPT | Performed by: INTERNAL MEDICINE

## 2022-10-18 PROCEDURE — 80306 DRUG TEST PRSMV INSTRMNT: CPT | Performed by: INTERNAL MEDICINE

## 2022-10-18 PROCEDURE — 76705 ECHO EXAM OF ABDOMEN: CPT

## 2022-10-18 PROCEDURE — 93306 TTE W/DOPPLER COMPLETE: CPT

## 2022-10-18 PROCEDURE — 87449 NOS EACH ORGANISM AG IA: CPT | Performed by: NURSE PRACTITIONER

## 2022-10-18 PROCEDURE — 82077 ASSAY SPEC XCP UR&BREATH IA: CPT | Performed by: NURSE PRACTITIONER

## 2022-10-18 PROCEDURE — 25010000002 IOPAMIDOL 61 % SOLUTION: Performed by: EMERGENCY MEDICINE

## 2022-10-18 PROCEDURE — 36415 COLL VENOUS BLD VENIPUNCTURE: CPT

## 2022-10-18 PROCEDURE — 87636 SARSCOV2 & INF A&B AMP PRB: CPT | Performed by: EMERGENCY MEDICINE

## 2022-10-18 PROCEDURE — 25010000002 KETOROLAC TROMETHAMINE PER 15 MG: Performed by: EMERGENCY MEDICINE

## 2022-10-18 PROCEDURE — 83690 ASSAY OF LIPASE: CPT | Performed by: EMERGENCY MEDICINE

## 2022-10-18 PROCEDURE — 93005 ELECTROCARDIOGRAM TRACING: CPT

## 2022-10-18 PROCEDURE — 99284 EMERGENCY DEPT VISIT MOD MDM: CPT

## 2022-10-18 PROCEDURE — 87040 BLOOD CULTURE FOR BACTERIA: CPT | Performed by: EMERGENCY MEDICINE

## 2022-10-18 PROCEDURE — 87493 C DIFF AMPLIFIED PROBE: CPT | Performed by: NURSE PRACTITIONER

## 2022-10-18 RX ORDER — NOREPINEPHRINE BIT/0.9 % NACL 8 MG/250ML
.02-.3 INFUSION BOTTLE (ML) INTRAVENOUS
Status: DISCONTINUED | OUTPATIENT
Start: 2022-10-18 | End: 2022-10-20 | Stop reason: HOSPADM

## 2022-10-18 RX ORDER — SODIUM CHLORIDE, SODIUM LACTATE, POTASSIUM CHLORIDE, CALCIUM CHLORIDE 600; 310; 30; 20 MG/100ML; MG/100ML; MG/100ML; MG/100ML
75 INJECTION, SOLUTION INTRAVENOUS CONTINUOUS
Status: DISCONTINUED | OUTPATIENT
Start: 2022-10-18 | End: 2022-10-19

## 2022-10-18 RX ORDER — KETOROLAC TROMETHAMINE 15 MG/ML
15 INJECTION, SOLUTION INTRAMUSCULAR; INTRAVENOUS ONCE
Status: COMPLETED | OUTPATIENT
Start: 2022-10-18 | End: 2022-10-18

## 2022-10-18 RX ORDER — ACETAMINOPHEN 325 MG/1
650 TABLET ORAL EVERY 6 HOURS PRN
Status: DISCONTINUED | OUTPATIENT
Start: 2022-10-18 | End: 2022-10-18 | Stop reason: SDUPTHER

## 2022-10-18 RX ORDER — ACETAMINOPHEN 325 MG/1
650 TABLET ORAL EVERY 6 HOURS PRN
Status: DISCONTINUED | OUTPATIENT
Start: 2022-10-18 | End: 2022-10-20 | Stop reason: HOSPADM

## 2022-10-18 RX ORDER — ONDANSETRON 2 MG/ML
4 INJECTION INTRAMUSCULAR; INTRAVENOUS ONCE
Status: COMPLETED | OUTPATIENT
Start: 2022-10-18 | End: 2022-10-18

## 2022-10-18 RX ORDER — HEPARIN SODIUM 5000 [USP'U]/ML
5000 INJECTION, SOLUTION INTRAVENOUS; SUBCUTANEOUS EVERY 8 HOURS SCHEDULED
Status: DISCONTINUED | OUTPATIENT
Start: 2022-10-18 | End: 2022-10-20 | Stop reason: HOSPADM

## 2022-10-18 RX ORDER — SODIUM CHLORIDE 9 MG/ML
10 INJECTION INTRAVENOUS AS NEEDED
Status: DISCONTINUED | OUTPATIENT
Start: 2022-10-18 | End: 2022-10-20 | Stop reason: HOSPADM

## 2022-10-18 RX ORDER — PANTOPRAZOLE SODIUM 40 MG/1
40 TABLET, DELAYED RELEASE ORAL
Status: DISCONTINUED | OUTPATIENT
Start: 2022-10-19 | End: 2022-10-20 | Stop reason: HOSPADM

## 2022-10-18 RX ADMIN — SODIUM CHLORIDE, POTASSIUM CHLORIDE, SODIUM LACTATE AND CALCIUM CHLORIDE 75 ML/HR: 600; 310; 30; 20 INJECTION, SOLUTION INTRAVENOUS at 17:58

## 2022-10-18 RX ADMIN — HEPARIN SODIUM 5000 UNITS: 5000 INJECTION INTRAVENOUS; SUBCUTANEOUS at 22:01

## 2022-10-18 RX ADMIN — KETOROLAC TROMETHAMINE 15 MG: 15 INJECTION, SOLUTION INTRAMUSCULAR; INTRAVENOUS at 12:32

## 2022-10-18 RX ADMIN — SODIUM CHLORIDE 1000 ML: 9 INJECTION, SOLUTION INTRAVENOUS at 13:44

## 2022-10-18 RX ADMIN — Medication 0.02 MCG/KG/MIN: at 23:07

## 2022-10-18 RX ADMIN — TAZOBACTAM SODIUM AND PIPERACILLIN SODIUM 4.5 G: 500; 4 INJECTION, SOLUTION INTRAVENOUS at 16:55

## 2022-10-18 RX ADMIN — ONDANSETRON 4 MG: 2 INJECTION INTRAMUSCULAR; INTRAVENOUS at 12:33

## 2022-10-18 RX ADMIN — IOPAMIDOL 95 ML: 612 INJECTION, SOLUTION INTRAVENOUS at 13:15

## 2022-10-18 RX ADMIN — ACETAMINOPHEN 650 MG: 325 TABLET, FILM COATED ORAL at 18:48

## 2022-10-18 RX ADMIN — SODIUM CHLORIDE 1000 ML: 9 INJECTION, SOLUTION INTRAVENOUS at 12:31

## 2022-10-18 NOTE — H&P
Intensive Care Admission Note     Sepsis (HCC)    History of Present Illness     Mishel Freeman is a 47 y.o. female smoker with PMH of GERD, HTN, C-diff infection, and LLL nodule s/p EBUS (negative) who presents to [facility] ED on 10/18/22 with abdominal pain and hypotension.     The patient denotes nausea, vomiting, myalgias, weakness, dizziness, and abdominal pain that started this morning with low grade fever. CT A/P unrevealing for acute process. Labs do show a LA of 3.7 and WBC of 19, however PCT is 0.05. UA unrevealing for UTI and CXR currently pending. BP marginal in the 80-90s despite 2L NS and given empiric Zosyn. She will be admitted to the ICU for higher level of care.     She has been evaluated by Dr. Godwin in the past for abdominal pain, bloating, and nausea/vomiting. Biopsies were negative and celiac panel was unrevealing. In 2019 she required admission for abdominal pain and CT imaging was negative for small bowel obstruction or internal hernia but did show enteritis. She was found to be C. Diff positive treated with PO Vanc per ID as well as norovirus.     Time spent: 15 minutes  Electronically signed by PATY Bustos, 10/18/22, 4:40 PM EDT.    Problem List, Surgical History, Family, Social History, and ROS     Patient Active Problem List    Diagnosis    • *Sepsis [A41.9]    • Class 1 obesity in adult [E66.9]    • H/O Clostridium difficile/norovirus infection (2019)  [Z86.19]    • GERD  [K21.9]    • Tobacco use [Z72.0]    • Abdominal pain [R10.9]    • Lactic acidosis [E87.20]    • HTN (hypertension) [I10]    • Leukocytosis [D72.829]    • Current smoker [F17.200]    • Nodule of lower lobe of left lung, 2.2 cm noncalcified [R91.1]    • Mass of lower lobe of left lung [R91.8]    • Smoker [F17.200]    • Microscopic hematuria [R31.29]      Past Surgical History:   Procedure Laterality Date   • BRONCHOSCOPY N/A 7/17/2019    Procedure: DARIN, RADIAL EBUS WITH FLUORO BRONCH;  Surgeon: Zenia,  Nikhil MCGUIRE MD;  Location: [facility] ENDOSCOPY;  Service: Pulmonary   • CHOLECYSTECTOMY  1991   • COLONOSCOPY  2016, 2017   • ENDOMETRIAL ABLATION W/ NOVASURE  2010    WITH LAP BTL   • VAGINAL HYSTERECTOMY  2010    Partial        Allergies   Allergen Reactions   • Macrobid [Nitrofurantoin Macrocrystal] Hives and Shortness Of Breath     No current facility-administered medications on file prior to encounter.     Current Outpatient Medications on File Prior to Encounter   Medication Sig   • acetaminophen (TYLENOL) 500 MG tablet Take 2 tablets by mouth Every 6 (Six) Hours As Needed for Mild Pain  or Moderate Pain .   • aspirin-acetaminophen-caffeine (EXCEDRIN MIGRAINE) 250-250-65 MG per tablet Take 1 tablet by mouth Every 6 (Six) Hours As Needed for Headache.   • desvenlafaxine (PRISTIQ) 50 MG 24 hr tablet Take 50 mg by mouth Daily.   • ketorolac (TORADOL) 10 MG tablet Take 1 tablet by mouth Every 6 (Six) Hours As Needed for Moderate Pain . Received a dose in the ER.   • lisinopril-hydrochlorothiazide (PRINZIDE,ZESTORETIC) 20-12.5 MG per tablet Take 1 tablet by mouth Daily.   • mupirocin (BACTROBAN) 2 % ointment Apply 1 application topically to the appropriate area as directed 3 (Three) Times a Day.   • vilazodone (VIIBRYD) 10 MG tablet tablet Take 10 mg by mouth Daily.     MEDICATION LIST AND ALLERGIES REVIEWED.    Family History   Problem Relation Age of Onset   • Breast cancer Paternal Aunt         40's   • Cancer Mother    • Diabetes Father    • Diabetes Brother    • Cancer Maternal Aunt    • Ovarian cancer Neg Hx      Social History     Tobacco Use   • Smoking status: Every Day     Packs/day: 0.25     Years: 30.00     Pack years: 7.50     Types: Cigarettes   • Smokeless tobacco: Never   • Tobacco comments:     5 a day    Substance Use Topics   • Alcohol use: Yes     Alcohol/week: 1.0 standard drink     Types: 1 Cans of beer per week     Comment: socially   • Drug use: No     Social History     Social History Narrative  "   Lives in [place].      FAMILY AND SOCIAL HISTORY REVIEWED.    Review of Systems  ALL OTHER SYSTEMS REVIEWED AND ARE NEGATIVE.    Physical Exam and Clinical Information   /77   Pulse 104   Temp 99.5 °F (37.5 °C) (Oral)   Resp 22   Ht 157.5 cm (62\")   Wt 79.4 kg (175 lb)   SpO2 98%   BMI 32.01 kg/m²   Physical Exam  General Appearance: Awake, alert, in mild distress due to chills  Head:    Atraumatic, Normocephalic, without obvious abnormality, Pupils reactive & symmetrical B/L.  Neck:   Supple.   Lungs:   B/L Breath sounds present with decreased breath sounds on bases, no wheezing heard, no crackles.   Heart: S1 and S2 present, no murmur, sinus tachycardia  Abdomen: Soft, mildly tender epigastric area, no guarding or rigidity, bowel sounds positive, no masses.  Extremities:  no cyanosis or clubbing,  no edema, warm to touch.  Pulses: Positive and symmetric.  Capillary refill: normal  Neurologic:  Moving all four extremities. Good strength bilaterally.   Psychological: Slightly anxious      Results from last 7 days   Lab Units 10/18/22  1147   WBC 10*3/mm3 19.68*   HEMOGLOBIN g/dL 12.0   PLATELETS 10*3/mm3 324     Results from last 7 days   Lab Units 10/18/22  1147   SODIUM mmol/L 135*   POTASSIUM mmol/L 4.4   CO2 mmol/L 20.0*   BUN mg/dL 12   CREATININE mg/dL 0.85   GLUCOSE mg/dL 91     Estimated Creatinine Clearance: 79 mL/min (by C-G formula based on SCr of 0.85 mg/dL).          Lab Results   Component Value Date    LACTATE 1.8 10/18/2022        Images:   Abdominal CT reviewed,.   IMPRESSION:     1. No acute findings in the abdomen or pelvis.  2. Sigmoid diverticulosis without evidence of acute diverticulitis.  Normal appendix.  3. Small bilateral ovarian cysts. Right ovarian cyst has diminished in  size since 03/31/2021.  4. Hysterectomy. Cholecystectomy.  5. Surgical changes in the left lower lobe. A previously described 2 cm  left lower lobe lung nodule is no longer visualized.           This " report was finalized on 10/18/2022 1:34 PM by Jodie Jeffrey MD.      I reviewed the patient's results and images.     Chest x-ray shows right midlung opacity.  Left lung is clear.  Mild cardiomegaly.    EKG reviewed and showed sinus tachycardia without any acute ST changes.    Impression     Sepsis    Lactic acidosis    Class 1 obesity in adult    H/O Clostridium difficile/norovirus infection (2019)     GERD     Tobacco use    Plan/Recommendations     47-year-old female who is a active smoker smoking 1 pack/week, GERD, hypertension, previous history of C. difficile infection, previous left lower lobe nodule with negative pathology presenting with complains of fever, chills and abdominal discomfort along with nausea.  Patient states that she woke up and she was doing well and when she went to work she was found to have low-grade fever and chills.  She was advised to go to emergency room.  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  In the ER further work-up was done and patient was found to have significant leukocytosis with neutrophilia, lactic acidosis and patient was tachycardic with heart rate of 135.  She was given 2 L fluid bolus and despite that her blood pressure remains marginal so decision was made to admit to intensive care unit.  Patient was worked up with abdominal CT scan which is negative for any acute pathology.  Chest x-ray is showing right midlung opacity about patient denies any ongoing cough or sputum production.  Denies any other sick contacts.  She lives with her  who is healthy.  Denies any recent travel anywhere.  No history of blood clots.  No diarrhea or loose stools.  Denies any antibiotic use.  Denies any steroid use recently either.  Denies any headaches or vision problems or any other neurological weakness.  Denies any chest pain or angina symptoms.    1.  Admit to intensive care unit for closer hemodynamic  monitoring.  2.  Patient does appear septic but source of infection is not clear.  Procalcitonin is negative.  Wondering if it is some sort of viral prodrome with atypical pneumonia.  Will send complete respiratory viral panel.  Bacterial infection cannot be ruled out given acuity of symptoms and will cover with Zosyn empirically for now.  Urinalysis negative for any infection.  Will get GI panel if she has any loose stools.  Send urinary Legionella and strep antigens as well.  We will check liver ultrasound to make sure were not missing any other pathology such as ascending cholangitis.  3.  Patient is given 2 L of fluid bolus.  I will continue lactated Ringer at 75 mL/h for next 12 hours and monitor closely.  Lactic acidosis is resolved already.   4.  GI prophylaxis and DVT prophylaxis return.  5.  Smoking cessation counseled.  6.  Trend troponin.  Will get echocardiogram to evaluate for cardiac function  7.  Check cortisol and TSH.  8.  Monitor urine output and electrolytes closely.  9.  If hemodynamically does not stabilize despite fluids will consider pressors.    Check labs in the morning including procalcitonin level and repeat chest x-ray.    This is where things stand at this point.  Further changes to the plan will be made as more data obtained.  Discussed plan of care with patient.    High level of risk due to:  illness with threat to life or bodily function.    Time spent Critical care 35 min (exclusive of procedure time)  including high complexity decision making to assess, manipulate, and support vital organ system failure in this individual who has impairment of one or more vital organ systems such that there is a high probability of imminent or life threatening deterioration in the patient’s condition.      Asher Sims MD, [facility]  Pulmonary and Critical Care Medicine  10/18/22 17:17 EDT     CC: Anastacia Jose MD

## 2022-10-18 NOTE — ED PROVIDER NOTES
Subjective   History of Present Illness    Pt presents with abdominal pain, nausea, vomiting, myalgias, weak and dizzy that started this morning. Felt well last night.  Symptoms worse at work and temp 99.9 so they sent her here.  Now she has chills as well.  No diarrhea.  No cough, chest pain, dyspnea.    PMH HTN, recent decrease in lisinopril.  No recent abx.  Surgical history includes hysterectomy and CCY.    History provided by:  Patient      Review of Systems   Constitutional: Positive for chills and fever.   Respiratory: Negative for cough and shortness of breath.    Cardiovascular: Negative for chest pain.   Gastrointestinal: Positive for abdominal pain, nausea and vomiting. Negative for diarrhea.   Genitourinary: Negative for dysuria.   Musculoskeletal: Positive for myalgias.   All other systems reviewed and are negative.      Past Medical History:   Diagnosis Date   • Anxiety    • Depression    • Diverticulitis    • Eczema    • GERD (gastroesophageal reflux disease)    • Headache    • Hypertension    • Lung nodule    • Tobacco abuse        Allergies   Allergen Reactions   • Macrobid [Nitrofurantoin Macrocrystal] Hives and Shortness Of Breath       Past Surgical History:   Procedure Laterality Date   • BRONCHOSCOPY N/A 7/17/2019    Procedure: DARIN, RADIAL EBUS WITH FLUORO BRONCH;  Surgeon: Nikhil Knutson MD;  Location: [facility] ENDOSCOPY;  Service: Pulmonary   • CHOLECYSTECTOMY  1991   • COLONOSCOPY  2016, 2017   • ENDOMETRIAL ABLATION W/ NOVASURE  2010    WITH LAP BTL   • VAGINAL HYSTERECTOMY  2010    Partial        Family History   Problem Relation Age of Onset   • Breast cancer Paternal Aunt         40's   • Cancer Mother    • Diabetes Father    • Diabetes Brother    • Cancer Maternal Aunt    • Ovarian cancer Neg Hx        Social History     Socioeconomic History   • Marital status:    • Number of children: 1   Tobacco Use   • Smoking status: Every Day     Packs/day: 0.25     Years: 30.00      Pack years: 7.50     Types: Cigarettes   • Smokeless tobacco: Never   • Tobacco comments:     5 a day    Substance and Sexual Activity   • Alcohol use: Yes     Alcohol/week: 1.0 standard drink     Types: 1 Cans of beer per week     Comment: socially   • Drug use: No   • Sexual activity: Defer           Objective   Physical Exam  Vitals and nursing note reviewed.   Constitutional:       General: She is not in acute distress.     Appearance: Normal appearance. She is not ill-appearing.   HENT:      Head: Normocephalic and atraumatic.      Mouth/Throat:      Mouth: Mucous membranes are moist.   Eyes:      General: No scleral icterus.        Right eye: No discharge.         Left eye: No discharge.      Conjunctiva/sclera: Conjunctivae normal.   Cardiovascular:      Rate and Rhythm: Regular rhythm. Tachycardia present.      Heart sounds: No murmur heard.  Pulmonary:      Effort: Pulmonary effort is normal. No respiratory distress.      Breath sounds: Normal breath sounds. No wheezing.   Abdominal:      General: Bowel sounds are normal. There is no distension.      Palpations: Abdomen is soft.      Tenderness: There is generalized abdominal tenderness. There is no guarding or rebound.   Musculoskeletal:         General: No swelling. Normal range of motion.      Cervical back: Normal range of motion and neck supple.   Skin:     General: Skin is warm and dry.      Findings: No rash.   Neurological:      General: No focal deficit present.      Mental Status: She is alert and oriented to person, place, and time. Mental status is at baseline.   Psychiatric:         Mood and Affect: Mood normal.         Behavior: Behavior normal.         Thought Content: Thought content normal.         Critical Care  Performed by: Shravan Laughlin MD  Authorized by: Shravan Laughlin MD     Critical care provider statement:     Critical care time (minutes):  45    Critical care time was exclusive of:  Separately billable procedures and  treating other patients    Critical care was necessary to treat or prevent imminent or life-threatening deterioration of the following conditions:  Sepsis    Critical care was time spent personally by me on the following activities:  Obtaining history from patient or surrogate, ordering and performing treatments and interventions, ordering and review of laboratory studies, ordering and review of radiographic studies, re-evaluation of patient's condition, evaluation of patient's response to treatment and examination of patient    I assumed direction of critical care for this patient from another provider in my specialty: no      Care discussed with: admitting provider                 ED Course         Labs notable for lactic acidosis as well as an elevated WBC count.  A source of infection is not identified.  CT negative.  Pt given meds and fluids. Her symptoms got better and HR improved but her BP continued to trend down.  We rechecked her temp as she developed some chills and it was 99.3.  2L saline infused and BP at the end was 80/51 and .  Levophed ordered but her MAP rebounded a bit so we held off on giving it.  Empiric sepsis abx.  Discussed concerns with patient.  Admitted to ICU for further care.                                  MDM  Number of Diagnoses or Management Options     Amount and/or Complexity of Data Reviewed  Clinical lab tests: ordered and reviewed  Tests in the radiology section of CPT®: ordered and reviewed  Discuss the patient with other providers: yes  Independent visualization of images, tracings, or specimens: yes    Critical Care  Total time providing critical care: 30-74 minutes      Final diagnoses:   Septic shock (HCC)       ED Disposition  ED Disposition     ED Disposition   Decision to Admit    Condition   --    Comment   Level of Care: Critical Care [6]   Admitting Physician: KINZA SHAH [[id number]]               No follow-up provider specified.       Medication List      No  changes were made to your prescriptions during this visit.          Shravan Laughlin MD  10/18/22 1920

## 2022-10-19 ENCOUNTER — APPOINTMENT (OUTPATIENT)
Dept: GENERAL RADIOLOGY | Facility: HOSPITAL | Age: 47
End: 2022-10-19

## 2022-10-19 ENCOUNTER — APPOINTMENT (OUTPATIENT)
Dept: CT IMAGING | Facility: HOSPITAL | Age: 47
End: 2022-10-19

## 2022-10-19 PROBLEM — R65.21 SEPTIC SHOCK (HCC): Status: ACTIVE | Noted: 2022-10-19

## 2022-10-19 PROBLEM — A41.9 SEPTIC SHOCK (HCC): Status: ACTIVE | Noted: 2022-10-19

## 2022-10-19 LAB
ADV 40+41 DNA STL QL NAA+NON-PROBE: NOT DETECTED
ALBUMIN SERPL-MCNC: 3.4 G/DL (ref 3.5–5.2)
ALBUMIN/GLOB SERPL: 1.3 G/DL
ALP SERPL-CCNC: 61 U/L (ref 39–117)
ALT SERPL W P-5'-P-CCNC: 37 U/L (ref 1–33)
ANION GAP SERPL CALCULATED.3IONS-SCNC: 10 MMOL/L (ref 5–15)
AST SERPL-CCNC: 43 U/L (ref 1–32)
ASTRO TYP 1-8 RNA STL QL NAA+NON-PROBE: NOT DETECTED
B PARAPERT DNA SPEC QL NAA+PROBE: NOT DETECTED
B PERT DNA SPEC QL NAA+PROBE: NOT DETECTED
BASOPHILS # BLD AUTO: 0.06 10*3/MM3 (ref 0–0.2)
BASOPHILS NFR BLD AUTO: 0.2 % (ref 0–1.5)
BILIRUB SERPL-MCNC: 0.6 MG/DL (ref 0–1.2)
BUN SERPL-MCNC: 10 MG/DL (ref 6–20)
BUN/CREAT SERPL: 13.7 (ref 7–25)
C CAYETANENSIS DNA STL QL NAA+NON-PROBE: NOT DETECTED
C COLI+JEJ+UPSA DNA STL QL NAA+NON-PROBE: NOT DETECTED
C DIFF TOX GENS STL QL NAA+PROBE: NOT DETECTED
C PNEUM DNA NPH QL NAA+NON-PROBE: NOT DETECTED
CALCIUM SPEC-SCNC: 8.4 MG/DL (ref 8.6–10.5)
CHLORIDE SERPL-SCNC: 101 MMOL/L (ref 98–107)
CO2 SERPL-SCNC: 21 MMOL/L (ref 22–29)
CREAT SERPL-MCNC: 0.73 MG/DL (ref 0.57–1)
CRYPTOSP DNA STL QL NAA+NON-PROBE: NOT DETECTED
D-LACTATE SERPL-SCNC: 1.2 MMOL/L (ref 0.5–2)
DEPRECATED RDW RBC AUTO: 46.2 FL (ref 37–54)
E HISTOLYT DNA STL QL NAA+NON-PROBE: NOT DETECTED
EAEC PAA PLAS AGGR+AATA ST NAA+NON-PRB: NOT DETECTED
EC STX1+STX2 GENES STL QL NAA+NON-PROBE: NOT DETECTED
EGFRCR SERPLBLD CKD-EPI 2021: 101.6 ML/MIN/1.73
EOSINOPHIL # BLD AUTO: 0.02 10*3/MM3 (ref 0–0.4)
EOSINOPHIL NFR BLD AUTO: 0.1 % (ref 0.3–6.2)
EPEC EAE GENE STL QL NAA+NON-PROBE: NOT DETECTED
ERYTHROCYTE [DISTWIDTH] IN BLOOD BY AUTOMATED COUNT: 13.5 % (ref 12.3–15.4)
ETEC LTA+ST1A+ST1B TOX ST NAA+NON-PROBE: NOT DETECTED
FLUAV SUBTYP SPEC NAA+PROBE: NOT DETECTED
FLUBV RNA ISLT QL NAA+PROBE: NOT DETECTED
G LAMBLIA DNA STL QL NAA+NON-PROBE: NOT DETECTED
GLOBULIN UR ELPH-MCNC: 2.7 GM/DL
GLUCOSE SERPL-MCNC: 104 MG/DL (ref 65–99)
HADV DNA SPEC NAA+PROBE: NOT DETECTED
HCOV 229E RNA SPEC QL NAA+PROBE: NOT DETECTED
HCOV HKU1 RNA SPEC QL NAA+PROBE: NOT DETECTED
HCOV NL63 RNA SPEC QL NAA+PROBE: NOT DETECTED
HCOV OC43 RNA SPEC QL NAA+PROBE: NOT DETECTED
HCT VFR BLD AUTO: 30.9 % (ref 34–46.6)
HGB BLD-MCNC: 10.3 G/DL (ref 12–15.9)
HMPV RNA NPH QL NAA+NON-PROBE: NOT DETECTED
HPIV1 RNA ISLT QL NAA+PROBE: NOT DETECTED
HPIV2 RNA SPEC QL NAA+PROBE: NOT DETECTED
HPIV3 RNA NPH QL NAA+PROBE: NOT DETECTED
HPIV4 P GENE NPH QL NAA+PROBE: NOT DETECTED
IMM GRANULOCYTES # BLD AUTO: 0.27 10*3/MM3 (ref 0–0.05)
IMM GRANULOCYTES NFR BLD AUTO: 1 % (ref 0–0.5)
L PNEUMO1 AG UR QL IA: NEGATIVE
LYMPHOCYTES # BLD AUTO: 2.97 10*3/MM3 (ref 0.7–3.1)
LYMPHOCYTES NFR BLD AUTO: 10.7 % (ref 19.6–45.3)
M PNEUMO IGG SER IA-ACNC: NOT DETECTED
MAGNESIUM SERPL-MCNC: 1.5 MG/DL (ref 1.6–2.6)
MCH RBC QN AUTO: 31.2 PG (ref 26.6–33)
MCHC RBC AUTO-ENTMCNC: 33.3 G/DL (ref 31.5–35.7)
MCV RBC AUTO: 93.6 FL (ref 79–97)
MONOCYTES # BLD AUTO: 1.3 10*3/MM3 (ref 0.1–0.9)
MONOCYTES NFR BLD AUTO: 4.7 % (ref 5–12)
MRSA DNA SPEC QL NAA+PROBE: NEGATIVE
NEUTROPHILS NFR BLD AUTO: 23.13 10*3/MM3 (ref 1.7–7)
NEUTROPHILS NFR BLD AUTO: 83.3 % (ref 42.7–76)
NOROVIRUS GI+II RNA STL QL NAA+NON-PROBE: NOT DETECTED
NRBC BLD AUTO-RTO: 0 /100 WBC (ref 0–0.2)
P SHIGELLOIDES DNA STL QL NAA+NON-PROBE: NOT DETECTED
PHOSPHATE SERPL-MCNC: 4.3 MG/DL (ref 2.5–4.5)
PLATELET # BLD AUTO: 258 10*3/MM3 (ref 140–450)
PMV BLD AUTO: 8.8 FL (ref 6–12)
POTASSIUM SERPL-SCNC: 3.7 MMOL/L (ref 3.5–5.2)
PROT SERPL-MCNC: 6.1 G/DL (ref 6–8.5)
RBC # BLD AUTO: 3.3 10*6/MM3 (ref 3.77–5.28)
RHINOVIRUS RNA SPEC NAA+PROBE: NOT DETECTED
RSV RNA NPH QL NAA+NON-PROBE: NOT DETECTED
RVA RNA STL QL NAA+NON-PROBE: NOT DETECTED
S ENT+BONG DNA STL QL NAA+NON-PROBE: NOT DETECTED
S PNEUM AG SPEC QL LA: NEGATIVE
SAPO I+II+IV+V RNA STL QL NAA+NON-PROBE: NOT DETECTED
SARS-COV-2 RNA NPH QL NAA+NON-PROBE: NOT DETECTED
SHIGELLA SP+EIEC IPAH ST NAA+NON-PROBE: NOT DETECTED
SODIUM SERPL-SCNC: 132 MMOL/L (ref 136–145)
TROPONIN T SERPL-MCNC: <0.01 NG/ML (ref 0–0.03)
V CHOL+PARA+VUL DNA STL QL NAA+NON-PROBE: NOT DETECTED
V CHOLERAE DNA STL QL NAA+NON-PROBE: NOT DETECTED
WBC NRBC COR # BLD: 27.75 10*3/MM3 (ref 3.4–10.8)
Y ENTEROCOL DNA STL QL NAA+NON-PROBE: NOT DETECTED

## 2022-10-19 PROCEDURE — 25010000002 HEPARIN (PORCINE) PER 1000 UNITS: Performed by: INTERNAL MEDICINE

## 2022-10-19 PROCEDURE — 71275 CT ANGIOGRAPHY CHEST: CPT

## 2022-10-19 PROCEDURE — 0 IOPAMIDOL PER 1 ML: Performed by: INTERNAL MEDICINE

## 2022-10-19 PROCEDURE — 99232 SBSQ HOSP IP/OBS MODERATE 35: CPT | Performed by: INTERNAL MEDICINE

## 2022-10-19 PROCEDURE — 87641 MR-STAPH DNA AMP PROBE: CPT | Performed by: NURSE PRACTITIONER

## 2022-10-19 PROCEDURE — 80053 COMPREHEN METABOLIC PANEL: CPT | Performed by: INTERNAL MEDICINE

## 2022-10-19 PROCEDURE — 0202U NFCT DS 22 TRGT SARS-COV-2: CPT | Performed by: NURSE PRACTITIONER

## 2022-10-19 PROCEDURE — 84100 ASSAY OF PHOSPHORUS: CPT | Performed by: NURSE PRACTITIONER

## 2022-10-19 PROCEDURE — 84484 ASSAY OF TROPONIN QUANT: CPT | Performed by: NURSE PRACTITIONER

## 2022-10-19 PROCEDURE — 71045 X-RAY EXAM CHEST 1 VIEW: CPT

## 2022-10-19 PROCEDURE — 85025 COMPLETE CBC W/AUTO DIFF WBC: CPT | Performed by: INTERNAL MEDICINE

## 2022-10-19 PROCEDURE — 83735 ASSAY OF MAGNESIUM: CPT | Performed by: NURSE PRACTITIONER

## 2022-10-19 PROCEDURE — 25010000002 MAGNESIUM SULFATE 2 GM/50ML SOLUTION: Performed by: NURSE PRACTITIONER

## 2022-10-19 PROCEDURE — 83605 ASSAY OF LACTIC ACID: CPT | Performed by: NURSE PRACTITIONER

## 2022-10-19 PROCEDURE — 25010000002 PIPERACILLIN SOD-TAZOBACTAM PER 1 G: Performed by: INTERNAL MEDICINE

## 2022-10-19 RX ORDER — MAGNESIUM SULFATE HEPTAHYDRATE 40 MG/ML
4 INJECTION, SOLUTION INTRAVENOUS AS NEEDED
Status: DISCONTINUED | OUTPATIENT
Start: 2022-10-19 | End: 2022-10-20 | Stop reason: HOSPADM

## 2022-10-19 RX ORDER — DESVENLAFAXINE SUCCINATE 50 MG/1
100 TABLET, EXTENDED RELEASE ORAL DAILY
Status: DISCONTINUED | OUTPATIENT
Start: 2022-10-19 | End: 2022-10-20 | Stop reason: HOSPADM

## 2022-10-19 RX ORDER — MAGNESIUM SULFATE HEPTAHYDRATE 40 MG/ML
2 INJECTION, SOLUTION INTRAVENOUS AS NEEDED
Status: DISCONTINUED | OUTPATIENT
Start: 2022-10-19 | End: 2022-10-20 | Stop reason: HOSPADM

## 2022-10-19 RX ADMIN — TAZOBACTAM SODIUM AND PIPERACILLIN SODIUM 3.38 G: 375; 3 INJECTION, SOLUTION INTRAVENOUS at 16:23

## 2022-10-19 RX ADMIN — ACETAMINOPHEN 650 MG: 325 TABLET, FILM COATED ORAL at 02:49

## 2022-10-19 RX ADMIN — TAZOBACTAM SODIUM AND PIPERACILLIN SODIUM 3.38 G: 375; 3 INJECTION, SOLUTION INTRAVENOUS at 08:24

## 2022-10-19 RX ADMIN — MAGNESIUM SULFATE HEPTAHYDRATE 2 G: 2 INJECTION, SOLUTION INTRAVENOUS at 04:01

## 2022-10-19 RX ADMIN — PANTOPRAZOLE SODIUM 40 MG: 40 TABLET, DELAYED RELEASE ORAL at 05:19

## 2022-10-19 RX ADMIN — HEPARIN SODIUM 5000 UNITS: 5000 INJECTION INTRAVENOUS; SUBCUTANEOUS at 14:52

## 2022-10-19 RX ADMIN — TAZOBACTAM SODIUM AND PIPERACILLIN SODIUM 3.38 G: 375; 3 INJECTION, SOLUTION INTRAVENOUS at 00:20

## 2022-10-19 RX ADMIN — HEPARIN SODIUM 5000 UNITS: 5000 INJECTION INTRAVENOUS; SUBCUTANEOUS at 21:01

## 2022-10-19 RX ADMIN — ACETAMINOPHEN 650 MG: 325 TABLET, FILM COATED ORAL at 22:01

## 2022-10-19 RX ADMIN — ACETAMINOPHEN 650 MG: 325 TABLET, FILM COATED ORAL at 10:26

## 2022-10-19 RX ADMIN — IOPAMIDOL 80 ML: 755 INJECTION, SOLUTION INTRAVENOUS at 14:24

## 2022-10-19 RX ADMIN — MAGNESIUM SULFATE HEPTAHYDRATE 2 G: 2 INJECTION, SOLUTION INTRAVENOUS at 06:55

## 2022-10-19 RX ADMIN — MAGNESIUM SULFATE HEPTAHYDRATE 2 G: 2 INJECTION, SOLUTION INTRAVENOUS at 05:20

## 2022-10-19 RX ADMIN — HEPARIN SODIUM 5000 UNITS: 5000 INJECTION INTRAVENOUS; SUBCUTANEOUS at 05:19

## 2022-10-19 RX ADMIN — DOXYCYCLINE 100 MG: 100 INJECTION, POWDER, LYOPHILIZED, FOR SOLUTION INTRAVENOUS at 21:02

## 2022-10-19 RX ADMIN — DESVENLAFAXINE SUCCINATE 100 MG: 50 TABLET, EXTENDED RELEASE ORAL at 11:27

## 2022-10-19 RX ADMIN — ACETAMINOPHEN 650 MG: 325 TABLET, FILM COATED ORAL at 16:20

## 2022-10-19 RX ADMIN — DOXYCYCLINE 100 MG: 100 INJECTION, POWDER, LYOPHILIZED, FOR SOLUTION INTRAVENOUS at 10:10

## 2022-10-19 NOTE — CASE MANAGEMENT/SOCIAL WORK
Discharge Planning Assessment  [facility]     Patient Name: Mishel Freeman  MRN: [id number]  Today's Date: 10/19/2022    Admit Date: 10/18/2022    Plan: home   Discharge Needs Assessment     Row Name 10/19/22 1027       Living Environment    People in Home spouse    Current Living Arrangements home    Primary Care Provided by self    Provides Primary Care For no one    Family Caregiver if Needed spouse    Quality of Family Relationships helpful;involved;supportive    Able to Return to Prior Arrangements yes       Resource/Environmental Concerns    Resource/Environmental Concerns none       Transition Planning    Patient/Family Anticipates Transition to home with family    Transportation Anticipated family or friend will provide       Discharge Needs Assessment    Equipment Currently Used at Home none               Discharge Plan     Row Name 10/19/22 1029       Plan    Plan home    Patient/Family in Agreement with Plan yes    Plan Comments Mrs. Freeman lives in [facility] [place] [facility] with her spouse.  She is independent with ADL's and does not use any DME.  She is not current with home health or PT.  She has prescription coverage with insurance.  Plan is home at discharge and spouse will transport.    Final Discharge Disposition Code 01 - home or self-care              Continued Care and Services - Admitted Since 10/18/2022    Coordination has not been started for this encounter.       Expected Discharge Date and Time     Expected Discharge Date Expected Discharge Time    Oct 24, 2022          Demographic Summary     Row Name 10/19/22 1025       General Information    Admission Type inpatient    Reason for Consult discharge planning    Preferred Language English    General Information Comments PCP - Anastacia Jose               Functional Status     Row Name 10/19/22 1026       Functional Status    Usual Activity Tolerance good    Current Activity Tolerance good       Functional Status, IADL    Medications  independent    Meal Preparation independent    Housekeeping independent    Laundry independent    Shopping independent               Psychosocial    No documentation.                Abuse/Neglect    No documentation.                Legal    No documentation.                Substance Abuse    No documentation.                Patient Forms    No documentation.                   Nicole Voss RN

## 2022-10-19 NOTE — PROGRESS NOTES
Intensive Care Follow-up     Hospital:  LOS: 1 day   Ms. Mishel Freeman, 47 y.o. female is followed for:   Sepsis (HCC)            History of present illness:   47-year-old female who is a active smoker smoking 1 pack/week, GERD, hypertension, previous history of C. difficile infection, previous left lower lobe nodule with negative pathology on bronchoscopy subsequently had left lower lobeectomy in December 2021 at [facility] presenting with complains of fever, chills and abdominal discomfort along with nausea.  Patient states that she woke up and she was doing well and when she went to work she was found to have low-grade fever and chills.  She was advised to go to emergency room.  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  In the ER further work-up was done and patient was found to have significant leukocytosis with neutrophilia, lactic acidosis and patient was tachycardic with heart rate of 135.  She was given 2 L fluid bolus and despite that her blood pressure remains marginal so decision was made to admit to intensive care unit.  Patient was worked up with abdominal CT scan which is negative for any acute pathology.  Chest x-ray is showing right midlung opacity about patient denies any ongoing cough or sputum production.  Denies any other sick contacts.  She lives with her  who is healthy.  Denies any recent travel anywhere.  No history of blood clots.  No diarrhea or loose stools.  Denies any antibiotic use.  Denies any steroid use recently either.  Denies any headaches or vision problems or any other neurological weakness.  Denies any chest pain or angina symptoms.  Patient denies any aspiration episodes.    Patient is admitted to the hospital to ICU for closer hemodynamic monitoring overnight.  Initial chest x-ray was showing some groundglass opacities in the right upper lobe without any cough or sputum production  no chest pain.  Repeat x-ray shows worsening consolidation.  Patient was started on antibiotics and feeling better.  Extensive work-up for other sources of sepsis thus far is negative including GI PCR, Clostridium difficile testing.  Viral PCR negative as well.  Abdominal CT did not show any acute pathology.  Liver ultrasound showed mild hepatic steatosis without any other pathology.      Subjective   Interval History:  Patient today morning states that she is feeling a lot better.  She denies any chills.  No fevers noted currently.  Minimal cough with no sputum production.  No chest pain.  No wheezing heard.                 The patient's past medical, surgical and social history were reviewed and updated in Epic as appropriate.       Objective     Infusions:  norepinephrine, 0.02-0.3 mcg/kg/min, Last Rate: Stopped (10/19/22 0600)      Medications:  desvenlafaxine, 100 mg, Oral, Daily  doxycycline, 100 mg, Intravenous, Q12H  heparin (porcine), 5,000 Units, Subcutaneous, Q8H  pantoprazole, 40 mg, Oral, Q AM  piperacillin-tazobactam, 3.375 g, Intravenous, Q8H        Vital Sign Min/Max for last 24 hours  Temp  Min: 98.3 °F (36.8 °C)  Max: 102.5 °F (39.2 °C)   BP  Min: 80/56  Max: 120/80   Pulse  Min: 71  Max: 135   Resp  Min: 18  Max: 30   SpO2  Min: 93 %  Max: 100 %   Flow (L/min)  Min: 3  Max: 3       Input/Output for last 24 hour shift  10/18 0701 - 10/19 0700  In: 3061 [I.V.:1061]  Out: 625 [Urine:625]      Objective  General Appearance: Awake, alert, in no acute distress  Head:    Atraumatic, Normocephalic, without obvious abnormality, Pupils reactive & symmetrical B/L.  Neck:     Supple.            Lungs:   B/L Breath sounds present with decreased breath sounds on bases, no wheezing heard, no crackles.   Heart: S1 and S2 present, no murmur, sinus tachycardia  Abdomen: Soft, nontender, no guarding or rigidity, bowel sounds positive, no masses.  Extremities:  no cyanosis or clubbing,  no edema, warm to  touch.  Pulses: Positive and symmetric.  Capillary refill: normal  Neurologic:  Moving all four extremities. Good strength bilaterally.   Psychological:   Calm and cooperative today  Results from last 7 days   Lab Units 10/19/22  0225 10/18/22  1147   WBC 10*3/mm3 27.75* 19.68*   HEMOGLOBIN g/dL 10.3* 12.0   PLATELETS 10*3/mm3 258 324     Results from last 7 days   Lab Units 10/19/22  0225 10/18/22  1147   SODIUM mmol/L 132* 135*   POTASSIUM mmol/L 3.7 4.4   CO2 mmol/L 21.0* 20.0*   BUN mg/dL 10 12   CREATININE mg/dL 0.73 0.85   MAGNESIUM mg/dL 1.5*  --    PHOSPHORUS mg/dL 4.3  --    GLUCOSE mg/dL 104* 91     Estimated Creatinine Clearance: 86.4 mL/min (by C-G formula based on SCr of 0.73 mg/dL).          Images:   Chest x-ray reviewed and showed dense consolidation in the right upper lobe concerning for lobar pneumonia.  Definitely pronounced infiltrative and consolidative process compared to yesterday.     reviewed the patient's results and images.     Assessment & Plan   Impression        Sepsis    Lactic acidosis    Class 1 obesity in adult    H/O Clostridium difficile/norovirus infection (2019)     GERD     Tobacco use    Septic shock (HCC)       Plan        1.  Patient presenting with sepsis and septic shock picture, doing better.  Fever has improved.  Still has no significant cough or sputum production.  Now the infiltrate looks more consolidative.  On room air saturating well to concern for pulmonary infarct is low.  She has no chest pain.  However does not behave like usual community-acquired pneumonia.  Legionella strep antigens are negative.  Respiratory viral panels are negative as well.  I will proceed with chest CT scan with contrast to further evaluate lung parenchyma and to rule out any postobstructive component with pneumonia as patient is a chronic smoker and will be high risk of malignant process as well.  Recent bronchoscopy was negative.  Subsequently had lower lobectomy for enlarging nodule per  patient report and pathology there was negative as well.  Lactic acidosis has resolved.    2.  Continue Zosyn in the meantime.  Will cover with the doxycycline for atypical coverage for now.    3.  Continue GI prophylaxis.    4.  Replace magnesium.    5.  Advance diet.  Patient having diarrhea but no other viral infection or C. difficile noted.  Continue monitoring.    6.  Continue pulmonary toilet.  If able to produce sputum will culture it.    Patient can be transferred out of ICU to telemetry floor.  Will sign out to hospitalist team for continued care.    Plan of care and goals reviewed with multidisciplinary/antibiotic stewardship team during rounds.   I discussed the patient's findings and my recommendations with patient and nursing staff       Time spent 30 min (exclusive of procedure time)  including high complexity decision making to assess, manipulate, and support vital organ system failure in this individual who has impairment of one or more vital organ systems such that there is a high probability of imminent or life threatening deterioration in the patient’s condition.      Asher Sims MD, FCCP  Pulmonary, Critical care and Sleep Medicine

## 2022-10-19 NOTE — PLAN OF CARE
Goal Outcome Evaluation:  Plan of Care Reviewed With: patient        Progress: improving  Outcome Evaluation: Pt neuro intact, VSS, levo off all day. Low grade temp, tylenol PRN for headache, complaints of rightside rib pain. CT angio of the chest. Ordered to tele.

## 2022-10-19 NOTE — PLAN OF CARE
Problem: Infection Progression (Sepsis/Septic Shock)  Goal: Absence of Infection Signs and Symptoms  Outcome: Ongoing, Not Progressing  Intervention: Promote Recovery  Recent Flowsheet Documentation  Taken 10/19/2022 0400 by Griselda Harmon RN  Activity Management: activity adjusted per tolerance  Taken 10/19/2022 0200 by Griselda Harmon RN  Activity Management: activity adjusted per tolerance  Taken 10/19/2022 0000 by Griselda Harmon RN  Activity Management: activity adjusted per tolerance  Taken 10/18/2022 2200 by Griselda Harmon RN  Activity Management: bedrest  Taken 10/18/2022 2000 by Griselda Harmon RN  Activity Management: bedrest     Problem: Fall Injury Risk  Goal: Absence of Fall and Fall-Related Injury  Outcome: Ongoing, Progressing  Intervention: Identify and Manage Contributors  Recent Flowsheet Documentation  Taken 10/19/2022 0400 by Griselda Harmon RN  Medication Review/Management: medications reviewed  Taken 10/19/2022 0200 by Griselda Harmon RN  Medication Review/Management: medications reviewed  Taken 10/19/2022 0000 by Griselda Harmon RN  Medication Review/Management: medications reviewed  Taken 10/18/2022 2200 by Griselda Harmon RN  Medication Review/Management: medications reviewed  Taken 10/18/2022 2000 by Griselda Harmon RN  Medication Review/Management: medications reviewed  Intervention: Promote Injury-Free Environment  Recent Flowsheet Documentation  Taken 10/19/2022 0400 by Griselda Harmon RN  Safety Promotion/Fall Prevention:   activity supervised   assistive device/personal items within reach   toileting scheduled   safety round/check completed   nonskid shoes/slippers when out of bed   fall prevention program maintained   clutter free environment maintained  Taken 10/19/2022 0200 by Griselda Harmon RN  Safety Promotion/Fall Prevention:   activity supervised   assistive device/personal items within reach   toileting scheduled   safety round/check completed   nonskid  shoes/slippers when out of bed   fall prevention program maintained   clutter free environment maintained  Taken 10/19/2022 0000 by Griselda Harmon RN  Safety Promotion/Fall Prevention:   activity supervised   assistive device/personal items within reach   toileting scheduled   safety round/check completed   nonskid shoes/slippers when out of bed   clutter free environment maintained   fall prevention program maintained  Taken 10/18/2022 2200 by Griselda Harmon RN  Safety Promotion/Fall Prevention:   activity supervised   assistive device/personal items within reach   toileting scheduled   safety round/check completed   nonskid shoes/slippers when out of bed   fall prevention program maintained   clutter free environment maintained  Taken 10/18/2022 2000 by Griselda Harmon RN  Safety Promotion/Fall Prevention:   activity supervised   assistive device/personal items within reach   toileting scheduled   safety round/check completed   nonskid shoes/slippers when out of bed   fall prevention program maintained   clutter free environment maintained   Goal Outcome Evaluation:      Patient remained free from falls and injuries overnight; tmax 99.5, patient does not appear to be diaphoretic and is not complaining of discomfort at this time. Tylenol given for discomfort and fevers. Had 3 liquid bms overnight, 1 formed. Mag 1.5, replacing via iv. A/Ox4, HR 70s/80s, NSR, norepinephrine at .02 d/t map sustaining less than 65.

## 2022-10-19 NOTE — PAYOR COMM NOTE
"Danny Freeman (47 y.o. Female)     [facility]   phone: [phone]  fax: [phone]      Date of Birth   1975    Social Security Number       Address   [address] [facility] [address]    Home Phone   [phone]    MRN   [id number]       Quaker   None    Marital Status                               Admission Date   10/18/22    Admission Type   Emergency    Admitting Provider   Asher Sims MD    Attending Provider   Asher Sims MD    Department, Room/Bed   [facility] 2HSI, S252/1       Discharge Date       Discharge Disposition       Discharge Destination                               Attending Provider: Asher Sims MD    Allergies: Macrobid [Nitrofurantoin Macrocrystal]    Isolation: None   Infection: None   Code Status: CPR    Ht: 157.5 cm (62\")   Wt: 70 kg (154 lb 5.2 oz)    Admission Cmt: None   Principal Problem: Sepsis [A41.9]                 Active Insurance as of 10/18/2022     Primary Coverage     Payor Plan Insurance Group Employer/Plan Group    [employer]HEM BLUE CROSS ANTHEM BLUE CROSS BLUE SHIELD PPO [id number]     Payor Plan Address Payor Plan Phone Number Payor Plan Fax Number Effective Dates    [address] [phone]  1/1/2022 - None Entered    [place] [address]       Subscriber Name Subscriber Birth Date Member ID       DANNY FREEMAN 1975 [id number]                 Emergency Contacts      (Rel.) Home Phone Work Phone Mobile Phone    David Freeman (Spouse) [phone] -- [phone]               History & Physical      Asher Sims MD at 10/18/22 1630          Intensive Care Admission Note     Sepsis (HCC)    History of Present Illness     Danny Freeman is a 47 y.o. female smoker with PMH of GERD, HTN, C-diff infection, and LLL nodule s/p EBUS (negative) who presents to [facility] ED on 10/18/22 with abdominal pain and hypotension.     The patient denotes nausea, vomiting, myalgias, " weakness, dizziness, and abdominal pain that started this morning with low grade fever. CT A/P unrevealing for acute process. Labs do show a LA of 3.7 and WBC of 19, however PCT is 0.05. UA unrevealing for UTI and CXR currently pending. BP marginal in the 80-90s despite 2L NS and given empiric Zosyn. She will be admitted to the ICU for higher level of care.     She has been evaluated by Dr. Godwin in the past for abdominal pain, bloating, and nausea/vomiting. Biopsies were negative and celiac panel was unrevealing. In 2019 she required admission for abdominal pain and CT imaging was negative for small bowel obstruction or internal hernia but did show enteritis. She was found to be C. Diff positive treated with PO Vanc per ID as well as norovirus.     Time spent: 15 minutes  Electronically signed by PATY Bustos, 10/18/22, 4:40 PM EDT.    Problem List, Surgical History, Family, Social History, and ROS     Patient Active Problem List    Diagnosis    • *Sepsis [A41.9]    • Class 1 obesity in adult [E66.9]    • H/O Clostridium difficile/norovirus infection (2019)  [Z86.19]    • GERD  [K21.9]    • Tobacco use [Z72.0]    • Abdominal pain [R10.9]    • Lactic acidosis [E87.20]    • HTN (hypertension) [I10]    • Leukocytosis [D72.829]    • Current smoker [F17.200]    • Nodule of lower lobe of left lung, 2.2 cm noncalcified [R91.1]    • Mass of lower lobe of left lung [R91.8]    • Smoker [F17.200]    • Microscopic hematuria [R31.29]      Past Surgical History:   Procedure Laterality Date   • BRONCHOSCOPY N/A 7/17/2019    Procedure: DARIN, RADIAL EBUS WITH FLUORO BRONCH;  Surgeon: Nikhil Knutson MD;  Location: [facility] ENDOSCOPY;  Service: Pulmonary   • CHOLECYSTECTOMY  1991   • COLONOSCOPY  2016, 2017   • ENDOMETRIAL ABLATION W/ NOVASURE  2010    WITH LAP BTL   • VAGINAL HYSTERECTOMY  2010    Partial        Allergies   Allergen Reactions   • Macrobid [Nitrofurantoin Macrocrystal] Hives and Shortness Of Breath     No  "current facility-administered medications on file prior to encounter.     Current Outpatient Medications on File Prior to Encounter   Medication Sig   • acetaminophen (TYLENOL) 500 MG tablet Take 2 tablets by mouth Every 6 (Six) Hours As Needed for Mild Pain  or Moderate Pain .   • aspirin-acetaminophen-caffeine (EXCEDRIN MIGRAINE) 250-250-65 MG per tablet Take 1 tablet by mouth Every 6 (Six) Hours As Needed for Headache.   • desvenlafaxine (PRISTIQ) 50 MG 24 hr tablet Take 50 mg by mouth Daily.   • ketorolac (TORADOL) 10 MG tablet Take 1 tablet by mouth Every 6 (Six) Hours As Needed for Moderate Pain . Received a dose in the ER.   • lisinopril-hydrochlorothiazide (PRINZIDE,ZESTORETIC) 20-12.5 MG per tablet Take 1 tablet by mouth Daily.   • mupirocin (BACTROBAN) 2 % ointment Apply 1 application topically to the appropriate area as directed 3 (Three) Times a Day.   • vilazodone (VIIBRYD) 10 MG tablet tablet Take 10 mg by mouth Daily.     MEDICATION LIST AND ALLERGIES REVIEWED.    Family History   Problem Relation Age of Onset   • Breast cancer Paternal Aunt         40's   • Cancer Mother    • Diabetes Father    • Diabetes Brother    • Cancer Maternal Aunt    • Ovarian cancer Neg Hx      Social History     Tobacco Use   • Smoking status: Every Day     Packs/day: 0.25     Years: 30.00     Pack years: 7.50     Types: Cigarettes   • Smokeless tobacco: Never   • Tobacco comments:     5 a day    Substance Use Topics   • Alcohol use: Yes     Alcohol/week: 1.0 standard drink     Types: 1 Cans of beer per week     Comment: socially   • Drug use: No     Social History     Social History Narrative    Lives in [place].      FAMILY AND SOCIAL HISTORY REVIEWED.    Review of Systems  ALL OTHER SYSTEMS REVIEWED AND ARE NEGATIVE.    Physical Exam and Clinical Information   /77   Pulse 104   Temp 99.5 °F (37.5 °C) (Oral)   Resp 22   Ht 157.5 cm (62\")   Wt 79.4 kg (175 lb)   SpO2 98%   BMI 32.01 kg/m²   Physical " Exam  General Appearance: Awake, alert, in mild distress due to chills  Head:    Atraumatic, Normocephalic, without obvious abnormality, Pupils reactive & symmetrical B/L.  Neck:   Supple.   Lungs:   B/L Breath sounds present with decreased breath sounds on bases, no wheezing heard, no crackles.   Heart: S1 and S2 present, no murmur, sinus tachycardia  Abdomen: Soft, mildly tender epigastric area, no guarding or rigidity, bowel sounds positive, no masses.  Extremities:  no cyanosis or clubbing,  no edema, warm to touch.  Pulses: Positive and symmetric.  Capillary refill: normal  Neurologic:  Moving all four extremities. Good strength bilaterally.   Psychological: Slightly anxious      Results from last 7 days   Lab Units 10/18/22  1147   WBC 10*3/mm3 19.68*   HEMOGLOBIN g/dL 12.0   PLATELETS 10*3/mm3 324     Results from last 7 days   Lab Units 10/18/22  1147   SODIUM mmol/L 135*   POTASSIUM mmol/L 4.4   CO2 mmol/L 20.0*   BUN mg/dL 12   CREATININE mg/dL 0.85   GLUCOSE mg/dL 91     Estimated Creatinine Clearance: 79 mL/min (by C-G formula based on SCr of 0.85 mg/dL).          Lab Results   Component Value Date    LACTATE 1.8 10/18/2022        Images:   Abdominal CT reviewed,.   IMPRESSION:     1. No acute findings in the abdomen or pelvis.  2. Sigmoid diverticulosis without evidence of acute diverticulitis.  Normal appendix.  3. Small bilateral ovarian cysts. Right ovarian cyst has diminished in  size since 03/31/2021.  4. Hysterectomy. Cholecystectomy.  5. Surgical changes in the left lower lobe. A previously described 2 cm  left lower lobe lung nodule is no longer visualized.           This report was finalized on 10/18/2022 1:34 PM by Jodie Jeffrey MD.      I reviewed the patient's results and images.     Chest x-ray shows right midlung opacity.  Left lung is clear.  Mild cardiomegaly.    EKG reviewed and showed sinus tachycardia without any acute ST changes.    Impression     Sepsis    Lactic acidosis    Class  1 obesity in adult    H/O Clostridium difficile/norovirus infection (2019)     GERD     Tobacco use    Plan/Recommendations     47-year-old female who is a active smoker smoking 1 pack/week, GERD, hypertension, previous history of C. difficile infection, previous left lower lobe nodule with negative pathology presenting with complains of fever, chills and abdominal discomfort along with nausea.  Patient states that she woke up and she was doing well and when she went to work she was found to have low-grade fever and chills.  She was advised to go to emergency room.  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  In the ER further work-up was done and patient was found to have significant leukocytosis with neutrophilia, lactic acidosis and patient was tachycardic with heart rate of 135.  She was given 2 L fluid bolus and despite that her blood pressure remains marginal so decision was made to admit to intensive care unit.  Patient was worked up with abdominal CT scan which is negative for any acute pathology.  Chest x-ray is showing right midlung opacity about patient denies any ongoing cough or sputum production.  Denies any other sick contacts.  She lives with her  who is healthy.  Denies any recent travel anywhere.  No history of blood clots.  No diarrhea or loose stools.  Denies any antibiotic use.  Denies any steroid use recently either.  Denies any headaches or vision problems or any other neurological weakness.  Denies any chest pain or angina symptoms.    1.  Admit to intensive care unit for closer hemodynamic monitoring.  2.  Patient does appear septic but source of infection is not clear.  Procalcitonin is negative.  Wondering if it is some sort of viral prodrome with atypical pneumonia.  Will send complete respiratory viral panel.  Bacterial infection cannot be ruled out given acuity of symptoms and will cover with Zosyn empirically for now.   Urinalysis negative for any infection.  Will get GI panel if she has any loose stools.  Send urinary Legionella and strep antigens as well.  We will check liver ultrasound to make sure were not missing any other pathology such as ascending cholangitis.  3.  Patient is given 2 L of fluid bolus.  I will continue lactated Ringer at 75 mL/h for next 12 hours and monitor closely.  Lactic acidosis is resolved already.   4.  GI prophylaxis and DVT prophylaxis return.  5.  Smoking cessation counseled.  6.  Trend troponin.  Will get echocardiogram to evaluate for cardiac function  7.  Check cortisol and TSH.  8.  Monitor urine output and electrolytes closely.  9.  If hemodynamically does not stabilize despite fluids will consider pressors.    Check labs in the morning including procalcitonin level and repeat chest x-ray.    This is where things stand at this point.  Further changes to the plan will be made as more data obtained.  Discussed plan of care with patient.    High level of risk due to:  illness with threat to life or bodily function.    Time spent Critical care 35 min (exclusive of procedure time)  including high complexity decision making to assess, manipulate, and support vital organ system failure in this individual who has impairment of one or more vital organ systems such that there is a high probability of imminent or life threatening deterioration in the patient’s condition.      Asher Sims MD, [facility]  Pulmonary and Critical Care Medicine  10/18/22 17:17 EDT     CC: Anastacia Jose MD    Electronically signed by Asher Sims MD at 10/18/22 4628         Lab Results (last 24 hours)     Procedure Component Value Units Date/Time    MRSA Screen, PCR (Inpatient) - Swab, Nares [[id number]]  (Normal) Collected: 10/19/22 0819    Specimen: Swab from Nares Updated: 10/19/22 1021     MRSA PCR Negative    Narrative:      The negative predictive value of this diagnostic test is high and should only be used to  consider de-escalating anti-MRSA therapy. A positive result may indicate colonization with MRSA and must be correlated clinically.  MRSA Negative    Respiratory Panel PCR w/COVID-19(SARS-CoV-2) EMIR/NEEL/GABY/PAD/COR/MAD/LEANDRA In-House, NP Swab in UTM/VTM, 3-4 HR TAT - Swab, Nasopharynx [[id number]]  (Normal) Collected: 10/19/22 0819    Specimen: Swab from Nasopharynx Updated: 10/19/22 1002     ADENOVIRUS, PCR Not Detected     Coronavirus 229E Not Detected     Coronavirus HKU1 Not Detected     Coronavirus NL63 Not Detected     Coronavirus OC43 Not Detected     COVID19 Not Detected     Human Metapneumovirus Not Detected     Human Rhinovirus/Enterovirus Not Detected     Influenza A PCR Not Detected     Influenza B PCR Not Detected     Parainfluenza Virus 1 Not Detected     Parainfluenza Virus 2 Not Detected     Parainfluenza Virus 3 Not Detected     Parainfluenza Virus 4 Not Detected     RSV, PCR Not Detected     Bordetella pertussis pcr Not Detected     Bordetella parapertussis PCR Not Detected     Chlamydophila pneumoniae PCR Not Detected     Mycoplasma pneumo by PCR Not Detected    Narrative:      In the setting of a positive respiratory panel with a viral infection PLUS a negative procalcitonin without other underlying concern for bacterial infection, consider observing off antibiotics or discontinuation of antibiotics and continue supportive care. If the respiratory panel is positive for atypical bacterial infection (Bordetella pertussis, Chlamydophila pneumoniae, or Mycoplasma pneumoniae), consider antibiotic de-escalation to target atypical bacterial infection.    Gastrointestinal Panel, PCR - Stool, Per Rectum [[id number]]  (Normal) Collected: 10/18/22 2159    Specimen: Stool from Per Rectum Updated: 10/19/22 0728     Campylobacter Not Detected     Plesiomonas shigelloides Not Detected     Salmonella Not Detected     Vibrio Not Detected     Vibrio cholerae Not Detected     Yersinia enterocolitica Not Detected      Enteroaggregative E. coli (EAEC) Not Detected     Enteropathogenic E. coli (EPEC) Not Detected     Enterotoxigenic E. coli (ETEC) lt/st Not Detected     Shiga-like toxin-producing E. coli (STEC) stx1/stx2 Not Detected     Shigella/Enteroinvasive E. coli (EIEC) Not Detected     Cryptosporidium Not Detected     Cyclospora cayetanensis Not Detected     Entamoeba histolytica Not Detected     Giardia lamblia Not Detected     Adenovirus F40/41 Not Detected     Astrovirus Not Detected     Norovirus GI/GII Not Detected     Rotavirus A Not Detected     Sapovirus (I, II, IV or V) Not Detected    Clostridioides difficile Toxin - Stool, Per Rectum [[id number]]  (Normal) Collected: 10/18/22 2159    Specimen: Stool from Per Rectum Updated: 10/19/22 0656    Narrative:      The following orders were created for panel order Clostridioides difficile Toxin - Stool, Per Rectum.  Procedure                               Abnormality         Status                     ---------                               -----------         ------                     Clostridioides difficile...[[id number]]  Normal              Final result                 Please view results for these tests on the individual orders.    Clostridioides difficile Toxin, PCR - Stool, Per Rectum [[id number]]  (Normal) Collected: 10/18/22 2159    Specimen: Stool from Per Rectum Updated: 10/19/22 0656     C. Difficile Toxins by PCR Not Detected    Narrative:      The result indicates the absence of toxigenic C. difficile from stool specimen.     Comprehensive Metabolic Panel [[id number]]  (Abnormal) Collected: 10/19/22 0225    Specimen: Blood Updated: 10/19/22 0258     Glucose 104 mg/dL      BUN 10 mg/dL      Creatinine 0.73 mg/dL      Sodium 132 mmol/L      Potassium 3.7 mmol/L      Chloride 101 mmol/L      CO2 21.0 mmol/L      Calcium 8.4 mg/dL      Total Protein 6.1 g/dL      Albumin 3.40 g/dL      ALT (SGPT) 37 U/L      AST (SGOT) 43 U/L      Alkaline Phosphatase 61 U/L       Total Bilirubin 0.6 mg/dL      Globulin 2.7 gm/dL      Comment: Calculated Result        A/G Ratio 1.3 g/dL      BUN/Creatinine Ratio 13.7     Anion Gap 10.0 mmol/L      eGFR 101.6 mL/min/1.73      Comment: National Kidney Foundation and American Society of Nephrology (ASN) Task Force recommended calculation based on the Chronic Kidney Disease Epidemiology Collaboration (CKD-EPI) equation refit without adjustment for race.       Narrative:      GFR Normal >60  Chronic Kidney Disease <60  Kidney Failure <15      Magnesium [[id number]]  (Abnormal) Collected: 10/19/22 0225    Specimen: Blood Updated: 10/19/22 0258     Magnesium 1.5 mg/dL     Phosphorus [[id number]]  (Normal) Collected: 10/19/22 0225    Specimen: Blood Updated: 10/19/22 0258     Phosphorus 4.3 mg/dL     Troponin [[id number]]  (Normal) Collected: 10/19/22 0225    Specimen: Blood Updated: 10/19/22 0257     Troponin T <0.010 ng/mL     Narrative:      Troponin T Reference Range:  <= 0.03 ng/mL-   Negative for AMI  >0.03 ng/mL-     Abnormal for myocardial necrosis.  Clinicians would have to utilize clinical acumen, EKG, Troponin and serial changes to determine if it is an Acute Myocardial Infarction or myocardial injury due to an underlying chronic condition.       Results may be falsely decreased if patient taking Biotin.      Lactic Acid, Plasma [[id number]]  (Normal) Collected: 10/19/22 0225    Specimen: Blood Updated: 10/19/22 0254     Lactate 1.2 mmol/L      Comment: Falsely depressed results may occur on samples drawn from patients receiving N-Acetylcysteine (NAC) or Metamizole.       CBC & Differential [[id number]]  (Abnormal) Collected: 10/19/22 0225    Specimen: Blood Updated: 10/19/22 0236    Narrative:      The following orders were created for panel order CBC & Differential.  Procedure                               Abnormality         Status                     ---------                               -----------         ------                     CBC  Auto Differential[[id number]]        Abnormal            Final result                 Please view results for these tests on the individual orders.    CBC Auto Differential [[id number]]  (Abnormal) Collected: 10/19/22 0225    Specimen: Blood Updated: 10/19/22 0236     WBC 27.75 10*3/mm3      RBC 3.30 10*6/mm3      Hemoglobin 10.3 g/dL      Hematocrit 30.9 %      MCV 93.6 fL      MCH 31.2 pg      MCHC 33.3 g/dL      RDW 13.5 %      RDW-SD 46.2 fl      MPV 8.8 fL      Platelets 258 10*3/mm3      Neutrophil % 83.3 %      Lymphocyte % 10.7 %      Monocyte % 4.7 %      Eosinophil % 0.1 %      Basophil % 0.2 %      Immature Grans % 1.0 %      Neutrophils, Absolute 23.13 10*3/mm3      Lymphocytes, Absolute 2.97 10*3/mm3      Monocytes, Absolute 1.30 10*3/mm3      Eosinophils, Absolute 0.02 10*3/mm3      Basophils, Absolute 0.06 10*3/mm3      Immature Grans, Absolute 0.27 10*3/mm3      nRBC 0.0 /100 WBC     S. Pneumo Ag Urine or CSF - Urine, Urine, Clean Catch [[id number]]  (Normal) Collected: 10/18/22 1757    Specimen: Urine, Clean Catch Updated: 10/19/22 0026     Strep Pneumo Ag Negative    Legionella Antigen, Urine - Urine, Urine, Clean Catch [[id number]]  (Normal) Collected: 10/18/22 1746    Specimen: Urine, Clean Catch Updated: 10/19/22 0025     LEGIONELLA ANTIGEN, URINE Negative    Urine Drug Screen - Urine, Clean Catch [[id number]]  (Abnormal) Collected: 10/18/22 1746    Specimen: Urine, Clean Catch Updated: 10/18/22 1823     THC, Screen, Urine Negative     Phencyclidine (PCP), Urine Negative     Cocaine Screen, Urine Negative     Methamphetamine, Ur Negative     Opiate Screen Negative     Amphetamine Screen, Urine Negative     Benzodiazepine Screen, Urine Negative     Tricyclic Antidepressants Screen Positive     Methadone Screen, Urine Negative     Barbiturates Screen, Urine Negative     Oxycodone Screen, Urine Negative     Propoxyphene Screen Negative     Buprenorphine, Screen, Urine Negative    Narrative:      Cutoff  For Drugs Screened:    Amphetamines               500 ng/ml  Barbiturates               200 ng/ml  Benzodiazepines            150 ng/ml  Cocaine                    150 ng/ml  Methadone                  200 ng/ml  Opiates                    100 ng/ml  Phencyclidine               25 ng/ml  THC                            50 ng/ml  Methamphetamine            500 ng/ml  Tricyclic Antidepressants  300 ng/ml  Oxycodone                  100 ng/ml  Propoxyphene               300 ng/ml  Buprenorphine               10 ng/ml    The normal value for all drugs tested is negative. This report includes unconfirmed screening results, with the cutoff values listed, to be used for medical treatment purposes only.  Unconfirmed results must not be used for non-medical purposes such as employment or legal testing.  Clinical consideration should be applied to any drug of abuse test, particularly when unconfirmed results are used.      Troponin [[id number]]  (Normal) Collected: 10/18/22 1652    Specimen: Blood Updated: 10/18/22 1721     Troponin T <0.010 ng/mL     Narrative:      Troponin T Reference Range:  <= 0.03 ng/mL-   Negative for AMI  >0.03 ng/mL-     Abnormal for myocardial necrosis.  Clinicians would have to utilize clinical acumen, EKG, Troponin and serial changes to determine if it is an Acute Myocardial Infarction or myocardial injury due to an underlying chronic condition.       Results may be falsely decreased if patient taking Biotin.      Cortisol [[id number]] Collected: 10/18/22 1147    Specimen: Blood Updated: 10/18/22 1718     Cortisol 31.45 mcg/dL     Narrative:      Cortisol Reference Ranges:    Cortisol 6AM - 10AM Range: 6.02-18.40 mcg/dl  Cortisol 4PM - 8PM Range: 2.68-10.50 mcg/dl      Results may be falsely increased if patient taking Biotin.      Ethanol [[id number]]  (Normal) Collected: 10/18/22 1652    Specimen: Blood Updated: 10/18/22 1718     Ethanol <10 mg/dL     Narrative:      Elevated lactic acid  concentration and lactate dehydrogenase(LD) activity may falsely elevate enzymatically determined ethanol levels. Not for legal purposes.     Blood Culture - Blood, Arm, Right [[id number]] Collected: 10/18/22 1500    Specimen: Blood from Arm, Right Updated: 10/18/22 1653    Blood Culture - Blood, Arm, Left [[id number]] Collected: 10/18/22 1625    Specimen: Blood from Arm, Left Updated: 10/18/22 1653    TSH [[id number]]  (Normal) Collected: 10/18/22 1147    Specimen: Blood Updated: 10/18/22 1648     TSH 1.650 uIU/mL     Amylase [[id number]]  (Normal) Collected: 10/18/22 1147    Specimen: Blood Updated: 10/18/22 1638     Amylase 63 U/L     [facility] Draw [[id number]] Collected: 10/18/22 1147    Specimen: Blood Updated: 10/18/22 1600    Narrative:      The following orders were created for panel order [facility] Draw.  Procedure                               Abnormality         Status                     ---------                               -----------         ------                     Green Top (Gel)[[id number]]                                  Final result               Lavender Top[[id number]]                                     Final result               Gold Top - SST[[id number]]                                   Final result               Gray Top[[id number]]                                         Final result               Light Blue Top[[id number]]                                   Final result                 Please view results for these tests on the individual orders.    Gray Top [[id number]] Collected: 10/18/22 1147    Specimen: Blood Updated: 10/18/22 1600     Extra Tube Hold for add-ons.     Comment: Auto resulted.       STAT Lactic Acid, Reflex [[id number]]  (Normal) Collected: 10/18/22 1500    Specimen: Blood Updated: 10/18/22 1536     Lactate 1.8 mmol/L      Comment: Falsely depressed results may occur on samples drawn from patients receiving N-Acetylcysteine (NAC) or Metamizole.       Procalcitonin [[id number]]   "(Normal) Collected: 10/18/22 1147    Specimen: Blood Updated: 10/18/22 1403     Procalcitonin 0.05 ng/mL     Narrative:      As a Marker for Sepsis (Non-Neonates):    1. <0.5 ng/mL represents a low risk of severe sepsis and/or septic shock.  2. >2 ng/mL represents a high risk of severe sepsis and/or septic shock.    As a Marker for Lower Respiratory Tract Infections that require antibiotic therapy:    PCT on Admission    Antibiotic Therapy       6-12 Hrs later    >0.5                Strongly Recommended  >0.25 - <0.5        Recommended   0.1 - 0.25          Discouraged              Remeasure/reassess PCT  <0.1                Strongly Discouraged     Remeasure/reassess PCT    As 28 day mortality risk marker: \"Change in Procalcitonin Result\" (>80% or <=80%) if Day 0 (or Day 1) and Day 4 values are available. Refer to http://[email or web address]pct-calculator.[email or web address]    Change in PCT <=80%  A decrease of PCT levels below or equal to 80% defines a positive change in PCT test result representing a higher risk for 28-day all-cause mortality of patients diagnosed with severe sepsis for septic shock.    Change in PCT >80%  A decrease of PCT levels of more than 80% defines a negative change in PCT result representing a lower risk for 28-day all-cause mortality of patients diagnosed with severe sepsis or septic shock.       COVID PRE-OP / PRE-PROCEDURE SCREENING ORDER (NO ISOLATION) - Swab, Nasal Cavity [[id number]]  (Normal) Collected: 10/18/22 1235    Specimen: Swab from Nasal Cavity Updated: 10/18/22 1308    Narrative:      The following orders were created for panel order COVID PRE-OP / PRE-PROCEDURE SCREENING ORDER (NO ISOLATION) - Swab, Nasal Cavity.  Procedure                               Abnormality         Status                     ---------                               -----------         ------                     COVID-19 and FLU A/B PCR...[[id number]]  Normal              Final result                 Please view results for " these tests on the individual orders.    COVID-19 and FLU A/B PCR - Swab, Nasopharynx [[id number]]  (Normal) Collected: 10/18/22 1235    Specimen: Swab from Nasopharynx Updated: 10/18/22 1308     COVID19 Not Detected     Influenza A PCR Not Detected     Influenza B PCR Not Detected    Narrative:      Fact sheet for providers: https://www.fda.gov/media/438418/download    Fact sheet for patients: https://www.fda.gov/media/865537/download    Test performed by PCR.    Green Top (Gel) [[id number]] Collected: 10/18/22 1147    Specimen: Blood Updated: 10/18/22 1300     Extra Tube Hold for add-ons.     Comment: Auto resulted.       Gold Top - SST [[id number]] Collected: 10/18/22 1147    Specimen: Blood Updated: 10/18/22 1300     Extra Tube Hold for add-ons.     Comment: Auto resulted.       Light Blue Top [[id number]] Collected: 10/18/22 1147    Specimen: Blood Updated: 10/18/22 1300     Extra Tube Hold for add-ons.     Comment: Auto resulted       Lavender Top [[id number]] Collected: 10/18/22 1147    Specimen: Blood Updated: 10/18/22 1300     Extra Tube hold for add-on     Comment: Auto resulted       Lactic Acid, Plasma [[id number]]  (Abnormal) Collected: 10/18/22 1147    Specimen: Blood Updated: 10/18/22 1246     Lactate 3.7 mmol/L      Comment: Falsely depressed results may occur on samples drawn from patients receiving N-Acetylcysteine (NAC) or Metamizole.       Comprehensive Metabolic Panel [[id number]]  (Abnormal) Collected: 10/18/22 1147    Specimen: Blood Updated: 10/18/22 1219     Glucose 91 mg/dL      BUN 12 mg/dL      Creatinine 0.85 mg/dL      Sodium 135 mmol/L      Potassium 4.4 mmol/L      Comment: Slight hemolysis detected by analyzer. Results may be affected.        Chloride 100 mmol/L      CO2 20.0 mmol/L      Calcium 9.8 mg/dL      Total Protein 7.3 g/dL      Albumin 4.40 g/dL      ALT (SGPT) 27 U/L      AST (SGOT) 23 U/L      Alkaline Phosphatase 73 U/L      Total Bilirubin 0.4 mg/dL      Globulin 2.9 gm/dL       Comment: Calculated Result        A/G Ratio 1.5 g/dL      BUN/Creatinine Ratio 14.1     Anion Gap 15.0 mmol/L      eGFR 84.6 mL/min/1.73      Comment: National Kidney Foundation and American Society of Nephrology (ASN) Task Force recommended calculation based on the Chronic Kidney Disease Epidemiology Collaboration (CKD-EPI) equation refit without adjustment for race.       Narrative:      GFR Normal >60  Chronic Kidney Disease <60  Kidney Failure <15      Lipase [[id number]]  (Normal) Collected: 10/18/22 1147    Specimen: Blood Updated: 10/18/22 1219     Lipase 32 U/L     Urinalysis, Microscopic Only - Urine, Clean Catch [[id number]]  (Abnormal) Collected: 10/18/22 1137    Specimen: Urine, Clean Catch Updated: 10/18/22 1211     RBC, UA 0-2 /HPF      WBC, UA 0-2 /HPF      Bacteria, UA None Seen /HPF      Squamous Epithelial Cells, UA 3-6 /HPF      Hyaline Casts, UA 0-6 /LPF      Mucus, UA Trace /HPF      Methodology Manual Light Microscopy    Narrative:      Microscopic performed on unspun specimen.      Urinalysis With Microscopic If Indicated (No Culture) - Urine, Clean Catch [[id number]]  (Abnormal) Collected: 10/18/22 1137    Specimen: Urine, Clean Catch Updated: 10/18/22 1208     Color, UA Yellow     Appearance, UA Clear     pH, UA 5.5     Specific Gravity, UA >=1.030     Glucose, UA Negative     Ketones, UA Negative     Bilirubin, UA Negative     Blood, UA Moderate (2+)     Protein, UA Negative     Leuk Esterase, UA Negative     Nitrite, UA Negative     Urobilinogen, UA 0.2 E.U./dL    CBC & Differential [[id number]]  (Abnormal) Collected: 10/18/22 1147    Specimen: Blood Updated: 10/18/22 1159    Narrative:      The following orders were created for panel order CBC & Differential.  Procedure                               Abnormality         Status                     ---------                               -----------         ------                     CBC Auto Differential[[id number]]        Abnormal             Final result                 Please view results for these tests on the individual orders.    CBC Auto Differential [[id number]]  (Abnormal) Collected: 10/18/22 1147    Specimen: Blood Updated: 10/18/22 1159     WBC 19.68 10*3/mm3      RBC 3.89 10*6/mm3      Hemoglobin 12.0 g/dL      Hematocrit 35.9 %      MCV 92.3 fL      MCH 30.8 pg      MCHC 33.4 g/dL      RDW 13.2 %      RDW-SD 44.3 fl      MPV 8.9 fL      Platelets 324 10*3/mm3      Neutrophil % 88.0 %      Lymphocyte % 7.0 %      Monocyte % 3.9 %      Eosinophil % 0.3 %      Basophil % 0.2 %      Immature Grans % 0.6 %      Neutrophils, Absolute 17.33 10*3/mm3      Lymphocytes, Absolute 1.37 10*3/mm3      Monocytes, Absolute 0.77 10*3/mm3      Eosinophils, Absolute 0.06 10*3/mm3      Basophils, Absolute 0.04 10*3/mm3      Immature Grans, Absolute 0.11 10*3/mm3      nRBC 0.0 /100 WBC         Imaging Results (Last 24 Hours)     Procedure Component Value Units Date/Time    XR Chest 1 View [[id number]] Collected: 10/19/22 0709     Updated: 10/19/22 0713    Narrative:      Examination: XR CHEST 1 VW-     Date of Exam: 10/19/2022 3:48 AM     Indication: sepsis; A41.9-Sepsis, unspecified organism; R65.21-Severe  sepsis with septic shock.     Comparison: Radiograph 10/18/2022, 06/16/2022     Technique: 1 view of the chest      Findings:  An airspace consolidation is present within the right midlung,  progressed as compared to the previous study. This demonstrates more of  a triangle configuration extensive periphery on today's exam.. No  pleural effusions. No pneumothorax. The heart size is normal. The  pulmonary vasculature appears within normal limits. No acute osseous  abnormality identified.       Impression:      Right midlung airspace consolidation, or pronounced as compared to the  previous study with extension to the periphery with a triangular  configuration, likely related to pneumonia and atelectasis. Pulmonary  infarct or mass cannot be excluded. Contrasted  CT evaluation may be  warranted.     This report was finalized on 10/19/2022 7:10 AM by Tanya Traore MD.       US Liver [[id number]] Collected: 10/18/22 1830     Updated: 10/18/22 1835    Narrative:      DATE OF EXAM: 10/18/2022 5:39 PM     PROCEDURE: US LIVER-     INDICATIONS: RUQ pain; A41.9-Sepsis, unspecified organism; R65.21-Severe  sepsis with septic shock     COMPARISON: CT earlier the same day     TECHNIQUE: Grayscale and color Doppler ultrasound evaluation of the  limited abdomen was performed.     FINDINGS:  Partially visualized pancreas within normal limits.     Moderate diffuse increased echogenicity of the liver parenchyma  compatible with steatosis. No biliary ductal dilatation or suspicious  focal hepatic lesion. The portal and visualized hepatic veins  demonstrate patency and appropriate directionality.     Prior cholecystectomy.     Unremarkable 5 mm common bile duct.     The right kidney measures 9.0 cm in length without apparent mass or  hydronephrosis. Normal color Doppler flow.       Impression:      Moderate diffuse hepatic steatosis. No acute findings in the  right upper quadrant otherwise.     This report was finalized on 10/18/2022 6:32 PM by Cl Gamboa.       XR Chest 1 View [[id number]] Collected: 10/18/22 1700     Updated: 10/18/22 1705    Narrative:      DATE OF EXAM: 10/18/2022 4:41 PM     PROCEDURE: XR CHEST 1 VW-     INDICATIONS: Sepsis; A41.9-Sepsis, unspecified organism; R65.21-Severe  sepsis with septic shock     COMPARISON: June 16, 2022     TECHNIQUE: Single radiographic AP view of the chest was obtained.     FINDINGS:  There is an opacity within the right upper lobe that may be secondary to  a rounded pneumonia. This has developed since June. The left lung is  clear. The heart is enlarged. There are no pleural effusions.        Impression:      1.  Rounded opacity within the right lung that may be secondary to  pneumonia. Follow-up after routine medical therapy recommended  to  document resolution.     This report was finalized on 10/18/2022 5:02 PM by Marv Mitchell MD.       CT Abdomen Pelvis With Contrast [[id number]] Collected: 10/18/22 1324     Updated: 10/18/22 1337    Narrative:      CT ABDOMEN PELVIS W CONTRAST-     Date of Exam: 10/18/2022 1:08 PM     Indication: DIFFUSE ABD PAIN, FEVER, VOMITING.     Comparison: CT abdomen and pelvis 03/31/2021.     Technique: Contiguous axial CT images were obtained from the lung bases  to the pubic symphysis following uneventful administration of 95 cc  Isovue-300 intravenous and oral contrast. Sagittal and coronal  reconstructions were performed.  Automated exposure control and  iterative reconstruction methods were used.     FINDINGS:     Scattered diverticular changes are present in the colon, without  indication of acute diverticulitis. The previously described features of  sigmoid diverticulitis on the prior examination, are not present on the  current study. The appendix is normal. The bowel does not appear  abnormal and thickened, dilated or inflamed. There is no evidence of  bowel obstruction.     Cholecystectomy changes are present. There is no abnormal biliary ductal  dilation. A 4 mm cyst is seen in the right hepatic lobe. The spleen,  pancreas, adrenals, and kidneys are within normal limits. A tiny  probable cyst in the posterior left mid kidney measures 3 mm. There is  mild aortic calcific atherosclerosis without aneurysm. No pathologically  enlarged lymph nodes are seen and there is no ascites.     The uterus is surgically absent. A 12 mm left ovarian cyst is seen. A 13  mm right ovarian cyst has significantly diminished in size, previously  measuring, in keeping with benign finding. The urinary bladder and  rectum are normal.     The lung bases are free of acute airspace disease. A previously  described 2 cm left lower lobe lung nodule is presumably surgically  absent, with new surgical chain sutures in the medial left lower  lobe.          Impression:         1. No acute findings in the abdomen or pelvis.  2. Sigmoid diverticulosis without evidence of acute diverticulitis.  Normal appendix.  3. Small bilateral ovarian cysts. Right ovarian cyst has diminished in  size since 03/31/2021.  4. Hysterectomy. Cholecystectomy.  5. Surgical changes in the left lower lobe. A previously described 2 cm  left lower lobe lung nodule is no longer visualized.           This report was finalized on 10/18/2022 1:34 PM by Jodie Jeffrey MD.           Physician Progress Notes (last 24 hours)  Notes from 10/18/22 1023 through 10/19/22 1023   No notes of this type exist for this encounter.         Consult Notes (last 24 hours)  Notes from 10/18/22 1023 through 10/19/22 1023   No notes of this type exist for this encounter.

## 2022-10-20 VITALS
TEMPERATURE: 98.3 F | SYSTOLIC BLOOD PRESSURE: 117 MMHG | WEIGHT: 154.32 LBS | DIASTOLIC BLOOD PRESSURE: 88 MMHG | OXYGEN SATURATION: 98 % | HEART RATE: 87 BPM | RESPIRATION RATE: 18 BRPM | BODY MASS INDEX: 28.4 KG/M2 | HEIGHT: 62 IN

## 2022-10-20 LAB
ANION GAP SERPL CALCULATED.3IONS-SCNC: 9 MMOL/L (ref 5–15)
BASOPHILS # BLD AUTO: 0.03 10*3/MM3 (ref 0–0.2)
BASOPHILS NFR BLD AUTO: 0.2 % (ref 0–1.5)
BUN SERPL-MCNC: 7 MG/DL (ref 6–20)
BUN/CREAT SERPL: 10.6 (ref 7–25)
CALCIUM SPEC-SCNC: 8.7 MG/DL (ref 8.6–10.5)
CHLORIDE SERPL-SCNC: 102 MMOL/L (ref 98–107)
CO2 SERPL-SCNC: 22 MMOL/L (ref 22–29)
CREAT SERPL-MCNC: 0.66 MG/DL (ref 0.57–1)
DEPRECATED RDW RBC AUTO: 46 FL (ref 37–54)
EGFRCR SERPLBLD CKD-EPI 2021: 108.4 ML/MIN/1.73
EOSINOPHIL # BLD AUTO: 0.13 10*3/MM3 (ref 0–0.4)
EOSINOPHIL NFR BLD AUTO: 0.8 % (ref 0.3–6.2)
ERYTHROCYTE [DISTWIDTH] IN BLOOD BY AUTOMATED COUNT: 13.4 % (ref 12.3–15.4)
GLUCOSE SERPL-MCNC: 108 MG/DL (ref 65–99)
HCT VFR BLD AUTO: 27 % (ref 34–46.6)
HGB BLD-MCNC: 8.9 G/DL (ref 12–15.9)
IMM GRANULOCYTES # BLD AUTO: 0.07 10*3/MM3 (ref 0–0.05)
IMM GRANULOCYTES NFR BLD AUTO: 0.5 % (ref 0–0.5)
LYMPHOCYTES # BLD AUTO: 2.43 10*3/MM3 (ref 0.7–3.1)
LYMPHOCYTES NFR BLD AUTO: 15.9 % (ref 19.6–45.3)
MAGNESIUM SERPL-MCNC: 1.9 MG/DL (ref 1.6–2.6)
MCH RBC QN AUTO: 30.7 PG (ref 26.6–33)
MCHC RBC AUTO-ENTMCNC: 33 G/DL (ref 31.5–35.7)
MCV RBC AUTO: 93.1 FL (ref 79–97)
MONOCYTES # BLD AUTO: 0.67 10*3/MM3 (ref 0.1–0.9)
MONOCYTES NFR BLD AUTO: 4.4 % (ref 5–12)
NEUTROPHILS NFR BLD AUTO: 11.97 10*3/MM3 (ref 1.7–7)
NEUTROPHILS NFR BLD AUTO: 78.2 % (ref 42.7–76)
NRBC BLD AUTO-RTO: 0 /100 WBC (ref 0–0.2)
PLATELET # BLD AUTO: 241 10*3/MM3 (ref 140–450)
PMV BLD AUTO: 9.1 FL (ref 6–12)
POTASSIUM SERPL-SCNC: 3.5 MMOL/L (ref 3.5–5.2)
PROCALCITONIN SERPL-MCNC: 1.5 NG/ML (ref 0–0.25)
RBC # BLD AUTO: 2.9 10*6/MM3 (ref 3.77–5.28)
SODIUM SERPL-SCNC: 133 MMOL/L (ref 136–145)
WBC NRBC COR # BLD: 15.3 10*3/MM3 (ref 3.4–10.8)

## 2022-10-20 PROCEDURE — 25010000002 PIPERACILLIN SOD-TAZOBACTAM PER 1 G: Performed by: INTERNAL MEDICINE

## 2022-10-20 PROCEDURE — 25010000002 HEPARIN (PORCINE) PER 1000 UNITS: Performed by: INTERNAL MEDICINE

## 2022-10-20 PROCEDURE — 83735 ASSAY OF MAGNESIUM: CPT | Performed by: INTERNAL MEDICINE

## 2022-10-20 PROCEDURE — 0 MAGNESIUM SULFATE 4 GM/100ML SOLUTION: Performed by: NURSE PRACTITIONER

## 2022-10-20 PROCEDURE — 80048 BASIC METABOLIC PNL TOTAL CA: CPT

## 2022-10-20 PROCEDURE — 85025 COMPLETE CBC W/AUTO DIFF WBC: CPT

## 2022-10-20 PROCEDURE — 84145 PROCALCITONIN (PCT): CPT

## 2022-10-20 PROCEDURE — 99239 HOSP IP/OBS DSCHRG MGMT >30: CPT

## 2022-10-20 RX ORDER — POTASSIUM CHLORIDE 1.5 G/1.77G
40 POWDER, FOR SOLUTION ORAL AS NEEDED
Status: DISCONTINUED | OUTPATIENT
Start: 2022-10-20 | End: 2022-10-20 | Stop reason: HOSPADM

## 2022-10-20 RX ORDER — AMOXICILLIN AND CLAVULANATE POTASSIUM 875; 125 MG/1; MG/1
1 TABLET, FILM COATED ORAL EVERY 12 HOURS SCHEDULED
Status: DISCONTINUED | OUTPATIENT
Start: 2022-10-20 | End: 2022-10-20 | Stop reason: HOSPADM

## 2022-10-20 RX ORDER — POTASSIUM CHLORIDE 750 MG/1
40 CAPSULE, EXTENDED RELEASE ORAL AS NEEDED
Status: DISCONTINUED | OUTPATIENT
Start: 2022-10-20 | End: 2022-10-20 | Stop reason: HOSPADM

## 2022-10-20 RX ORDER — DOXYCYCLINE 100 MG/1
100 CAPSULE ORAL EVERY 12 HOURS SCHEDULED
Status: DISCONTINUED | OUTPATIENT
Start: 2022-10-20 | End: 2022-10-20 | Stop reason: HOSPADM

## 2022-10-20 RX ORDER — AMOXICILLIN AND CLAVULANATE POTASSIUM 875; 125 MG/1; MG/1
1 TABLET, FILM COATED ORAL EVERY 12 HOURS SCHEDULED
Qty: 13 TABLET | Refills: 0 | Status: SHIPPED | OUTPATIENT
Start: 2022-10-20 | End: 2022-10-27

## 2022-10-20 RX ORDER — POTASSIUM CHLORIDE 7.45 MG/ML
10 INJECTION INTRAVENOUS
Status: DISCONTINUED | OUTPATIENT
Start: 2022-10-20 | End: 2022-10-20 | Stop reason: HOSPADM

## 2022-10-20 RX ORDER — SUMATRIPTAN 50 MG/1
50 TABLET, FILM COATED ORAL
Status: DISCONTINUED | OUTPATIENT
Start: 2022-10-20 | End: 2022-10-20 | Stop reason: HOSPADM

## 2022-10-20 RX ORDER — DOXYCYCLINE 100 MG/1
100 CAPSULE ORAL EVERY 12 HOURS SCHEDULED
Qty: 26 CAPSULE | Refills: 0 | Status: CANCELLED | OUTPATIENT
Start: 2022-10-20 | End: 2022-11-02

## 2022-10-20 RX ORDER — DOXYCYCLINE 100 MG/1
100 CAPSULE ORAL EVERY 12 HOURS SCHEDULED
Qty: 13 CAPSULE | Refills: 0 | Status: SHIPPED | OUTPATIENT
Start: 2022-10-20 | End: 2022-10-26

## 2022-10-20 RX ORDER — CHOLECALCIFEROL (VITAMIN D3) 125 MCG
5 CAPSULE ORAL ONCE
Status: COMPLETED | OUTPATIENT
Start: 2022-10-20 | End: 2022-10-20

## 2022-10-20 RX ADMIN — DOXYCYCLINE 100 MG: 100 INJECTION, POWDER, LYOPHILIZED, FOR SOLUTION INTRAVENOUS at 09:05

## 2022-10-20 RX ADMIN — DESVENLAFAXINE SUCCINATE 100 MG: 50 TABLET, EXTENDED RELEASE ORAL at 09:05

## 2022-10-20 RX ADMIN — MAGNESIUM SULFATE HEPTAHYDRATE 4 G: 40 INJECTION, SOLUTION INTRAVENOUS at 06:07

## 2022-10-20 RX ADMIN — POTASSIUM CHLORIDE 40 MEQ: 750 CAPSULE, EXTENDED RELEASE ORAL at 06:06

## 2022-10-20 RX ADMIN — ACETAMINOPHEN 650 MG: 325 TABLET, FILM COATED ORAL at 06:06

## 2022-10-20 RX ADMIN — TAZOBACTAM SODIUM AND PIPERACILLIN SODIUM 3.38 G: 375; 3 INJECTION, SOLUTION INTRAVENOUS at 00:17

## 2022-10-20 RX ADMIN — HEPARIN SODIUM 5000 UNITS: 5000 INJECTION INTRAVENOUS; SUBCUTANEOUS at 06:06

## 2022-10-20 RX ADMIN — AMOXICILLIN AND CLAVULANATE POTASSIUM 1 TABLET: 875; 125 TABLET, FILM COATED ORAL at 10:17

## 2022-10-20 RX ADMIN — SUMATRIPTAN SUCCINATE 50 MG: 50 TABLET ORAL at 10:17

## 2022-10-20 RX ADMIN — PANTOPRAZOLE SODIUM 40 MG: 40 TABLET, DELAYED RELEASE ORAL at 06:06

## 2022-10-20 RX ADMIN — Medication 5 MG: at 01:15

## 2022-10-20 NOTE — PLAN OF CARE
Goal Outcome Evaluation:    Neuro- A/O x4 pleasant. Follows commands. PERRLA.    Resp- Sats 93-97% RA resp 18. Lungs sounds diminished but clear.    Cardiac- NSR HR-70's-80's. B/'s/50-70's.    GI/- continent of bowel and bladder. Up to BSC with assist of one.

## 2022-10-20 NOTE — DISCHARGE SUMMARY
Discharge Summary    Patient name: Mishel Freeman  CSN: [id number]  MRN: [id number]  : 1975  Today's date: 10/20/2022     Date of Admission: 10/18/2022  Date of Discharge:  10/20/2022    Admitting Physician:  Asher Sims MD   Primary Care Provider: Anastacia Jose MD  Consultations:   None    Admission Diagnosis:   Sepsis    Discharge Diagnoses:   Active Hospital Problems    Diagnosis    • **Sepsis    • Septic shock (HCC)    • Class 1 obesity in adult    • H/O Clostridium difficile/norovirus infection (2019)     • GERD     • Tobacco use    • Lactic acidosis        Allergies:  Macrobid [nitrofurantoin macrocrystal]    Code Status and Medical Interventions:   Ordered at: 10/18/22 1726     Level Of Support Discussed With:    Patient     Code Status (Patient has no pulse and is not breathing):    CPR (Attempt to Resuscitate)     Medical Interventions (Patient has pulse or is breathing):    Full Support       Procedures/Testing:  CT Abdomen Pelvis With Contrast    Result Date: 10/18/2022   1. No acute findings in the abdomen or pelvis. 2. Sigmoid diverticulosis without evidence of acute diverticulitis. Normal appendix. 3. Small bilateral ovarian cysts. Right ovarian cyst has diminished in size since 2021. 4. Hysterectomy. Cholecystectomy. 5. Surgical changes in the left lower lobe. A previously described 2 cm left lower lobe lung nodule is no longer visualized.    This report was finalized on 10/18/2022 1:34 PM by Jodie Jeffrey MD.      US Liver    Result Date: 10/18/2022  Moderate diffuse hepatic steatosis. No acute findings in the right upper quadrant otherwise.  This report was finalized on 10/18/2022 6:32 PM by Cl Gamboa.      XR Chest 1 View    Result Date: 10/19/2022  Right midlung airspace consolidation, or pronounced as compared to the previous study with extension to the periphery with a triangular configuration, likely related to pneumonia and atelectasis. Pulmonary  infarct or mass cannot be excluded. Contrasted CT evaluation may be warranted.  This report was finalized on 10/19/2022 7:10 AM by Tanya Traore MD.      XR Chest 1 View    Result Date: 10/18/2022  1.  Rounded opacity within the right lung that may be secondary to pneumonia. Follow-up after routine medical therapy recommended to document resolution.  This report was finalized on 10/18/2022 5:02 PM by Marv Mitchell MD.      CT Angiogram Chest    Result Date: 10/19/2022   1. Right upper lobe pneumonia. 2. No evidence of pulmonary embolism. 3. Bronchi are clear.    This report was finalized on 10/19/2022 2:35 PM by Serg Malone MD.           History of Present Illness: (Copied from H&P 10/18)  Mishel Freeman is a 47 y.o. female smoker with PMH of GERD, HTN, C-diff infection, and LLL nodule s/p EBUS (negative) who presents to [facility] ED on 10/18/22 with abdominal pain and hypotension.      The patient denotes nausea, vomiting, myalgias, weakness, dizziness, and abdominal pain that started this morning with low grade fever. CT A/P unrevealing for acute process. Labs do show a LA of 3.7 and WBC of 19, however PCT is 0.05. UA unrevealing for UTI and CXR currently pending. BP marginal in the 80-90s despite 2L NS and given empiric Zosyn. She will be admitted to the ICU for higher level of care.      She has been evaluated by Dr. Godwin in the past for abdominal pain, bloating, and nausea/vomiting. Biopsies were negative and celiac panel was unrevealing. In 2019 she required admission for abdominal pain and CT imaging was negative for small bowel obstruction or internal hernia but did show enteritis. She was found to be C. Diff positive treated with PO Vanc per ID as well as norovirus.   (End of copied text)    Hospital Course:  Ms. Freeman was admitted to the ICU secondary to the problems above. She was on norepinephrine for BP support for a few hours. Abdominal CT scan negative. Liver ultrasound showed mild hepatic  "steatosis without any other pathology. Initial chest x-ray showed some groundglass opacities in the RUL without any cough, sputum, or CP. Repeat x-ray showed worsening consolidation. CTA showed RUL pneumonia. Patient was started on antibiotics and felt better. She required 3L NC for a short period without vasopressors for the duration of her stay.    It has been determined that Ms. Freeman has reached the maximum benefit of her inpatient stay. She is hemodynamically stable, remained on RA for 24 hours,  tolerating her oral medications and an oral diet.     Vitals:  /75   Pulse 71   Temp 98.3 °F (36.8 °C) (Oral)   Resp 18   Ht 157.5 cm (62\")   Wt 70 kg (154 lb 5.2 oz)   SpO2 99%   BMI 28.23 kg/m²     Physical Exam:   Constitutional:  Appears well-developed and well-nourished. No distress.   HEENT:  Normocephalic and atraumatic. PERRL  Neck:  Neck supple. No JVD present.   CV: Normal rate, regular rhythm,  intact distal pulses.  No gallop, murmur or rub.  Pulmonary/Chest: Effort normal and breath sounds normal. No respiratory distress. No wheezes, rhonchi or rales.   Abdominal: Soft. +BS. No distension and no mass. There is no tenderness.   Musculoskeletal: Normal muscle tone and strength  Neurological: Alert and oriented to person, place, and time.  No focal deficits  Skin: Skin is warm and dry. No rash noted.   Extremities:  No clubbing, edema or cyanosis  Psychiatric: Normal mood and affect. Behavior is normal.     Labs:  Results from last 7 days   Lab Units 10/20/22  0414   WBC 10*3/mm3 15.30*   HEMOGLOBIN g/dL 8.9*   HEMATOCRIT % 27.0*   PLATELETS 10*3/mm3 241     Results from last 7 days   Lab Units 10/20/22  0414 10/19/22  0225   SODIUM mmol/L 133* 132*   POTASSIUM mmol/L 3.5 3.7   CHLORIDE mmol/L 102 101   CO2 mmol/L 22.0 21.0*   BUN mg/dL 7 10   CREATININE mg/dL 0.66 0.73   CALCIUM mg/dL 8.7 8.4*   BILIRUBIN mg/dL  --  0.6   ALK PHOS U/L  --  61   ALT (SGPT) U/L  --  37*   AST (SGOT) U/L  --  43* "   GLUCOSE mg/dL 108* 104*         Magnesium   Date Value Ref Range Status   10/20/2022 1.9 1.6 - 2.6 mg/dL Final   10/19/2022 1.5 (L) 1.6 - 2.6 mg/dL Final     Phosphorus   Date Value Ref Range Status   10/19/2022 4.3 2.5 - 4.5 mg/dL Final                    Discharge Medications      New Medications      Instructions Start Date   amoxicillin-clavulanate 875-125 MG per tablet  Commonly known as: AUGMENTIN   1 tablet, Oral, Every 12 Hours Scheduled      doxycycline 100 MG capsule  Commonly known as: MONODOX   100 mg, Oral, Every 12 Hours Scheduled         Continue These Medications      Instructions Start Date   acetaminophen 500 MG tablet  Commonly known as: TYLENOL   1,000 mg, Oral, Every 6 Hours PRN      desvenlafaxine 50 MG 24 hr tablet  Commonly known as: PRISTIQ   50 mg, Oral, Daily, Pt taking 100 mg daily      vilazodone 10 MG tablet tablet  Commonly known as: VIIBRYD   10 mg, Oral, Daily         Stop These Medications    aspirin-acetaminophen-caffeine 250-250-65 MG per tablet  Commonly known as: EXCEDRIN MIGRAINE     lisinopril-hydrochlorothiazide 20-12.5 MG per tablet  Commonly known as: PRINZIDE,ZESTORETIC     mupirocin 2 % ointment  Commonly known as: BACTROBAN            Diet Instructions     Diet: Regular      Discharge Diet: Regular        Activity Instructions     Activity as Tolerated          Follow-up Appointments  No future appointments.  Additional Instructions for the Follow-ups that You Need to Schedule     Discharge Follow-up with PCP   As directed       Currently Documented PCP:    Anastacia Jose MD    PCP Phone Number:    [phone]     Follow Up Details: Please f/u within 2 weeks for repeat chest x-ray as ordered.         XR Chest 2 View    Nov 03, 2022 (Approximate)      To be obtained prior to or on day of of follow-up PCP appointment.    Exam reason: F/u pneumonia    Pregnant?: Unknown    Release to patient: Routine Release               Discharge Instructions:  1. F/u appointments  as above  2. Chest x-ray at or prior to follow-up, as above and ordered  3. Medication changes as above  4. Please call 911 or report to your nearest emergency room if symptoms reoccur or worsen     Lizbeth Bradshaw, MSN, APRN, [facility]  Pulmonary & Critical Care Medicine    Time: I spent 35 minutes on this discharge activity which included: face-to-face encounter with the patient, reviewing the data in the system, coordination of the care with the nursing staff as well as consultants, documentation, and entering orders.      CC: Anastacia Jose MD    ADDENDUM:  Reviewed and agree.

## 2022-10-23 LAB
BACTERIA SPEC AEROBE CULT: NORMAL
BACTERIA SPEC AEROBE CULT: NORMAL

## 2022-10-24 ENCOUNTER — TELEPHONE (OUTPATIENT)
Dept: PULMONOLOGY | Facility: CLINIC | Age: 47
End: 2022-10-24

## 2022-10-24 NOTE — TELEPHONE ENCOUNTER
Patient called, she received [employer] message reqarding procalcitonin results. She was concerned further treatment was needed due to abnormal results. Please advise.

## 2022-10-28 NOTE — PAYOR COMM NOTE
"[facility]   phone: [phone]  fax: [phone]        Danny Freeman (47 y.o. Female)     Date of Birth   1975    Social Security Number       Address   [address] [place] [address]    Home Phone   [phone]    MRN   [id number]       Taoism   None    Marital Status                               Admission Date   10/18/22    Admission Type   Emergency    Admitting Provider   Asher Sims MD    Attending Provider       Department, Room/Bed   [facility] 2HSI, S252/1       Discharge Date   10/20/2022    Discharge Disposition   Home or Self Care    Discharge Destination                               Attending Provider: (none)   Allergies: Macrobid [Nitrofurantoin Macrocrystal]    Isolation: None   Infection: None   Code Status: Prior    Ht: 157.5 cm (62\")   Wt: 70 kg (154 lb 5.2 oz)    Admission Cmt: None   Principal Problem: Sepsis [A41.9]                 Active Insurance as of 10/18/2022     Primary Coverage     Payor Plan Insurance Group Employer/Plan Group    [employer] BLUE [employer]OSS [employer] BLUE [employer]OSS BLUE [employer]IELD PPO [id number]     Payor Plan Address Payor Plan Phone Number Payor Plan Fax Number Effective Dates    [address] [phone]  2022 - None Entered    [place] [address]       Subscriber Name Subscriber Birth Date Member ID       DANNY FREEMAN 1975 [id number]                 Emergency Contacts      (Rel.) Home Phone Work Phone Mobile Phone    David Freeman (Spouse) [phone] -- [phone]               Discharge Summary      Asher Sims MD at 10/20/22 1028          Discharge Summary    Patient name: Danny Freeman  CSN: [id number]  MRN: [id number]  : 1975  Today's date: 10/20/2022     Date of Admission: 10/18/2022  Date of Discharge:  10/20/2022    Admitting Physician:  Asher Sims MD   Primary Care Provider: Anastacia Jose MD  Consultations:   None    Admission Diagnosis: "   Sepsis    Discharge Diagnoses:   Active Hospital Problems    Diagnosis    • **Sepsis    • Septic shock (HCC)    • Class 1 obesity in adult    • H/O Clostridium difficile/norovirus infection (2019)     • GERD     • Tobacco use    • Lactic acidosis        Allergies:  Macrobid [nitrofurantoin macrocrystal]    Code Status and Medical Interventions:   Ordered at: 10/18/22 1726     Level Of Support Discussed With:    Patient     Code Status (Patient has no pulse and is not breathing):    CPR (Attempt to Resuscitate)     Medical Interventions (Patient has pulse or is breathing):    Full Support       Procedures/Testing:  CT Abdomen Pelvis With Contrast    Result Date: 10/18/2022   1. No acute findings in the abdomen or pelvis. 2. Sigmoid diverticulosis without evidence of acute diverticulitis. Normal appendix. 3. Small bilateral ovarian cysts. Right ovarian cyst has diminished in size since 03/31/2021. 4. Hysterectomy. Cholecystectomy. 5. Surgical changes in the left lower lobe. A previously described 2 cm left lower lobe lung nodule is no longer visualized.    This report was finalized on 10/18/2022 1:34 PM by Jodie Jeffrey MD.      US Liver    Result Date: 10/18/2022  Moderate diffuse hepatic steatosis. No acute findings in the right upper quadrant otherwise.  This report was finalized on 10/18/2022 6:32 PM by Cl Gamboa.      XR Chest 1 View    Result Date: 10/19/2022  Right midlung airspace consolidation, or pronounced as compared to the previous study with extension to the periphery with a triangular configuration, likely related to pneumonia and atelectasis. Pulmonary infarct or mass cannot be excluded. Contrasted CT evaluation may be warranted.  This report was finalized on 10/19/2022 7:10 AM by Tanya Traore MD.      XR Chest 1 View    Result Date: 10/18/2022  1.  Rounded opacity within the right lung that may be secondary to pneumonia. Follow-up after routine medical therapy recommended to document  resolution.  This report was finalized on 10/18/2022 5:02 PM by Marv Mitchell MD.      CT Angiogram Chest    Result Date: 10/19/2022   1. Right upper lobe pneumonia. 2. No evidence of pulmonary embolism. 3. Bronchi are clear.    This report was finalized on 10/19/2022 2:35 PM by Serg Malone MD.           History of Present Illness: (Copied from H&P 10/18)  Mishel Freeman is a 47 y.o. female smoker with PMH of GERD, HTN, C-diff infection, and LLL nodule s/p EBUS (negative) who presents to [facility] ED on 10/18/22 with abdominal pain and hypotension.      The patient denotes nausea, vomiting, myalgias, weakness, dizziness, and abdominal pain that started this morning with low grade fever. CT A/P unrevealing for acute process. Labs do show a LA of 3.7 and WBC of 19, however PCT is 0.05. UA unrevealing for UTI and CXR currently pending. BP marginal in the 80-90s despite 2L NS and given empiric Zosyn. She will be admitted to the ICU for higher level of care.      She has been evaluated by Dr. Godwin in the past for abdominal pain, bloating, and nausea/vomiting. Biopsies were negative and celiac panel was unrevealing. In 2019 she required admission for abdominal pain and CT imaging was negative for small bowel obstruction or internal hernia but did show enteritis. She was found to be C. Diff positive treated with PO Vanc per ID as well as norovirus.   (End of copied text)    Hospital Course:  Ms. Freeman was admitted to the ICU secondary to the problems above. She was on norepinephrine for BP support for a few hours. Abdominal CT scan negative. Liver ultrasound showed mild hepatic steatosis without any other pathology. Initial chest x-ray showed some groundglass opacities in the RUL without any cough, sputum, or CP. Repeat x-ray showed worsening consolidation. CTA showed RUL pneumonia. Patient was started on antibiotics and felt better. She required 3L NC for a short period without vasopressors for the duration of  "her stay.    It has been determined that Ms. Freeman has reached the maximum benefit of her inpatient stay. She is hemodynamically stable, remained on RA for 24 hours,  tolerating her oral medications and an oral diet.     Vitals:  /75   Pulse 71   Temp 98.3 °F (36.8 °C) (Oral)   Resp 18   Ht 157.5 cm (62\")   Wt 70 kg (154 lb 5.2 oz)   SpO2 99%   BMI 28.23 kg/m²     Physical Exam:   Constitutional:  Appears well-developed and well-nourished. No distress.   HEENT:  Normocephalic and atraumatic. PERRL  Neck:  Neck supple. No JVD present.   CV: Normal rate, regular rhythm,  intact distal pulses.  No gallop, murmur or rub.  Pulmonary/Chest: Effort normal and breath sounds normal. No respiratory distress. No wheezes, rhonchi or rales.   Abdominal: Soft. +BS. No distension and no mass. There is no tenderness.   Musculoskeletal: Normal muscle tone and strength  Neurological: Alert and oriented to person, place, and time.  No focal deficits  Skin: Skin is warm and dry. No rash noted.   Extremities:  No clubbing, edema or cyanosis  Psychiatric: Normal mood and affect. Behavior is normal.     Labs:  Results from last 7 days   Lab Units 10/20/22  0414   WBC 10*3/mm3 15.30*   HEMOGLOBIN g/dL 8.9*   HEMATOCRIT % 27.0*   PLATELETS 10*3/mm3 241     Results from last 7 days   Lab Units 10/20/22  0414 10/19/22  0225   SODIUM mmol/L 133* 132*   POTASSIUM mmol/L 3.5 3.7   CHLORIDE mmol/L 102 101   CO2 mmol/L 22.0 21.0*   BUN mg/dL 7 10   CREATININE mg/dL 0.66 0.73   CALCIUM mg/dL 8.7 8.4*   BILIRUBIN mg/dL  --  0.6   ALK PHOS U/L  --  61   ALT (SGPT) U/L  --  37*   AST (SGOT) U/L  --  43*   GLUCOSE mg/dL 108* 104*         Magnesium   Date Value Ref Range Status   10/20/2022 1.9 1.6 - 2.6 mg/dL Final   10/19/2022 1.5 (L) 1.6 - 2.6 mg/dL Final     Phosphorus   Date Value Ref Range Status   10/19/2022 4.3 2.5 - 4.5 mg/dL Final                    Discharge Medications      New Medications      Instructions Start Date "   amoxicillin-clavulanate 875-125 MG per tablet  Commonly known as: AUGMENTIN   1 tablet, Oral, Every 12 Hours Scheduled      doxycycline 100 MG capsule  Commonly known as: MONODOX   100 mg, Oral, Every 12 Hours Scheduled         Continue These Medications      Instructions Start Date   acetaminophen 500 MG tablet  Commonly known as: TYLENOL   1,000 mg, Oral, Every 6 Hours PRN      desvenlafaxine 50 MG 24 hr tablet  Commonly known as: PRISTIQ   50 mg, Oral, Daily, Pt taking 100 mg daily      vilazodone 10 MG tablet tablet  Commonly known as: VIIBRYD   10 mg, Oral, Daily         Stop These Medications    aspirin-acetaminophen-caffeine 250-250-65 MG per tablet  Commonly known as: EXCEDRIN MIGRAINE     lisinopril-hydrochlorothiazide 20-12.5 MG per tablet  Commonly known as: PRINZIDE,ZESTORETIC     mupirocin 2 % ointment  Commonly known as: BACTROBAN            Diet Instructions     Diet: Regular      Discharge Diet: Regular        Activity Instructions     Activity as Tolerated          Follow-up Appointments  No future appointments.  Additional Instructions for the Follow-ups that You Need to Schedule     Discharge Follow-up with PCP   As directed       Currently Documented PCP:    Anastacia Jose MD    PCP Phone Number:    [phone]     Follow Up Details: Please f/u within 2 weeks for repeat chest x-ray as ordered.         XR Chest 2 View    Nov 03, 2022 (Approximate)      To be obtained prior to or on day of of follow-up PCP appointment.    Exam reason: F/u pneumonia    Pregnant?: Unknown    Release to patient: Routine Release               Discharge Instructions:  1. F/u appointments as above  2. Chest x-ray at or prior to follow-up, as above and ordered  3. Medication changes as above  4. Please call 911 or report to your nearest emergency room if symptoms reoccur or worsen     Lizbeth Bradshaw, MSN, APRN, AGACNP-BC  Pulmonary & Critical Care Medicine    Time: I spent 35 minutes on this discharge activity  which included: face-to-face encounter with the patient, reviewing the data in the system, coordination of the care with the nursing staff as well as consultants, documentation, and entering orders.      CC: Anastacia Jose MD    ADDENDUM:  Reviewed and agree.       Electronically signed by Asher Sims MD at 10/21/22 0974

## 2022-11-04 ENCOUNTER — APPOINTMENT (OUTPATIENT)
Dept: GENERAL RADIOLOGY | Facility: HOSPITAL | Age: 47
End: 2022-11-04

## 2022-11-04 ENCOUNTER — HOSPITAL ENCOUNTER (EMERGENCY)
Facility: HOSPITAL | Age: 47
Discharge: HOME | End: 2022-11-04
Attending: EMERGENCY MEDICINE | Admitting: EMERGENCY MEDICINE

## 2022-11-04 VITALS
HEIGHT: 62 IN | WEIGHT: 182 LBS | SYSTOLIC BLOOD PRESSURE: 118 MMHG | TEMPERATURE: 98.7 F | HEART RATE: 96 BPM | BODY MASS INDEX: 33.49 KG/M2 | RESPIRATION RATE: 20 BRPM | OXYGEN SATURATION: 95 % | DIASTOLIC BLOOD PRESSURE: 78 MMHG

## 2022-11-04 DIAGNOSIS — J18.9 PNEUMONITIS: Primary | ICD-10-CM

## 2022-11-04 LAB
ANION GAP SERPL CALCULATED.3IONS-SCNC: 11 MMOL/L (ref 5–15)
BACTERIA UR QL AUTO: ABNORMAL /HPF
BASOPHILS # BLD AUTO: 0.05 10*3/MM3 (ref 0–0.2)
BASOPHILS NFR BLD AUTO: 0.4 % (ref 0–1.5)
BILIRUB UR QL STRIP: NEGATIVE
BUN SERPL-MCNC: 9 MG/DL (ref 6–20)
BUN/CREAT SERPL: 13.2 (ref 7–25)
CALCIUM SPEC-SCNC: 9.6 MG/DL (ref 8.6–10.5)
CHLORIDE SERPL-SCNC: 100 MMOL/L (ref 98–107)
CLARITY UR: ABNORMAL
CO2 SERPL-SCNC: 24 MMOL/L (ref 22–29)
COLOR UR: YELLOW
CREAT SERPL-MCNC: 0.68 MG/DL (ref 0.57–1)
DEPRECATED RDW RBC AUTO: 47 FL (ref 37–54)
EGFRCR SERPLBLD CKD-EPI 2021: 107.6 ML/MIN/1.73
EOSINOPHIL # BLD AUTO: 0.19 10*3/MM3 (ref 0–0.4)
EOSINOPHIL NFR BLD AUTO: 1.4 % (ref 0.3–6.2)
ERYTHROCYTE [DISTWIDTH] IN BLOOD BY AUTOMATED COUNT: 13.9 % (ref 12.3–15.4)
FLUAV RNA RESP QL NAA+PROBE: NOT DETECTED
FLUBV RNA RESP QL NAA+PROBE: NOT DETECTED
GLUCOSE SERPL-MCNC: 99 MG/DL (ref 65–99)
GLUCOSE UR STRIP-MCNC: NEGATIVE MG/DL
HCT VFR BLD AUTO: 37.7 % (ref 34–46.6)
HGB BLD-MCNC: 12.1 G/DL (ref 12–15.9)
HGB UR QL STRIP.AUTO: ABNORMAL
HOLD SPECIMEN: NORMAL
HOLD SPECIMEN: NORMAL
HYALINE CASTS UR QL AUTO: ABNORMAL /LPF
IMM GRANULOCYTES # BLD AUTO: 0.04 10*3/MM3 (ref 0–0.05)
IMM GRANULOCYTES NFR BLD AUTO: 0.3 % (ref 0–0.5)
KETONES UR QL STRIP: NEGATIVE
LEUKOCYTE ESTERASE UR QL STRIP.AUTO: NEGATIVE
LYMPHOCYTES # BLD AUTO: 2.6 10*3/MM3 (ref 0.7–3.1)
LYMPHOCYTES NFR BLD AUTO: 19.7 % (ref 19.6–45.3)
MCH RBC QN AUTO: 30.2 PG (ref 26.6–33)
MCHC RBC AUTO-ENTMCNC: 32.1 G/DL (ref 31.5–35.7)
MCV RBC AUTO: 94 FL (ref 79–97)
MONOCYTES # BLD AUTO: 0.76 10*3/MM3 (ref 0.1–0.9)
MONOCYTES NFR BLD AUTO: 5.8 % (ref 5–12)
MUCOUS THREADS URNS QL MICRO: ABNORMAL /HPF
NEUTROPHILS NFR BLD AUTO: 72.4 % (ref 42.7–76)
NEUTROPHILS NFR BLD AUTO: 9.56 10*3/MM3 (ref 1.7–7)
NITRITE UR QL STRIP: NEGATIVE
NRBC BLD AUTO-RTO: 0 /100 WBC (ref 0–0.2)
NT-PROBNP SERPL-MCNC: 31.2 PG/ML (ref 0–450)
PH UR STRIP.AUTO: 5.5 [PH] (ref 5–8)
PLATELET # BLD AUTO: 359 10*3/MM3 (ref 140–450)
PMV BLD AUTO: 9.1 FL (ref 6–12)
POTASSIUM SERPL-SCNC: 4.5 MMOL/L (ref 3.5–5.2)
PROT UR QL STRIP: NEGATIVE
RBC # BLD AUTO: 4.01 10*6/MM3 (ref 3.77–5.28)
RBC # UR STRIP: ABNORMAL /HPF
REF LAB TEST METHOD: ABNORMAL
SARS-COV-2 RNA RESP QL NAA+PROBE: NOT DETECTED
SODIUM SERPL-SCNC: 135 MMOL/L (ref 136–145)
SP GR UR STRIP: 1.02 (ref 1–1.03)
SQUAMOUS #/AREA URNS HPF: ABNORMAL /HPF
TROPONIN T SERPL-MCNC: <0.01 NG/ML (ref 0–0.03)
UROBILINOGEN UR QL STRIP: ABNORMAL
WBC # UR STRIP: ABNORMAL /HPF
WBC NRBC COR # BLD: 13.2 10*3/MM3 (ref 3.4–10.8)
WHOLE BLOOD HOLD COAG: NORMAL
WHOLE BLOOD HOLD SPECIMEN: NORMAL

## 2022-11-04 PROCEDURE — 63710000001 DEXAMETHASONE PER 0.25 MG: Performed by: EMERGENCY MEDICINE

## 2022-11-04 PROCEDURE — 87636 SARSCOV2 & INF A&B AMP PRB: CPT

## 2022-11-04 PROCEDURE — 83880 ASSAY OF NATRIURETIC PEPTIDE: CPT | Performed by: EMERGENCY MEDICINE

## 2022-11-04 PROCEDURE — 84484 ASSAY OF TROPONIN QUANT: CPT | Performed by: EMERGENCY MEDICINE

## 2022-11-04 PROCEDURE — 87040 BLOOD CULTURE FOR BACTERIA: CPT | Performed by: EMERGENCY MEDICINE

## 2022-11-04 PROCEDURE — 80048 BASIC METABOLIC PNL TOTAL CA: CPT

## 2022-11-04 PROCEDURE — 93005 ELECTROCARDIOGRAM TRACING: CPT

## 2022-11-04 PROCEDURE — 36415 COLL VENOUS BLD VENIPUNCTURE: CPT

## 2022-11-04 PROCEDURE — 81001 URINALYSIS AUTO W/SCOPE: CPT

## 2022-11-04 PROCEDURE — 85025 COMPLETE CBC W/AUTO DIFF WBC: CPT

## 2022-11-04 PROCEDURE — 99284 EMERGENCY DEPT VISIT MOD MDM: CPT

## 2022-11-04 PROCEDURE — 93005 ELECTROCARDIOGRAM TRACING: CPT | Performed by: EMERGENCY MEDICINE

## 2022-11-04 PROCEDURE — 71046 X-RAY EXAM CHEST 2 VIEWS: CPT

## 2022-11-04 RX ORDER — AZITHROMYCIN 250 MG/1
500 TABLET, FILM COATED ORAL ONCE
Status: COMPLETED | OUTPATIENT
Start: 2022-11-04 | End: 2022-11-04

## 2022-11-04 RX ORDER — AMOXICILLIN AND CLAVULANATE POTASSIUM 875; 125 MG/1; MG/1
1 TABLET, FILM COATED ORAL EVERY 12 HOURS SCHEDULED
Qty: 14 TABLET | Refills: 0 | Status: SHIPPED | OUTPATIENT
Start: 2022-11-04 | End: 2022-12-05

## 2022-11-04 RX ORDER — BENZONATATE 100 MG/1
200 CAPSULE ORAL ONCE
Status: COMPLETED | OUTPATIENT
Start: 2022-11-04 | End: 2022-11-04

## 2022-11-04 RX ORDER — AZITHROMYCIN 250 MG/1
250 TABLET, FILM COATED ORAL DAILY
Qty: 4 TABLET | Refills: 0 | Status: SHIPPED | OUTPATIENT
Start: 2022-11-04 | End: 2022-11-08

## 2022-11-04 RX ORDER — DEXAMETHASONE 4 MG/1
4 TABLET ORAL ONCE
Status: COMPLETED | OUTPATIENT
Start: 2022-11-04 | End: 2022-11-04

## 2022-11-04 RX ORDER — SODIUM CHLORIDE 0.9 % (FLUSH) 0.9 %
10 SYRINGE (ML) INJECTION AS NEEDED
Status: DISCONTINUED | OUTPATIENT
Start: 2022-11-04 | End: 2022-11-05 | Stop reason: HOSPADM

## 2022-11-04 RX ORDER — BROMPHENIRAMINE MALEATE, PSEUDOEPHEDRINE HYDROCHLORIDE, AND DEXTROMETHORPHAN HYDROBROMIDE 2; 30; 10 MG/5ML; MG/5ML; MG/5ML
5 SYRUP ORAL 4 TIMES DAILY PRN
Qty: 100 ML | Refills: 0 | Status: ON HOLD | OUTPATIENT
Start: 2022-11-04 | End: 2023-03-20

## 2022-11-04 RX ADMIN — AZITHROMYCIN MONOHYDRATE 500 MG: 250 TABLET ORAL at 23:43

## 2022-11-04 RX ADMIN — DEXAMETHASONE 4 MG: 4 TABLET ORAL at 23:43

## 2022-11-04 RX ADMIN — BENZONATATE 200 MG: 100 CAPSULE ORAL at 23:44

## 2022-11-05 LAB — HOLD SPECIMEN: NORMAL

## 2022-11-05 NOTE — ED PROVIDER NOTES
EMERGENCY DEPARTMENT ENCOUNTER    Pt Name: Mishel Freeman  MRN: [id number]  Pt :   1975  Room Number:    Date of encounter:  2022  PCP: Anastacia Jose MD  ED Provider: Danilo Davis MD    Historian: Patient      HPI:  Chief Complaint: Cough        Context: Mishel Freeman is a 47 y.o. female who presents to the ED c/o continued cough after prior treatment for pneumonia in October, 2 weeks ago.  She does continue to feel drained but has not had a fever, she has some sinus congestion and headache as well.      PAST MEDICAL HISTORY  Past Medical History:   Diagnosis Date   • Anxiety    • Depression    • Diverticulitis    • Eczema    • GERD (gastroesophageal reflux disease)    • Headache    • Hypertension    • Lung nodule    • Tobacco abuse          PAST SURGICAL HISTORY  Past Surgical History:   Procedure Laterality Date   • BRONCHOSCOPY N/A 2019    Procedure: DARIN, RADIAL EBUS WITH FLUORO BRONCH;  Surgeon: Nikhil Knutson MD;  Location: [facility] ENDOSCOPY;  Service: Pulmonary   • CHOLECYSTECTOMY     • COLONOSCOPY  ,    • ENDOMETRIAL ABLATION W/ NOVASURE      WITH LAP BTL   • VAGINAL HYSTERECTOMY      Partial          FAMILY HISTORY  Family History   Problem Relation Age of Onset   • Breast cancer Paternal Aunt         40's   • Cancer Mother    • Diabetes Father    • Diabetes Brother    • Cancer Maternal Aunt    • Ovarian cancer Neg Hx          SOCIAL HISTORY  Social History     Socioeconomic History   • Marital status:    • Number of children: 1   Tobacco Use   • Smoking status: Every Day     Packs/day: 0.25     Years: 30.00     Pack years: 7.50     Types: Cigarettes   • Smokeless tobacco: Never   • Tobacco comments:     5 a day    Substance and Sexual Activity   • Alcohol use: Yes     Alcohol/week: 1.0 standard drink     Types: 1 Cans of beer per week     Comment: socially   • Drug use: No   • Sexual activity: Defer          ALLERGIES  Macrobid [nitrofurantoin macrocrystal]        REVIEW OF SYSTEMS  Review of Systems       Constitutional: Negative. No fever, no weakness.   HENT: Negative for sneezing and sore throat.    Positive for cough, no shortness of breath  Cardiovascular: Negative.  Negative for chest pain.  No pleuritic chest pain, no lower extremity pain or swelling  Gastrointestinal: Negative.  Negative for abdominal pain.   Genitourinary: Negative.  Negative for difficulty urinating.     All systems reviewwed and negative except as noted in HPI.    PHYSICAL EXAM    I have reviewed the triage vital signs and nursing notes.    ED Triage Vitals [11/04/22 1914]   Temp Heart Rate Resp BP SpO2   98.7 °F (37.1 °C) 112 20 125/92 96 %      Temp src Heart Rate Source Patient Position BP Location FiO2 (%)   Oral Monitor Sitting Left arm --       Physical Exam  GENERAL:   Appears alert, pleasant, no acute distress  HENT: Nares patent.  EYES: No scleral icterus.  CV: Regular rhythm, regular rate.  RESPIRATORY: Normal effort.  No audible wheezes, rales or rhonchi.  ABDOMEN: Soft, nontender  MUSCULOSKELETAL: No deformities.   NEURO: Alert, moves all extremities, follows commands.  SKIN: Warm, dry, no rash visualized.        LAB RESULTS  Recent Results (from the past 24 hour(s))   ECG 12 Lead Other; RECENTLY IN ICU FOR SEPTIC SHOCK.    Collection Time: 11/04/22  8:03 PM   Result Value Ref Range    QT Interval 340 ms    QTC Interval 440 ms   Basic Metabolic Panel    Collection Time: 11/04/22  9:38 PM    Specimen: Blood   Result Value Ref Range    Glucose 99 65 - 99 mg/dL    BUN 9 6 - 20 mg/dL    Creatinine 0.68 0.57 - 1.00 mg/dL    Sodium 135 (L) 136 - 145 mmol/L    Potassium 4.5 3.5 - 5.2 mmol/L    Chloride 100 98 - 107 mmol/L    CO2 24.0 22.0 - 29.0 mmol/L    Calcium 9.6 8.6 - 10.5 mg/dL    BUN/Creatinine Ratio 13.2 7.0 - 25.0    Anion Gap 11.0 5.0 - 15.0 mmol/L    eGFR 107.6 >60.0 mL/min/1.73   CBC Auto Differential    Collection  Time: 11/04/22  9:38 PM    Specimen: Blood   Result Value Ref Range    WBC 13.20 (H) 3.40 - 10.80 10*3/mm3    RBC 4.01 3.77 - 5.28 10*6/mm3    Hemoglobin 12.1 12.0 - 15.9 g/dL    Hematocrit 37.7 34.0 - 46.6 %    MCV 94.0 79.0 - 97.0 fL    MCH 30.2 26.6 - 33.0 pg    MCHC 32.1 31.5 - 35.7 g/dL    RDW 13.9 12.3 - 15.4 %    RDW-SD 47.0 37.0 - 54.0 fl    MPV 9.1 6.0 - 12.0 fL    Platelets 359 140 - 450 10*3/mm3    Neutrophil % 72.4 42.7 - 76.0 %    Lymphocyte % 19.7 19.6 - 45.3 %    Monocyte % 5.8 5.0 - 12.0 %    Eosinophil % 1.4 0.3 - 6.2 %    Basophil % 0.4 0.0 - 1.5 %    Immature Grans % 0.3 0.0 - 0.5 %    Neutrophils, Absolute 9.56 (H) 1.70 - 7.00 10*3/mm3    Lymphocytes, Absolute 2.60 0.70 - 3.10 10*3/mm3    Monocytes, Absolute 0.76 0.10 - 0.90 10*3/mm3    Eosinophils, Absolute 0.19 0.00 - 0.40 10*3/mm3    Basophils, Absolute 0.05 0.00 - 0.20 10*3/mm3    Immature Grans, Absolute 0.04 0.00 - 0.05 10*3/mm3    nRBC 0.0 0.0 - 0.2 /100 WBC   Green Top (Gel)    Collection Time: 11/04/22  9:38 PM   Result Value Ref Range    Extra Tube Hold for add-ons.    Lavender Top    Collection Time: 11/04/22  9:38 PM   Result Value Ref Range    Extra Tube hold for add-on    Gold Top - SST    Collection Time: 11/04/22  9:38 PM   Result Value Ref Range    Extra Tube Hold for add-ons.    Light Blue Top    Collection Time: 11/04/22  9:38 PM   Result Value Ref Range    Extra Tube Hold for add-ons.    BNP    Collection Time: 11/04/22  9:38 PM    Specimen: Blood   Result Value Ref Range    proBNP 31.2 0.0 - 450.0 pg/mL   Troponin    Collection Time: 11/04/22  9:38 PM    Specimen: Blood   Result Value Ref Range    Troponin T <0.010 0.000 - 0.030 ng/mL   COVID-19 and FLU A/B PCR - Swab, Nasopharynx    Collection Time: 11/04/22  9:40 PM    Specimen: Nasopharynx; Swab   Result Value Ref Range    COVID19 Not Detected Not Detected - Ref. Range    Influenza A PCR Not Detected Not Detected    Influenza B PCR Not Detected Not Detected   Urinalysis  With Microscopic If Indicated (No Culture) - Urine, Clean Catch    Collection Time: 11/04/22  9:42 PM    Specimen: Urine, Clean Catch   Result Value Ref Range    Color, UA Yellow Yellow, Straw    Appearance, UA Cloudy (A) Clear    pH, UA 5.5 5.0 - 8.0    Specific Gravity, UA 1.024 1.001 - 1.030    Glucose, UA Negative Negative    Ketones, UA Negative Negative    Bilirubin, UA Negative Negative    Blood, UA Moderate (2+) (A) Negative    Protein, UA Negative Negative    Leuk Esterase, UA Negative Negative    Nitrite, UA Negative Negative    Urobilinogen, UA 0.2 E.U./dL 0.2 - 1.0 E.U./dL   Urinalysis, Microscopic Only - Urine, Clean Catch    Collection Time: 11/04/22  9:42 PM    Specimen: Urine, Clean Catch   Result Value Ref Range    RBC, UA 0-2 None Seen, 0-2 /HPF    WBC, UA 0-2 None Seen, 0-2 /HPF    Bacteria, UA 1+ (A) None Seen, Trace /HPF    Squamous Epithelial Cells, UA 3-6 (A) None Seen, 0-2 /HPF    Hyaline Casts, UA 0-6 0 - 6 /LPF    Mucus, UA Trace None Seen, Trace /HPF    Methodology Manual Light Microscopy        If labs were ordered, I independently reviewed the results.        RADIOLOGY  No Radiology Exams Resulted Within Past 24 Hours        PROCEDURES    Procedures    ECG 12 Lead Other; RECENTLY IN ICU FOR SEPTIC SHOCK.   Preliminary Result   Test Reason : Other~   Blood Pressure :   */*   mmHG   Vent. Rate : 101 BPM     Atrial Rate : 101 BPM      P-R Int : 136 ms          QRS Dur :  80 ms       QT Int : 340 ms       P-R-T Axes :  54  61  56 degrees      QTc Int : 440 ms      Sinus tachycardia   Nonspecific T wave abnormality   Abnormal ECG   When compared with ECG of 18-OCT-2022 11:39,   Nonspecific T wave abnormality, improved in Inferior leads   Nonspecific T wave abnormality has replaced inverted T waves in Lateral    leads      Referred By: EDMD           Confirmed By:           MEDICATIONS GIVEN IN ER    Medications   sodium chloride 0.9 % flush 10 mL (has no administration in time range)    dexamethasone (DECADRON) tablet 4 mg (has no administration in time range)   benzonatate (TESSALON) capsule 200 mg (has no administration in time range)   azithromycin (ZITHROMAX) tablet 500 mg (has no administration in time range)         PROGRESS, DATA ANALYSIS, CONSULTS, AND MEDICAL DECISION MAKING    All labs have been independently reviewed by me.  All radiology studies have been reviewed by me and the radiologist dictating the report.   EKG's have been independently viewed and interpreted by me.      Differential diagnoses: Bronchitis, pneumonia, sepsis    Heart rate on reevaluation is 90, she is comfortable on room air, clinically I find symptoms of continued cough after prior pneumonia with some improvement on the chest x-ray, clinically I do not see any signs or findings on testing, or symptoms to indicate any thromboembolic/PE presentation today.  No clinical findings of sepsis at this time.               AS OF 23:01 EDT VITALS:    BP - 125/92  HR - 112  TEMP - 98.7 °F (37.1 °C) (Oral)  O2 SATS - 96%                  DIAGNOSIS  Final diagnoses:   Pneumonitis         DISPOSITION  DISCHARGE    Patient discharged in stable condition.    Reviewed implications of results, diagnosis, meds, responsibility to follow up, warning signs and symptoms of possible worsening, potential complications and reasons to return to ER.    Patient/Family voiced understanding of above instructions.    Discussed plan for discharge, as there is no emergent indication for admission.  Pt/family is agreeable and understands need for follow up and possible repeat testing.  Pt/family is aware that discharge does not mean that nothing is wrong but that it indicates no emergency is currently present that requires admission and they must continue care with follow-up as given below or with a physician of their choice.     FOLLOW-UP  Anastacia Jose MD  [address] [address] [phone]               Medication  List      New Prescriptions    amoxicillin-clavulanate 875-125 MG per tablet  Commonly known as: AUGMENTIN  Take 1 tablet by mouth Every 12 (Twelve) Hours.     azithromycin 250 MG tablet  Commonly known as: ZITHROMAX  Take 1 tablet by mouth Daily for 4 days.     brompheniramine-pseudoephedrine-DM 30-2-10 MG/5ML syrup  Take 5 mL by mouth 4 (Four) Times a Day As Needed for Allergies.           Where to Get Your Medications      These medications were sent to [facility] [id number] - [place], [place] - [address] [place] [address] - [phone]  - [phone] [place] [address]    Phone: [phone]   · amoxicillin-clavulanate 875-125 MG per tablet  · azithromycin 250 MG tablet  · brompheniramine-pseudoephedrine-DM 30-2-10 MG/5ML syrup                  Danilo Davis MD  11/04/22 3223

## 2022-11-07 LAB
QT INTERVAL: 340 MS
QTC INTERVAL: 440 MS

## 2022-11-09 LAB
BACTERIA SPEC AEROBE CULT: NORMAL
BACTERIA SPEC AEROBE CULT: NORMAL

## 2022-12-05 ENCOUNTER — APPOINTMENT (OUTPATIENT)
Dept: CT IMAGING | Facility: HOSPITAL | Age: 47
End: 2022-12-05

## 2022-12-05 ENCOUNTER — HOSPITAL ENCOUNTER (EMERGENCY)
Facility: HOSPITAL | Age: 47
Discharge: HOME | End: 2022-12-05
Attending: EMERGENCY MEDICINE | Admitting: EMERGENCY MEDICINE

## 2022-12-05 ENCOUNTER — APPOINTMENT (OUTPATIENT)
Dept: GENERAL RADIOLOGY | Facility: HOSPITAL | Age: 47
End: 2022-12-05

## 2022-12-05 VITALS
OXYGEN SATURATION: 97 % | DIASTOLIC BLOOD PRESSURE: 82 MMHG | BODY MASS INDEX: 32.2 KG/M2 | HEART RATE: 65 BPM | WEIGHT: 175 LBS | SYSTOLIC BLOOD PRESSURE: 128 MMHG | RESPIRATION RATE: 16 BRPM | HEIGHT: 62 IN | TEMPERATURE: 98.6 F

## 2022-12-05 DIAGNOSIS — B34.9 VIRAL ILLNESS: ICD-10-CM

## 2022-12-05 DIAGNOSIS — R10.10 UPPER ABDOMINAL PAIN: Primary | ICD-10-CM

## 2022-12-05 LAB
ALBUMIN SERPL-MCNC: 3.8 G/DL (ref 3.5–5.2)
ALBUMIN/GLOB SERPL: 1.5 G/DL
ALP SERPL-CCNC: 67 U/L (ref 39–117)
ALT SERPL W P-5'-P-CCNC: 25 U/L (ref 1–33)
ANION GAP SERPL CALCULATED.3IONS-SCNC: 12 MMOL/L (ref 5–15)
AST SERPL-CCNC: 27 U/L (ref 1–32)
BACTERIA UR QL AUTO: ABNORMAL /HPF
BASOPHILS # BLD AUTO: 0.03 10*3/MM3 (ref 0–0.2)
BASOPHILS NFR BLD AUTO: 0.5 % (ref 0–1.5)
BILIRUB SERPL-MCNC: 0.3 MG/DL (ref 0–1.2)
BILIRUB UR QL STRIP: NEGATIVE
BUN SERPL-MCNC: 7 MG/DL (ref 6–20)
BUN/CREAT SERPL: 14.3 (ref 7–25)
CALCIUM SPEC-SCNC: 8.8 MG/DL (ref 8.6–10.5)
CHLORIDE SERPL-SCNC: 103 MMOL/L (ref 98–107)
CLARITY UR: CLEAR
CO2 SERPL-SCNC: 22 MMOL/L (ref 22–29)
COLOR UR: YELLOW
CREAT SERPL-MCNC: 0.49 MG/DL (ref 0.57–1)
DEPRECATED RDW RBC AUTO: 48.2 FL (ref 37–54)
EGFRCR SERPLBLD CKD-EPI 2021: 116.4 ML/MIN/1.73
EOSINOPHIL # BLD AUTO: 0.22 10*3/MM3 (ref 0–0.4)
EOSINOPHIL NFR BLD AUTO: 3.8 % (ref 0.3–6.2)
ERYTHROCYTE [DISTWIDTH] IN BLOOD BY AUTOMATED COUNT: 14.2 % (ref 12.3–15.4)
FLUAV RNA RESP QL NAA+PROBE: NOT DETECTED
FLUBV RNA RESP QL NAA+PROBE: NOT DETECTED
GLOBULIN UR ELPH-MCNC: 2.6 GM/DL
GLUCOSE SERPL-MCNC: 102 MG/DL (ref 65–99)
GLUCOSE UR STRIP-MCNC: NEGATIVE MG/DL
HCT VFR BLD AUTO: 35.4 % (ref 34–46.6)
HGB BLD-MCNC: 11.7 G/DL (ref 12–15.9)
HGB UR QL STRIP.AUTO: ABNORMAL
HOLD SPECIMEN: NORMAL
HYALINE CASTS UR QL AUTO: ABNORMAL /LPF
IMM GRANULOCYTES # BLD AUTO: 0.02 10*3/MM3 (ref 0–0.05)
IMM GRANULOCYTES NFR BLD AUTO: 0.3 % (ref 0–0.5)
KETONES UR QL STRIP: NEGATIVE
LEUKOCYTE ESTERASE UR QL STRIP.AUTO: NEGATIVE
LIPASE SERPL-CCNC: 24 U/L (ref 13–60)
LYMPHOCYTES # BLD AUTO: 1.81 10*3/MM3 (ref 0.7–3.1)
LYMPHOCYTES NFR BLD AUTO: 31.4 % (ref 19.6–45.3)
MCH RBC QN AUTO: 30.9 PG (ref 26.6–33)
MCHC RBC AUTO-ENTMCNC: 33.1 G/DL (ref 31.5–35.7)
MCV RBC AUTO: 93.4 FL (ref 79–97)
MONOCYTES # BLD AUTO: 0.6 10*3/MM3 (ref 0.1–0.9)
MONOCYTES NFR BLD AUTO: 10.4 % (ref 5–12)
NEUTROPHILS NFR BLD AUTO: 3.09 10*3/MM3 (ref 1.7–7)
NEUTROPHILS NFR BLD AUTO: 53.6 % (ref 42.7–76)
NITRITE UR QL STRIP: NEGATIVE
NRBC BLD AUTO-RTO: 0 /100 WBC (ref 0–0.2)
PH UR STRIP.AUTO: 5.5 [PH] (ref 5–8)
PLATELET # BLD AUTO: 250 10*3/MM3 (ref 140–450)
PMV BLD AUTO: 9.1 FL (ref 6–12)
POTASSIUM SERPL-SCNC: 4.1 MMOL/L (ref 3.5–5.2)
PROT SERPL-MCNC: 6.4 G/DL (ref 6–8.5)
PROT UR QL STRIP: NEGATIVE
QT INTERVAL: 384 MS
QTC INTERVAL: 442 MS
RBC # BLD AUTO: 3.79 10*6/MM3 (ref 3.77–5.28)
RBC # UR STRIP: ABNORMAL /HPF
REF LAB TEST METHOD: ABNORMAL
SARS-COV-2 RNA RESP QL NAA+PROBE: NOT DETECTED
SODIUM SERPL-SCNC: 137 MMOL/L (ref 136–145)
SP GR UR STRIP: 1.07 (ref 1–1.03)
SQUAMOUS #/AREA URNS HPF: ABNORMAL /HPF
TROPONIN T SERPL-MCNC: <0.01 NG/ML (ref 0–0.03)
UROBILINOGEN UR QL STRIP: ABNORMAL
WBC # UR STRIP: ABNORMAL /HPF
WBC NRBC COR # BLD: 5.77 10*3/MM3 (ref 3.4–10.8)
WHOLE BLOOD HOLD COAG: NORMAL
WHOLE BLOOD HOLD SPECIMEN: NORMAL

## 2022-12-05 PROCEDURE — 96375 TX/PRO/DX INJ NEW DRUG ADDON: CPT

## 2022-12-05 PROCEDURE — 81001 URINALYSIS AUTO W/SCOPE: CPT | Performed by: EMERGENCY MEDICINE

## 2022-12-05 PROCEDURE — 84484 ASSAY OF TROPONIN QUANT: CPT | Performed by: EMERGENCY MEDICINE

## 2022-12-05 PROCEDURE — 80053 COMPREHEN METABOLIC PANEL: CPT | Performed by: EMERGENCY MEDICINE

## 2022-12-05 PROCEDURE — 83690 ASSAY OF LIPASE: CPT | Performed by: EMERGENCY MEDICINE

## 2022-12-05 PROCEDURE — 25010000002 ONDANSETRON PER 1 MG: Performed by: EMERGENCY MEDICINE

## 2022-12-05 PROCEDURE — 74177 CT ABD & PELVIS W/CONTRAST: CPT

## 2022-12-05 PROCEDURE — 96374 THER/PROPH/DIAG INJ IV PUSH: CPT

## 2022-12-05 PROCEDURE — 25010000002 HYDROMORPHONE PER 4 MG: Performed by: EMERGENCY MEDICINE

## 2022-12-05 PROCEDURE — 25010000002 IOPAMIDOL 61 % SOLUTION: Performed by: EMERGENCY MEDICINE

## 2022-12-05 PROCEDURE — 99284 EMERGENCY DEPT VISIT MOD MDM: CPT

## 2022-12-05 PROCEDURE — 71045 X-RAY EXAM CHEST 1 VIEW: CPT

## 2022-12-05 PROCEDURE — 85025 COMPLETE CBC W/AUTO DIFF WBC: CPT | Performed by: EMERGENCY MEDICINE

## 2022-12-05 PROCEDURE — 93005 ELECTROCARDIOGRAM TRACING: CPT | Performed by: EMERGENCY MEDICINE

## 2022-12-05 PROCEDURE — 87636 SARSCOV2 & INF A&B AMP PRB: CPT | Performed by: EMERGENCY MEDICINE

## 2022-12-05 RX ORDER — BENZONATATE 100 MG/1
100 CAPSULE ORAL 3 TIMES DAILY PRN
Qty: 20 CAPSULE | Refills: 0 | Status: ON HOLD | OUTPATIENT
Start: 2022-12-05 | End: 2023-03-20

## 2022-12-05 RX ORDER — HYDROMORPHONE HYDROCHLORIDE 1 MG/ML
0.5 INJECTION, SOLUTION INTRAMUSCULAR; INTRAVENOUS; SUBCUTANEOUS ONCE
Status: COMPLETED | OUTPATIENT
Start: 2022-12-05 | End: 2022-12-05

## 2022-12-05 RX ORDER — ONDANSETRON 2 MG/ML
4 INJECTION INTRAMUSCULAR; INTRAVENOUS ONCE
Status: COMPLETED | OUTPATIENT
Start: 2022-12-05 | End: 2022-12-05

## 2022-12-05 RX ORDER — SODIUM CHLORIDE 0.9 % (FLUSH) 0.9 %
10 SYRINGE (ML) INJECTION AS NEEDED
Status: DISCONTINUED | OUTPATIENT
Start: 2022-12-05 | End: 2022-12-05 | Stop reason: HOSPADM

## 2022-12-05 RX ADMIN — SODIUM CHLORIDE 1000 ML: 9 INJECTION, SOLUTION INTRAVENOUS at 06:41

## 2022-12-05 RX ADMIN — HYDROMORPHONE HYDROCHLORIDE 0.5 MG: 1 INJECTION, SOLUTION INTRAMUSCULAR; INTRAVENOUS; SUBCUTANEOUS at 06:42

## 2022-12-05 RX ADMIN — IOPAMIDOL 98 ML: 612 INJECTION, SOLUTION INTRAVENOUS at 08:00

## 2022-12-05 RX ADMIN — ONDANSETRON 4 MG: 2 INJECTION INTRAMUSCULAR; INTRAVENOUS at 06:41

## 2022-12-05 NOTE — ED PROVIDER NOTES
"Subjective   History of Present Illness    47-year-old female presents for evaluation of multiple complaints.  She states that she has been experiencing a nagging cough for the past few days.  Yesterday she began experiencing epigastric abdominal pain radiating to her back that has persisted since that time.  The pain seems to be gradually worsening.  She currently rates it at 7 out of 10 in severity.  She did not know if the pain could potentially represent pneumonia or \"something more.\"  She was concerned about potentially straining and abdominal muscle from all of her coughing as well.  Given her symptoms, she came to the emergency department to be evaluated.  She denies any known exposures to anyone with COVID-19.        Review of Systems   HENT: Positive for congestion.    Respiratory: Positive for cough.    Gastrointestinal: Positive for abdominal pain.   All other systems reviewed and are negative.      Past Medical History:   Diagnosis Date   • Anxiety    • Depression    • Diverticulitis    • Eczema    • GERD (gastroesophageal reflux disease)    • Headache    • Hypertension    • Lung nodule    • Tobacco abuse        Allergies   Allergen Reactions   • Macrobid [Nitrofurantoin Macrocrystal] Hives and Shortness Of Breath       Past Surgical History:   Procedure Laterality Date   • BRONCHOSCOPY N/A 7/17/2019    Procedure: DARIN, RADIAL EBUS WITH FLUORO BRONCH;  Surgeon: Nikhil Knutson MD;  Location: [facility] ENDOSCOPY;  Service: Pulmonary   • CHOLECYSTECTOMY  1991   • COLONOSCOPY  2016, 2017   • ENDOMETRIAL ABLATION W/ NOVASURE  2010    WITH LAP BTL   • VAGINAL HYSTERECTOMY  2010    Partial        Family History   Problem Relation Age of Onset   • Breast cancer Paternal Aunt         40's   • Cancer Mother    • Diabetes Father    • Diabetes Brother    • Cancer Maternal Aunt    • Ovarian cancer Neg Hx        Social History     Socioeconomic History   • Marital status:    • Number of children: 1   Tobacco " Use   • Smoking status: Every Day     Packs/day: 0.25     Years: 30.00     Pack years: 7.50     Types: Cigarettes   • Smokeless tobacco: Never   • Tobacco comments:     5 a day    Substance and Sexual Activity   • Alcohol use: Yes     Alcohol/week: 1.0 standard drink     Types: 1 Cans of beer per week     Comment: socially   • Drug use: No   • Sexual activity: Defer           Objective   Physical Exam  Vitals and nursing note reviewed.   Constitutional:       General: She is not in acute distress.     Appearance: She is well-developed. She is not diaphoretic.      Comments: Nontoxic-appearing female   HENT:      Head: Normocephalic and atraumatic.   Cardiovascular:      Rate and Rhythm: Normal rate and regular rhythm.      Heart sounds: Normal heart sounds. No murmur heard.    No friction rub. No gallop.   Pulmonary:      Effort: Pulmonary effort is normal. No respiratory distress.      Breath sounds: Normal breath sounds. No wheezing or rales.   Abdominal:      General: Bowel sounds are normal. There is no distension.      Palpations: Abdomen is soft. There is no mass.      Tenderness: There is abdominal tenderness. There is guarding. There is no rebound.      Comments: Epigastric abdominal tenderness noted with guarding present, no pain out of proportion to exam, negative Calzada's sign   Genitourinary:     Comments: No CVA tenderness present  Musculoskeletal:         General: Normal range of motion.      Cervical back: Neck supple.   Skin:     General: Skin is warm and dry.      Findings: No erythema or rash.      Comments: No dermatomal rash noted   Neurological:      General: No focal deficit present.      Mental Status: She is alert and oriented to person, place, and time.   Psychiatric:         Mood and Affect: Mood normal.         Thought Content: Thought content normal.         Judgment: Judgment normal.         Procedures           ED Course  ED Course as of 12/05/22 1318   Mon Dec 05, 2022   06  47-year-old female presents for evaluation of multiple complaints.  The patient states that she has been experiencing a nagging cough for the past few days.  Yesterday she began experiencing epigastric abdominal pain radiating to her back that has persisted since that time and seems to be gradually worsening.  On arrival to the ED, the patient is nontoxic-appearing.  Vital signs are reassuring.  Exam remarkable for epigastric abdominal tenderness with guarding noted.  No pain out of proportion to exam.  The patient has had a prior cholecystectomy.  We will obtain labs and imaging, and we will reassess following initial interventions. [DD]   0710 I personally and independently viewed the patient's x-ray images myself, and I am in agreement with the radiologist's reading for final interpretation.   [DD]   0730 Labs are bland. [DD]   0819 CT of abdomen/pelvis is negative. [DD]   0828 Upon reevaluation, the patient looks and feels improved.  Reassured and counseled regarding symptomatic management.  She will follow-up with her primary care physician within the next week.  Agreeable with plan and given appropriate strict return precautions. [DD]   0832 Prescription for Tessalon Perles for cough. [DD]      ED Course User Index  [DD] Gio Mcgregor MD                                       Recent Results (from the past 24 hour(s))   ECG 12 Lead ED Triage Standing Order; Abdominal Pain (Upper)    Collection Time: 12/05/22  6:12 AM   Result Value Ref Range    QT Interval 384 ms    QTC Interval 442 ms   Comprehensive Metabolic Panel    Collection Time: 12/05/22  6:37 AM    Specimen: Blood   Result Value Ref Range    Glucose 102 (H) 65 - 99 mg/dL    BUN 7 6 - 20 mg/dL    Creatinine 0.49 (L) 0.57 - 1.00 mg/dL    Sodium 137 136 - 145 mmol/L    Potassium 4.1 3.5 - 5.2 mmol/L    Chloride 103 98 - 107 mmol/L    CO2 22.0 22.0 - 29.0 mmol/L    Calcium 8.8 8.6 - 10.5 mg/dL    Total Protein 6.4 6.0 - 8.5 g/dL    Albumin 3.80 3.50  - 5.20 g/dL    ALT (SGPT) 25 1 - 33 U/L    AST (SGOT) 27 1 - 32 U/L    Alkaline Phosphatase 67 39 - 117 U/L    Total Bilirubin 0.3 0.0 - 1.2 mg/dL    Globulin 2.6 gm/dL    A/G Ratio 1.5 g/dL    BUN/Creatinine Ratio 14.3 7.0 - 25.0    Anion Gap 12.0 5.0 - 15.0 mmol/L    eGFR 116.4 >60.0 mL/min/1.73   Lipase    Collection Time: 12/05/22  6:37 AM    Specimen: Blood   Result Value Ref Range    Lipase 24 13 - 60 U/L   Troponin    Collection Time: 12/05/22  6:37 AM    Specimen: Blood   Result Value Ref Range    Troponin T <0.010 0.000 - 0.030 ng/mL   Green Top (Gel)    Collection Time: 12/05/22  6:37 AM   Result Value Ref Range    Extra Tube Hold for add-ons.    Lavender Top    Collection Time: 12/05/22  6:37 AM   Result Value Ref Range    Extra Tube hold for add-on    Gold Top - SST    Collection Time: 12/05/22  6:37 AM   Result Value Ref Range    Extra Tube Hold for add-ons.    Gray Top    Collection Time: 12/05/22  6:37 AM   Result Value Ref Range    Extra Tube Hold for add-ons.    Light Blue Top    Collection Time: 12/05/22  6:37 AM   Result Value Ref Range    Extra Tube Hold for add-ons.    CBC Auto Differential    Collection Time: 12/05/22  6:37 AM    Specimen: Blood   Result Value Ref Range    WBC 5.77 3.40 - 10.80 10*3/mm3    RBC 3.79 3.77 - 5.28 10*6/mm3    Hemoglobin 11.7 (L) 12.0 - 15.9 g/dL    Hematocrit 35.4 34.0 - 46.6 %    MCV 93.4 79.0 - 97.0 fL    MCH 30.9 26.6 - 33.0 pg    MCHC 33.1 31.5 - 35.7 g/dL    RDW 14.2 12.3 - 15.4 %    RDW-SD 48.2 37.0 - 54.0 fl    MPV 9.1 6.0 - 12.0 fL    Platelets 250 140 - 450 10*3/mm3    Neutrophil % 53.6 42.7 - 76.0 %    Lymphocyte % 31.4 19.6 - 45.3 %    Monocyte % 10.4 5.0 - 12.0 %    Eosinophil % 3.8 0.3 - 6.2 %    Basophil % 0.5 0.0 - 1.5 %    Immature Grans % 0.3 0.0 - 0.5 %    Neutrophils, Absolute 3.09 1.70 - 7.00 10*3/mm3    Lymphocytes, Absolute 1.81 0.70 - 3.10 10*3/mm3    Monocytes, Absolute 0.60 0.10 - 0.90 10*3/mm3    Eosinophils, Absolute 0.22 0.00 - 0.40  10*3/mm3    Basophils, Absolute 0.03 0.00 - 0.20 10*3/mm3    Immature Grans, Absolute 0.02 0.00 - 0.05 10*3/mm3    nRBC 0.0 0.0 - 0.2 /100 WBC   COVID-19 and FLU A/B PCR - Swab, Nasopharynx    Collection Time: 12/05/22  6:50 AM    Specimen: Nasopharynx; Swab   Result Value Ref Range    COVID19 Not Detected Not Detected - Ref. Range    Influenza A PCR Not Detected Not Detected    Influenza B PCR Not Detected Not Detected   Urinalysis With Microscopic If Indicated (No Culture) - Urine, Clean Catch    Collection Time: 12/05/22  8:47 AM    Specimen: Urine, Clean Catch   Result Value Ref Range    Color, UA Yellow Yellow, Straw    Appearance, UA Clear Clear    pH, UA 5.5 5.0 - 8.0    Specific Gravity, UA 1.066 (H) 1.001 - 1.030    Glucose, UA Negative Negative    Ketones, UA Negative Negative    Bilirubin, UA Negative Negative    Blood, UA Small (1+) (A) Negative    Protein, UA Negative Negative    Leuk Esterase, UA Negative Negative    Nitrite, UA Negative Negative    Urobilinogen, UA 0.2 E.U./dL 0.2 - 1.0 E.U./dL   Urinalysis, Microscopic Only - Urine, Clean Catch    Collection Time: 12/05/22  8:47 AM    Specimen: Urine, Clean Catch   Result Value Ref Range    RBC, UA 7-12 (A) None Seen, 0-2 /HPF    WBC, UA 0-2 None Seen, 0-2 /HPF    Bacteria, UA None Seen None Seen, Trace /HPF    Squamous Epithelial Cells, UA 0-2 None Seen, 0-2 /HPF    Hyaline Casts, UA 0-6 0 - 6 /LPF    Methodology Automated Microscopy      Note: In addition to lab results from this visit, the labs listed above may include labs taken at another facility or during a different encounter within the last 24 hours. Please correlate lab times with ED admission and discharge times for further clarification of the services performed during this visit.    CT Abdomen Pelvis With Contrast   Final Result   1.  No findings to explain patient's symptoms on this CT exam.   2.  Hepatic steatosis.   3.  Status post cholecystectomy and hysterectomy. Postoperative  "changes   of left lower lobe.   4.  Diverticulosis without diverticulitis.               This report was finalized on 12/5/2022 8:15 AM by Belén Lazar MD.          XR Chest 1 View   Final Result      1.  Normal chest       Electronically signed by:  James Chung M.D.     12/5/2022 5:07 AM Mountain Time        Vitals:    12/05/22 0608 12/05/22 0900 12/05/22 0949 12/05/22 0959   BP: 158/90 137/85  128/82   BP Location: Left arm      Patient Position: Sitting      Pulse: 90 69  65   Resp: 16      Temp: 98.6 °F (37 °C)      TempSrc: Oral      SpO2: 96% 98% 97%    Weight: 79.4 kg (175 lb)      Height: 157.5 cm (62\")        Medications   sodium chloride 0.9 % bolus 1,000 mL (0 mL Intravenous Stopped 12/5/22 0844)   ondansetron (ZOFRAN) injection 4 mg (4 mg Intravenous Given 12/5/22 0641)   HYDROmorphone (DILAUDID) injection 0.5 mg (0.5 mg Intravenous Given 12/5/22 0642)   iopamidol (ISOVUE-300) 61 % injection 100 mL (98 mL Intravenous Given 12/5/22 0800)     ECG/EMG Results (last 24 hours)     Procedure Component Value Units Date/Time    ECG 12 Lead ED Triage Standing Order; Abdominal Pain (Upper) [[id number]] Collected: 12/05/22 0612     Updated: 12/05/22 0736     QT Interval 384 ms      QTC Interval 442 ms     Narrative:      Test Reason : ED Triage Standing Order~  Blood Pressure :   */*   mmHG  Vent. Rate :  80 BPM     Atrial Rate :  80 BPM     P-R Int : 146 ms          QRS Dur :  72 ms      QT Int : 384 ms       P-R-T Axes :  56  61  50 degrees     QTc Int : 442 ms    Normal sinus rhythm  Cannot rule out Anterior infarct , age undetermined  Abnormal ECG  When compared with ECG of 04-NOV-2022 20:03,  Nonspecific T wave abnormality no longer evident in Lateral leads    Referred By: EMILY           Confirmed By:         ECG 12 Lead ED Triage Standing Order; Abdominal Pain (Upper)   Preliminary Result   Test Reason : ED Triage Standing Order~   Blood Pressure :   */*   mmHG   Vent. Rate :  80 BPM     Atrial Rate :  " 80 BPM      P-R Int : 146 ms          QRS Dur :  72 ms       QT Int : 384 ms       P-R-T Axes :  56  61  50 degrees      QTc Int : 442 ms      Normal sinus rhythm   Cannot rule out Anterior infarct , age undetermined   Abnormal ECG   When compared with ECG of 04-NOV-2022 20:03,   Nonspecific T wave abnormality no longer evident in Lateral leads      Referred By: EMILY           Confirmed By:                  JANIE    Final diagnoses:   Upper abdominal pain   Viral illness       ED Disposition  ED Disposition     ED Disposition   Discharge    Condition   Stable    Comment   --             Anastacia Jose MD  [address] [place] [address] [address]  [place] [address]  [phone]    In 1 week           Medication List      New Prescriptions    benzonatate 100 MG capsule  Commonly known as: TESSALON  Take 1 capsule by mouth 3 (Three) Times a Day As Needed for Cough.           Where to Get Your Medications      These medications were sent to [facility] [id number] - [place], [place] - [address] [place] [address] - [phone]  - [phone]   [address] [facility] [address], [place] [address]    Phone: [phone]   · benzonatate 100 MG capsule          Gio Mcgregor MD  12/05/22 1854

## 2023-03-20 ENCOUNTER — APPOINTMENT (OUTPATIENT)
Dept: CT IMAGING | Facility: HOSPITAL | Age: 48
End: 2023-03-20
Payer: COMMERCIAL

## 2023-03-20 ENCOUNTER — HOSPITAL ENCOUNTER (OUTPATIENT)
Facility: HOSPITAL | Age: 48
Setting detail: OBSERVATION
Discharge: HOME | End: 2023-03-21
Attending: STUDENT IN AN ORGANIZED HEALTH CARE EDUCATION/TRAINING PROGRAM | Admitting: INTERNAL MEDICINE
Payer: COMMERCIAL

## 2023-03-20 DIAGNOSIS — Z87.19 HISTORY OF GASTROESOPHAGEAL REFLUX (GERD): ICD-10-CM

## 2023-03-20 DIAGNOSIS — R11.2 NAUSEA AND VOMITING, UNSPECIFIED VOMITING TYPE: ICD-10-CM

## 2023-03-20 DIAGNOSIS — Z86.59 HISTORY OF ANXIETY: ICD-10-CM

## 2023-03-20 DIAGNOSIS — K52.9 COLITIS PRESUMED INFECTIOUS: Primary | ICD-10-CM

## 2023-03-20 DIAGNOSIS — Z87.19 HISTORY OF DIVERTICULITIS: ICD-10-CM

## 2023-03-20 DIAGNOSIS — Z86.59 HISTORY OF DEPRESSION: ICD-10-CM

## 2023-03-20 DIAGNOSIS — Z86.79 HISTORY OF HYPERTENSION: ICD-10-CM

## 2023-03-20 PROBLEM — R73.9 HYPERGLYCEMIA: Status: ACTIVE | Noted: 2023-03-20

## 2023-03-20 LAB
ADV 40+41 DNA STL QL NAA+NON-PROBE: NOT DETECTED
ALBUMIN SERPL-MCNC: 4.5 G/DL (ref 3.5–5.2)
ALBUMIN/GLOB SERPL: 1.5 G/DL
ALP SERPL-CCNC: 62 U/L (ref 39–117)
ALT SERPL W P-5'-P-CCNC: 25 U/L (ref 1–33)
AMPHET+METHAMPHET UR QL: NEGATIVE
AMPHETAMINES UR QL: NEGATIVE
ANION GAP SERPL CALCULATED.3IONS-SCNC: 19 MMOL/L (ref 5–15)
AST SERPL-CCNC: 20 U/L (ref 1–32)
ASTRO TYP 1-8 RNA STL QL NAA+NON-PROBE: NOT DETECTED
B PARAPERT DNA SPEC QL NAA+PROBE: NOT DETECTED
B PERT DNA SPEC QL NAA+PROBE: NOT DETECTED
BACTERIA UR QL AUTO: ABNORMAL /HPF
BARBITURATES UR QL SCN: NEGATIVE
BASOPHILS # BLD AUTO: 0.03 10*3/MM3 (ref 0–0.2)
BASOPHILS NFR BLD AUTO: 0.2 % (ref 0–1.5)
BENZODIAZ UR QL SCN: NEGATIVE
BILIRUB SERPL-MCNC: 0.8 MG/DL (ref 0–1.2)
BILIRUB UR QL STRIP: NEGATIVE
BUN SERPL-MCNC: 16 MG/DL (ref 6–20)
BUN/CREAT SERPL: 19.8 (ref 7–25)
BUPRENORPHINE SERPL-MCNC: NEGATIVE NG/ML
C CAYETANENSIS DNA STL QL NAA+NON-PROBE: NOT DETECTED
C COLI+JEJ+UPSA DNA STL QL NAA+NON-PROBE: NOT DETECTED
C DIFF TOX GENS STL QL NAA+PROBE: NOT DETECTED
C PNEUM DNA NPH QL NAA+NON-PROBE: NOT DETECTED
CALCIUM SPEC-SCNC: 9.8 MG/DL (ref 8.6–10.5)
CANNABINOIDS SERPL QL: NEGATIVE
CHLORIDE SERPL-SCNC: 101 MMOL/L (ref 98–107)
CLARITY UR: CLEAR
CLUMPED PLATELETS: PRESENT
CO2 SERPL-SCNC: 18 MMOL/L (ref 22–29)
COCAINE UR QL: NEGATIVE
COLOR UR: YELLOW
CREAT SERPL-MCNC: 0.81 MG/DL (ref 0.57–1)
CRP SERPL-MCNC: 1.43 MG/DL (ref 0–0.5)
CRYPTOSP DNA STL QL NAA+NON-PROBE: NOT DETECTED
D-LACTATE SERPL-SCNC: 2 MMOL/L (ref 0.5–2)
D-LACTATE SERPL-SCNC: 2.7 MMOL/L (ref 0.5–2)
D-LACTATE SERPL-SCNC: 3.5 MMOL/L (ref 0.5–2)
D-LACTATE SERPL-SCNC: 3.9 MMOL/L (ref 0.5–2)
D-LACTATE SERPL-SCNC: 5.2 MMOL/L (ref 0.5–2)
D-LACTATE SERPL-SCNC: 5.8 MMOL/L (ref 0.5–2)
DEPRECATED RDW RBC AUTO: 43 FL (ref 37–54)
E HISTOLYT DNA STL QL NAA+NON-PROBE: NOT DETECTED
EAEC PAA PLAS AGGR+AATA ST NAA+NON-PRB: NOT DETECTED
EC STX1+STX2 GENES STL QL NAA+NON-PROBE: NOT DETECTED
EGFRCR SERPLBLD CKD-EPI 2021: 89.7 ML/MIN/1.73
EOSINOPHIL # BLD AUTO: 0.15 10*3/MM3 (ref 0–0.4)
EOSINOPHIL NFR BLD AUTO: 1 % (ref 0.3–6.2)
EPEC EAE GENE STL QL NAA+NON-PROBE: NOT DETECTED
ERYTHROCYTE [DISTWIDTH] IN BLOOD BY AUTOMATED COUNT: 13.2 % (ref 12.3–15.4)
ERYTHROCYTE [SEDIMENTATION RATE] IN BLOOD: 20 MM/HR (ref 0–20)
ETEC LTA+ST1A+ST1B TOX ST NAA+NON-PROBE: NOT DETECTED
FLUAV SUBTYP SPEC NAA+PROBE: NOT DETECTED
FLUBV RNA ISLT QL NAA+PROBE: NOT DETECTED
G LAMBLIA DNA STL QL NAA+NON-PROBE: NOT DETECTED
GLOBULIN UR ELPH-MCNC: 3 GM/DL
GLUCOSE SERPL-MCNC: 153 MG/DL (ref 65–99)
GLUCOSE UR STRIP-MCNC: NEGATIVE MG/DL
HADV DNA SPEC NAA+PROBE: NOT DETECTED
HBA1C MFR BLD: 5.7 % (ref 4.8–5.6)
HCOV 229E RNA SPEC QL NAA+PROBE: NOT DETECTED
HCOV HKU1 RNA SPEC QL NAA+PROBE: NOT DETECTED
HCOV NL63 RNA SPEC QL NAA+PROBE: NOT DETECTED
HCOV OC43 RNA SPEC QL NAA+PROBE: NOT DETECTED
HCT VFR BLD AUTO: 44.2 % (ref 34–46.6)
HGB BLD-MCNC: 15 G/DL (ref 12–15.9)
HGB UR QL STRIP.AUTO: ABNORMAL
HMPV RNA NPH QL NAA+NON-PROBE: NOT DETECTED
HOLD SPECIMEN: NORMAL
HPIV1 RNA ISLT QL NAA+PROBE: NOT DETECTED
HPIV2 RNA SPEC QL NAA+PROBE: NOT DETECTED
HPIV3 RNA NPH QL NAA+PROBE: NOT DETECTED
HPIV4 P GENE NPH QL NAA+PROBE: NOT DETECTED
HYALINE CASTS UR QL AUTO: ABNORMAL /LPF
IMM GRANULOCYTES # BLD AUTO: 0.07 10*3/MM3 (ref 0–0.05)
IMM GRANULOCYTES NFR BLD AUTO: 0.5 % (ref 0–0.5)
KETONES UR QL STRIP: ABNORMAL
LEUKOCYTE ESTERASE UR QL STRIP.AUTO: NEGATIVE
LIPASE SERPL-CCNC: 20 U/L (ref 13–60)
LYMPHOCYTES # BLD AUTO: 1.3 10*3/MM3 (ref 0.7–3.1)
LYMPHOCYTES NFR BLD AUTO: 8.4 % (ref 19.6–45.3)
M PNEUMO IGG SER IA-ACNC: NOT DETECTED
MCH RBC QN AUTO: 30.4 PG (ref 26.6–33)
MCHC RBC AUTO-ENTMCNC: 33.9 G/DL (ref 31.5–35.7)
MCV RBC AUTO: 89.5 FL (ref 79–97)
METHADONE UR QL SCN: NEGATIVE
MONOCYTES # BLD AUTO: 0.79 10*3/MM3 (ref 0.1–0.9)
MONOCYTES NFR BLD AUTO: 5.1 % (ref 5–12)
NEUTROPHILS NFR BLD AUTO: 13.2 10*3/MM3 (ref 1.7–7)
NEUTROPHILS NFR BLD AUTO: 84.8 % (ref 42.7–76)
NITRITE UR QL STRIP: NEGATIVE
NOROVIRUS GI+II RNA STL QL NAA+NON-PROBE: DETECTED
NRBC BLD AUTO-RTO: 0 /100 WBC (ref 0–0.2)
OPIATES UR QL: NEGATIVE
OXYCODONE UR QL SCN: NEGATIVE
P SHIGELLOIDES DNA STL QL NAA+NON-PROBE: NOT DETECTED
PCP UR QL SCN: NEGATIVE
PH UR STRIP.AUTO: <=5 [PH] (ref 5–8)
PLATELET # BLD AUTO: 262 10*3/MM3 (ref 140–450)
PMV BLD AUTO: 9.4 FL (ref 6–12)
POTASSIUM SERPL-SCNC: 3.8 MMOL/L (ref 3.5–5.2)
PROCALCITONIN SERPL-MCNC: 0.43 NG/ML (ref 0–0.25)
PROPOXYPH UR QL: NEGATIVE
PROT SERPL-MCNC: 7.5 G/DL (ref 6–8.5)
PROT UR QL STRIP: NEGATIVE
QT INTERVAL: 0 MS
QTC INTERVAL: 0 MS
RBC # BLD AUTO: 4.94 10*6/MM3 (ref 3.77–5.28)
RBC # UR STRIP: ABNORMAL /HPF
RBC MORPH BLD: NORMAL
REF LAB TEST METHOD: ABNORMAL
RHINOVIRUS RNA SPEC NAA+PROBE: NOT DETECTED
RSV RNA NPH QL NAA+NON-PROBE: NOT DETECTED
RVA RNA STL QL NAA+NON-PROBE: NOT DETECTED
S ENT+BONG DNA STL QL NAA+NON-PROBE: NOT DETECTED
SAPO I+II+IV+V RNA STL QL NAA+NON-PROBE: NOT DETECTED
SARS-COV-2 RNA NPH QL NAA+NON-PROBE: NOT DETECTED
SHIGELLA SP+EIEC IPAH ST NAA+NON-PROBE: NOT DETECTED
SODIUM SERPL-SCNC: 138 MMOL/L (ref 136–145)
SP GR UR STRIP: 1.08 (ref 1–1.03)
SQUAMOUS #/AREA URNS HPF: ABNORMAL /HPF
TRICYCLICS UR QL SCN: NEGATIVE
TROPONIN T SERPL HS-MCNC: 8 NG/L
UROBILINOGEN UR QL STRIP: ABNORMAL
V CHOL+PARA+VUL DNA STL QL NAA+NON-PROBE: NOT DETECTED
V CHOLERAE DNA STL QL NAA+NON-PROBE: NOT DETECTED
WBC # UR STRIP: ABNORMAL /HPF
WBC MORPH BLD: NORMAL
WBC NRBC COR # BLD: 15.54 10*3/MM3 (ref 3.4–10.8)
WHOLE BLOOD HOLD COAG: NORMAL
WHOLE BLOOD HOLD SPECIMEN: NORMAL
Y ENTEROCOL DNA STL QL NAA+NON-PROBE: NOT DETECTED

## 2023-03-20 PROCEDURE — 99223 1ST HOSP IP/OBS HIGH 75: CPT | Performed by: INTERNAL MEDICINE

## 2023-03-20 PROCEDURE — 93005 ELECTROCARDIOGRAM TRACING: CPT

## 2023-03-20 PROCEDURE — 83036 HEMOGLOBIN GLYCOSYLATED A1C: CPT | Performed by: PHYSICIAN ASSISTANT

## 2023-03-20 PROCEDURE — 81001 URINALYSIS AUTO W/SCOPE: CPT

## 2023-03-20 PROCEDURE — 85652 RBC SED RATE AUTOMATED: CPT | Performed by: INTERNAL MEDICINE

## 2023-03-20 PROCEDURE — 96375 TX/PRO/DX INJ NEW DRUG ADDON: CPT

## 2023-03-20 PROCEDURE — 83605 ASSAY OF LACTIC ACID: CPT | Performed by: PHYSICIAN ASSISTANT

## 2023-03-20 PROCEDURE — 85007 BL SMEAR W/DIFF WBC COUNT: CPT

## 2023-03-20 PROCEDURE — 87040 BLOOD CULTURE FOR BACTERIA: CPT | Performed by: PHYSICIAN ASSISTANT

## 2023-03-20 PROCEDURE — 86140 C-REACTIVE PROTEIN: CPT | Performed by: INTERNAL MEDICINE

## 2023-03-20 PROCEDURE — 84484 ASSAY OF TROPONIN QUANT: CPT

## 2023-03-20 PROCEDURE — G0378 HOSPITAL OBSERVATION PER HR: HCPCS

## 2023-03-20 PROCEDURE — 36415 COLL VENOUS BLD VENIPUNCTURE: CPT

## 2023-03-20 PROCEDURE — 93010 ELECTROCARDIOGRAM REPORT: CPT | Performed by: INTERNAL MEDICINE

## 2023-03-20 PROCEDURE — 96361 HYDRATE IV INFUSION ADD-ON: CPT

## 2023-03-20 PROCEDURE — 84145 PROCALCITONIN (PCT): CPT | Performed by: INTERNAL MEDICINE

## 2023-03-20 PROCEDURE — 25010000002 ONDANSETRON PER 1 MG: Performed by: PHYSICIAN ASSISTANT

## 2023-03-20 PROCEDURE — 83690 ASSAY OF LIPASE: CPT

## 2023-03-20 PROCEDURE — 85025 COMPLETE CBC W/AUTO DIFF WBC: CPT

## 2023-03-20 PROCEDURE — 74177 CT ABD & PELVIS W/CONTRAST: CPT

## 2023-03-20 PROCEDURE — 99284 EMERGENCY DEPT VISIT MOD MDM: CPT

## 2023-03-20 PROCEDURE — 25010000002 DROPERIDOL PER 5 MG: Performed by: PHYSICIAN ASSISTANT

## 2023-03-20 PROCEDURE — 25510000001 IOPAMIDOL 61 % SOLUTION: Performed by: STUDENT IN AN ORGANIZED HEALTH CARE EDUCATION/TRAINING PROGRAM

## 2023-03-20 PROCEDURE — 25010000002 PIPERACILLIN SOD-TAZOBACTAM PER 1 G: Performed by: PHYSICIAN ASSISTANT

## 2023-03-20 PROCEDURE — 93005 ELECTROCARDIOGRAM TRACING: CPT | Performed by: INTERNAL MEDICINE

## 2023-03-20 PROCEDURE — 96374 THER/PROPH/DIAG INJ IV PUSH: CPT

## 2023-03-20 PROCEDURE — 80053 COMPREHEN METABOLIC PANEL: CPT

## 2023-03-20 PROCEDURE — 99222 1ST HOSP IP/OBS MODERATE 55: CPT | Performed by: NURSE PRACTITIONER

## 2023-03-20 PROCEDURE — 0202U NFCT DS 22 TRGT SARS-COV-2: CPT | Performed by: INTERNAL MEDICINE

## 2023-03-20 PROCEDURE — 80306 DRUG TEST PRSMV INSTRMNT: CPT | Performed by: PHYSICIAN ASSISTANT

## 2023-03-20 PROCEDURE — 99285 EMERGENCY DEPT VISIT HI MDM: CPT

## 2023-03-20 PROCEDURE — 87493 C DIFF AMPLIFIED PROBE: CPT | Performed by: INTERNAL MEDICINE

## 2023-03-20 PROCEDURE — 83605 ASSAY OF LACTIC ACID: CPT | Performed by: INTERNAL MEDICINE

## 2023-03-20 PROCEDURE — 87507 IADNA-DNA/RNA PROBE TQ 12-25: CPT | Performed by: INTERNAL MEDICINE

## 2023-03-20 RX ORDER — CHOLECALCIFEROL (VITAMIN D3) 125 MCG
5 CAPSULE ORAL NIGHTLY PRN
Status: DISCONTINUED | OUTPATIENT
Start: 2023-03-20 | End: 2023-03-21 | Stop reason: HOSPADM

## 2023-03-20 RX ORDER — SODIUM CHLORIDE, SODIUM LACTATE, POTASSIUM CHLORIDE, CALCIUM CHLORIDE 600; 310; 30; 20 MG/100ML; MG/100ML; MG/100ML; MG/100ML
150 INJECTION, SOLUTION INTRAVENOUS CONTINUOUS
Status: ACTIVE | OUTPATIENT
Start: 2023-03-20 | End: 2023-03-21

## 2023-03-20 RX ORDER — FAMOTIDINE 10 MG/ML
20 INJECTION, SOLUTION INTRAVENOUS ONCE
Status: COMPLETED | OUTPATIENT
Start: 2023-03-20 | End: 2023-03-20

## 2023-03-20 RX ORDER — ONDANSETRON 2 MG/ML
4 INJECTION INTRAMUSCULAR; INTRAVENOUS EVERY 6 HOURS PRN
Status: DISCONTINUED | OUTPATIENT
Start: 2023-03-20 | End: 2023-03-20

## 2023-03-20 RX ORDER — ONDANSETRON 4 MG/1
4 TABLET, FILM COATED ORAL EVERY 6 HOURS PRN
Status: DISCONTINUED | OUTPATIENT
Start: 2023-03-20 | End: 2023-03-21 | Stop reason: HOSPADM

## 2023-03-20 RX ORDER — DROPERIDOL 2.5 MG/ML
2.5 INJECTION, SOLUTION INTRAMUSCULAR; INTRAVENOUS ONCE
Status: COMPLETED | OUTPATIENT
Start: 2023-03-20 | End: 2023-03-20

## 2023-03-20 RX ORDER — SODIUM CHLORIDE 0.9 % (FLUSH) 0.9 %
10 SYRINGE (ML) INJECTION AS NEEDED
Status: DISCONTINUED | OUTPATIENT
Start: 2023-03-20 | End: 2023-03-21 | Stop reason: HOSPADM

## 2023-03-20 RX ORDER — HYDROCODONE BITARTRATE AND ACETAMINOPHEN 5; 325 MG/1; MG/1
1 TABLET ORAL EVERY 6 HOURS PRN
Status: DISCONTINUED | OUTPATIENT
Start: 2023-03-20 | End: 2023-03-21 | Stop reason: HOSPADM

## 2023-03-20 RX ORDER — MORPHINE SULFATE 4 MG/ML
3 INJECTION, SOLUTION INTRAMUSCULAR; INTRAVENOUS
Status: DISCONTINUED | OUTPATIENT
Start: 2023-03-20 | End: 2023-03-21 | Stop reason: HOSPADM

## 2023-03-20 RX ORDER — ACETAMINOPHEN 325 MG/1
650 TABLET ORAL EVERY 6 HOURS PRN
Status: DISCONTINUED | OUTPATIENT
Start: 2023-03-20 | End: 2023-03-21 | Stop reason: HOSPADM

## 2023-03-20 RX ORDER — DESVENLAFAXINE SUCCINATE 50 MG/1
50 TABLET, EXTENDED RELEASE ORAL DAILY
Status: DISCONTINUED | OUTPATIENT
Start: 2023-03-20 | End: 2023-03-21 | Stop reason: HOSPADM

## 2023-03-20 RX ORDER — ONDANSETRON 2 MG/ML
4 INJECTION INTRAMUSCULAR; INTRAVENOUS EVERY 6 HOURS PRN
Status: DISCONTINUED | OUTPATIENT
Start: 2023-03-20 | End: 2023-03-21 | Stop reason: HOSPADM

## 2023-03-20 RX ORDER — SODIUM CHLORIDE 0.9 % (FLUSH) 0.9 %
10 SYRINGE (ML) INJECTION EVERY 12 HOURS SCHEDULED
Status: DISCONTINUED | OUTPATIENT
Start: 2023-03-20 | End: 2023-03-21 | Stop reason: HOSPADM

## 2023-03-20 RX ORDER — VANCOMYCIN HYDROCHLORIDE 125 MG/1
125 CAPSULE ORAL EVERY 6 HOURS SCHEDULED
Status: DISCONTINUED | OUTPATIENT
Start: 2023-03-20 | End: 2023-03-20

## 2023-03-20 RX ORDER — SODIUM CHLORIDE, SODIUM LACTATE, POTASSIUM CHLORIDE, CALCIUM CHLORIDE 600; 310; 30; 20 MG/100ML; MG/100ML; MG/100ML; MG/100ML
150 INJECTION, SOLUTION INTRAVENOUS CONTINUOUS
Status: DISCONTINUED | OUTPATIENT
Start: 2023-03-20 | End: 2023-03-21 | Stop reason: HOSPADM

## 2023-03-20 RX ORDER — SODIUM CHLORIDE 9 MG/ML
40 INJECTION, SOLUTION INTRAVENOUS AS NEEDED
Status: DISCONTINUED | OUTPATIENT
Start: 2023-03-20 | End: 2023-03-21 | Stop reason: HOSPADM

## 2023-03-20 RX ORDER — LISINOPRIL 5 MG/1
5 TABLET ORAL DAILY
COMMUNITY

## 2023-03-20 RX ORDER — NALOXONE HCL 0.4 MG/ML
0.4 VIAL (ML) INJECTION AS NEEDED
Status: DISCONTINUED | OUTPATIENT
Start: 2023-03-20 | End: 2023-03-21 | Stop reason: HOSPADM

## 2023-03-20 RX ORDER — SODIUM CHLORIDE 0.9 % (FLUSH) 0.9 %
10 SYRINGE (ML) INJECTION AS NEEDED
Status: DISCONTINUED | OUTPATIENT
Start: 2023-03-20 | End: 2023-03-20

## 2023-03-20 RX ADMIN — HYDROCODONE BITARTRATE AND ACETAMINOPHEN 1 TABLET: 5; 325 TABLET ORAL at 14:27

## 2023-03-20 RX ADMIN — ONDANSETRON 4 MG: 2 INJECTION INTRAMUSCULAR; INTRAVENOUS at 11:11

## 2023-03-20 RX ADMIN — SODIUM CHLORIDE, POTASSIUM CHLORIDE, SODIUM LACTATE AND CALCIUM CHLORIDE 150 ML/HR: 600; 310; 30; 20 INJECTION, SOLUTION INTRAVENOUS at 23:02

## 2023-03-20 RX ADMIN — DESVENLAFAXINE 50 MG: 50 TABLET, FILM COATED, EXTENDED RELEASE ORAL at 12:21

## 2023-03-20 RX ADMIN — SODIUM CHLORIDE, POTASSIUM CHLORIDE, SODIUM LACTATE AND CALCIUM CHLORIDE 150 ML/HR: 600; 310; 30; 20 INJECTION, SOLUTION INTRAVENOUS at 15:53

## 2023-03-20 RX ADMIN — FAMOTIDINE 20 MG: 10 INJECTION INTRAVENOUS at 02:20

## 2023-03-20 RX ADMIN — SODIUM CHLORIDE 1000 ML: 9 INJECTION, SOLUTION INTRAVENOUS at 02:20

## 2023-03-20 RX ADMIN — HYDROCODONE BITARTRATE AND ACETAMINOPHEN 1 TABLET: 5; 325 TABLET ORAL at 20:36

## 2023-03-20 RX ADMIN — Medication 10 ML: at 20:36

## 2023-03-20 RX ADMIN — SODIUM CHLORIDE, POTASSIUM CHLORIDE, SODIUM LACTATE AND CALCIUM CHLORIDE 1000 ML: 600; 310; 30; 20 INJECTION, SOLUTION INTRAVENOUS at 06:31

## 2023-03-20 RX ADMIN — DROPERIDOL 2.5 MG: 2.5 INJECTION, SOLUTION INTRAMUSCULAR; INTRAVENOUS at 01:48

## 2023-03-20 RX ADMIN — Medication 5 MG: at 20:36

## 2023-03-20 RX ADMIN — SODIUM CHLORIDE, POTASSIUM CHLORIDE, SODIUM LACTATE AND CALCIUM CHLORIDE 1000 ML: 600; 310; 30; 20 INJECTION, SOLUTION INTRAVENOUS at 02:20

## 2023-03-20 RX ADMIN — VANCOMYCIN HYDROCHLORIDE 125 MG: 125 CAPSULE ORAL at 06:31

## 2023-03-20 RX ADMIN — SODIUM CHLORIDE, POTASSIUM CHLORIDE, SODIUM LACTATE AND CALCIUM CHLORIDE 150 ML/HR: 600; 310; 30; 20 INJECTION, SOLUTION INTRAVENOUS at 10:01

## 2023-03-20 RX ADMIN — TAZOBACTAM SODIUM AND PIPERACILLIN SODIUM 3.38 G: 375; 3 INJECTION, SOLUTION INTRAVENOUS at 06:31

## 2023-03-20 RX ADMIN — VANCOMYCIN HYDROCHLORIDE 125 MG: 125 CAPSULE ORAL at 11:11

## 2023-03-20 RX ADMIN — IOPAMIDOL 100 ML: 612 INJECTION, SOLUTION INTRAVENOUS at 02:55

## 2023-03-20 NOTE — CONSULTS
[facility] Gastroenterology Consult    Referring Provider: No ref. provider found    PCP: Anastacia Jose MD    Reason for Consultation: Colitis, question return of C. difficile    Chief complaint: Abdominal pain, nausea and vomiting    History of present illness:    Mishel Freeman is a 47 y.o. female who is admitted with a 2-day onset of worsening epigastric abdominal pain and cramps with associated nausea, vomiting, and profound watery diarrhea.  Patient has recently began acutely yesterday with pain, cramping associated nausea vomiting for diarrhea began overnight.  Also notes some low-grade temperatures with onset and general fatigue and malaise.  Does not endorse any shortness of breath, chest pain, or acute fever/chills.  Reports symptoms similar to this in the past was attributed to C. difficile and norovirus.  Denies any recent ill contacts, international travel, or well water consumption.  CT imaging concerning for transverse colon acute diarrheal state concerning for mild colitis.  GI PCR panel has resulted positive for norovirus. Past medical, surgical, social, and family histories are reviewed for accuracy.  No documented alleviating or exacerbating factors.  Does not endorse pain at time of exam.    Allergies:  Macrobid [nitrofurantoin macrocrystal]    Scheduled Meds:  desvenlafaxine, 50 mg, Oral, Daily  sodium chloride, 10 mL, Intravenous, Q12H         Infusions:  lactated ringers, 150 mL/hr, Last Rate: 150 mL/hr (03/20/23 1517)  lactated ringers, 150 mL/hr, Last Rate: 150 mL/hr (03/20/23 1553)  Pharmacy Consult,         PRN Meds:  •  acetaminophen  •  HYDROcodone-acetaminophen  •  melatonin  •  Morphine  •  naloxone  •  ondansetron **OR** ondansetron  •  Pharmacy Consult  •  sodium chloride  •  sodium chloride    Home Meds:  Medications Prior to Admission   Medication Sig Dispense Refill Last Dose   • desvenlafaxine (PRISTIQ) 50 MG 24 hr tablet Take 2 tablets by mouth Daily. Pt taking 100 mg  daily   3/19/2023   • vilazodone (VIIBRYD) 10 MG tablet tablet Take 1 tablet by mouth Daily.   3/19/2023   • acetaminophen (TYLENOL) 500 MG tablet Take 2 tablets by mouth Every 6 (Six) Hours As Needed for Mild Pain  or Moderate Pain . 30 tablet 0    • lisinopril (PRINIVIL,ZESTRIL) 5 MG tablet Take 1 tablet by mouth Daily.          ROS: Review of Systems   Constitutional: Positive for activity change, appetite change and fatigue. Negative for chills, diaphoresis, fever and unexpected weight change.   HENT: Negative for sore throat, trouble swallowing and voice change.    Eyes: Negative.    Respiratory: Negative.    Cardiovascular: Negative.    Gastrointestinal: Positive for abdominal distention, abdominal pain, diarrhea, nausea and vomiting. Negative for anal bleeding, blood in stool, constipation and rectal pain.   Endocrine: Negative.    Genitourinary: Negative.    Musculoskeletal: Negative.    Allergic/Immunologic: Negative.    Neurological: Negative.    Hematological: Negative.    Psychiatric/Behavioral: Negative.    All other systems reviewed and are negative.      PAST MED HX:  Past Medical History:   Diagnosis Date   • Anxiety    • Depression    • Diverticulitis    • Eczema    • GERD (gastroesophageal reflux disease)    • Headache    • Hypertension    • Lung nodule    • Tobacco abuse        PAST SURG HX:  Past Surgical History:   Procedure Laterality Date   • BRONCHOSCOPY N/A 7/17/2019    Procedure: DARIN, RADIAL EBUS WITH FLUORO BRONCH;  Surgeon: Nikhil Knutson MD;  Location: [facility] ENDOSCOPY;  Service: Pulmonary   • CHOLECYSTECTOMY  1991   • COLONOSCOPY  2016, 2017   • ENDOMETRIAL ABLATION W/ NOVASURE  2010    WITH LAP BTL   • VAGINAL HYSTERECTOMY  2010    Partial        FAM HX:  Family History   Problem Relation Age of Onset   • Breast cancer Paternal Aunt         40's   • Cancer Mother    • Diabetes Father    • Diabetes Brother    • Cancer Maternal Aunt    • Ovarian cancer Neg Hx        SOC HX:  Social  "History     Socioeconomic History   • Marital status:    • Number of children: 1   Tobacco Use   • Smoking status: Every Day     Packs/day: 0.25     Years: 30.00     Pack years: 7.50     Types: Cigarettes   • Smokeless tobacco: Never   • Tobacco comments:     5 a day    Vaping Use   • Vaping Use: Never used   Substance and Sexual Activity   • Alcohol use: Yes     Alcohol/week: 1.0 standard drink     Types: 1 Cans of beer per week     Comment: socially   • Drug use: No   • Sexual activity: Defer       PHYSICAL EXAM  /93 (BP Location: Left arm, Patient Position: Lying)   Pulse 99   Temp 97.8 °F (36.6 °C) (Oral)   Resp 16   Ht 157.5 cm (62\")   Wt 80.7 kg (178 lb)   SpO2 95%   BMI 32.56 kg/m²   Wt Readings from Last 3 Encounters:   03/20/23 80.7 kg (178 lb)   12/05/22 79.4 kg (175 lb)   11/04/22 82.6 kg (182 lb)   ,body mass index is 32.56 kg/m².  Physical Exam  Vitals and nursing note reviewed.   Constitutional:       General: She is not in acute distress.     Appearance: Normal appearance. She is normal weight. She is not ill-appearing or toxic-appearing.   HENT:      Head: Normocephalic and atraumatic.   Eyes:      General: No scleral icterus.     Extraocular Movements: Extraocular movements intact.      Conjunctiva/sclera: Conjunctivae normal.      Pupils: Pupils are equal, round, and reactive to light.   Cardiovascular:      Rate and Rhythm: Normal rate and regular rhythm.      Pulses: Normal pulses.      Heart sounds: Normal heart sounds.   Pulmonary:      Effort: Pulmonary effort is normal. No respiratory distress.      Breath sounds: Normal breath sounds.   Abdominal:      General: Abdomen is flat. Bowel sounds are normal. There is no distension.      Palpations: Abdomen is soft. There is no mass.      Tenderness: There is no abdominal tenderness. There is no guarding or rebound.      Hernia: No hernia is present.   Genitourinary:     Comments: defer  Musculoskeletal:         General: Normal " range of motion.      Right lower leg: No edema.      Left lower leg: No edema.   Skin:     General: Skin is warm and dry.      Capillary Refill: Capillary refill takes less than 2 seconds.      Coloration: Skin is not jaundiced or pale.   Neurological:      General: No focal deficit present.      Mental Status: She is alert and oriented to person, place, and time.   Psychiatric:         Mood and Affect: Mood normal.         Behavior: Behavior normal.         Thought Content: Thought content normal.         Judgment: Judgment normal.         Results Review:   I reviewed the patient's new clinical results.  I reviewed the patient's new imaging results and agree with the interpretation.  I personally viewed and interpreted the patient's EKG/Telemetry data    Lab Results   Component Value Date    WBC 15.54 (H) 03/20/2023    HGB 15.0 03/20/2023    HGB 11.7 (L) 12/05/2022    HGB 12.1 11/04/2022    HCT 44.2 03/20/2023    MCV 89.5 03/20/2023     03/20/2023       Lab Results   Component Value Date    INR 1.04 07/16/2019       Lab Results   Component Value Date    GLUCOSE 153 (H) 03/20/2023    BUN 16 03/20/2023    CREATININE 0.81 03/20/2023    EGFRIFNONA 80 07/24/2020    BCR 19.8 03/20/2023     03/20/2023    K 3.8 03/20/2023    CO2 18.0 (L) 03/20/2023    CALCIUM 9.8 03/20/2023    ALBUMIN 4.5 03/20/2023    ALKPHOS 62 03/20/2023    BILITOT 0.8 03/20/2023    ALT 25 03/20/2023    AST 20 03/20/2023       CT Abdomen Pelvis With Contrast    Result Date: 3/20/2023  EXAMINATION: CT ABDOMEN AND PELVIS WITH IV CONTRAST   DATE OF EXAMINATION: 8/20/2023. COMPARISON: 12/5/2022.. INDICATION: Abdominal pain with nausea and vomiting. PROCEDURE:  Axial CT of the abdomen and pelvis was performed with contrast and sagittal and coronal reformatted images were performed.  100 mm of Isovue-300 was given intravenously. CT dose lowering techniques were used, to include: automated exposure control, adjustment for patient size, and/or use  of iterative reconstruction. FINDINGS: LOWER CHEST :  The visualized lung bases are clear.  There are no pleural or pericardial effusions. ABDOMEN: Liver and Biliary system:  There is a subcentimeter hypodensity right lobe of liver that is too small fully characterize. Mild diffuse decreased attenuation of liver is compatible fatty liver infiltration. Adrenal glands:  Normal. Kidneys and ureters: Normal. Spleen:  Normal. Pancreas:  Normal. Gallbladder:  Surgically absent. Lymph nodes, Peritoneum and mesentery:  There is no mesenteric or retroperitoneal lymphadenopathy. Gastrointestinal tract:  There are no dilated loops of bowel or free intraperitoneal air.    The appendix is normal. There is mild descending colonic and sigmoid colonic diverticulosis without evidence of diverticulitis. There is some mild thickening of the transverse colonic wall which may relate to a mild colitis. Aorta/IVC:   There is mild plaque seen throughout the abdominal aorta without evidence of aneurysmal dilation or dissection.  IVC normal. Abdominal wall:  Normal. PELVIS: Fluid: There is no free fluid in the pelvis. Lymph Nodes:  There is no pelvic or inguinal lymphadenopathy.. Urinary bladder:  Normal. BONES:  There are no osseous destructive lesions.. ADDITIONAL  SIGNIFICANT FINDINGS:  Prior hysterectomy.     1. Mild thickening of the transverse colonic wall may relate to a mild colitis. 2. Mild diffuse hepatic steatosis. 3. Diverticulosis without evidence of diverticulitis. Electronically signed by:  Shravan Palacio D.O.  3/20/2023 1:47 AM Mountain Time      COVID19   Date Value Ref Range Status   03/20/2023 Not Detected Not Detected - Ref. Range Final     ASSESSMENTS/PLANS  1.  Acute viral gastroenteritis, norovirus positive  2.  Abdominal pain and cramping, related to above  3.  Acute diarrhea, related to above  4.  Non-intractable nausea vomiting, related to above  5.  GERD.    Mishel Freeman is a 47 y.o. female present  hospital cute onset of abdominal pain, nausea, vomiting, and diarrhea.  GI PCR panel significant for norovirus.  Treatment of norovirus is supportive in nature with adequate hydration and management of symptoms    >>> Management of norovirus is supportive in nature  >>> Recommend small amounts of clear fluids frequently including soups, juices, water, and electrolyte beverages; may advance diet as tolerated.  >>> Discussed with patient that she can expect a several week period of irritable bowel syndrome following acute viral illness; this is due to transient lactose intolerance secondary to damage to brush border of colon.  >>> Given significance recurrent nature of her viral illness, may use glutamine 5 g 3 times daily x8-week to improve symptoms    We will sign off.  Please call questions.  Okay for discharge in a.m. following IV fluid hydration.    I discussed the patient's findings and my recommendations with patient, family and nursing staff    Herson Schulte, PATY  03/20/23  17:34 EDT

## 2023-03-20 NOTE — CONSULTS
INFECTIOUS DISEASE CONSULT/INITIAL HOSPITAL VISIT    Mishel Freeman  1975  [id number]    Date of Consult: 3/20/2023    Admission Date: 3/20/2023      Requesting Provider: No ref. provider found  Evaluating Physician: Atul Gamble MD    Reason for Consultation: Gastroenteritis with a history of C. difficile colitis    History of present illness:    Patient is a 47 y.o. female who is seen today for evaluation of nausea/vomiting, abdominal pain, and diarrhea with a history of prior C. difficile colitis and norovirus in 12/19.  She became severely ill with refractory nausea and vomiting yesterday.  She also reports some upper abdominal pain and then subsequently developed diarrhea.  She reported contact with an ill child approximately 3 days ago.  She presented to the emergency room where she was noted to have leukocytosis with a white blood cell count of 15.5, lactic acidosis with a lactic acid level of 3.9, and procalcitonin of 0.43, and a C-reactive protein of 1.43.  An abdominal/pelvic CT scan revealed a diffuse colitis which was most prominent in the transverse colon.  Her nausea and vomiting have improved somewhat today but she still has diarrhea.  She has been afebrile.  She reports no antibiotic therapy prior to admission for at least 4-5 months.    Past Medical History:   Diagnosis Date   • Anxiety    • Depression    • Diverticulitis    • Eczema    • GERD (gastroesophageal reflux disease)    • Headache    • Hypertension    • Lung nodule    • Tobacco abuse        Past Surgical History:   Procedure Laterality Date   • BRONCHOSCOPY N/A 7/17/2019    Procedure: DARIN, RADIAL EBUS WITH FLUORO BRONCH;  Surgeon: Nikhil Knutson MD;  Location: [facility] ENDOSCOPY;  Service: Pulmonary   • CHOLECYSTECTOMY  1991   • COLONOSCOPY  2016, 2017   • ENDOMETRIAL ABLATION W/ NOVASURE  2010    WITH LAP BTL   • VAGINAL HYSTERECTOMY  2010    Partial        Family History   Problem Relation Age of Onset   •  Breast cancer Paternal Aunt         40's   • Cancer Mother    • Diabetes Father    • Diabetes Brother    • Cancer Maternal Aunt    • Ovarian cancer Neg Hx        Social History     Socioeconomic History   • Marital status:    • Number of children: 1   Tobacco Use   • Smoking status: Every Day     Packs/day: 0.25     Years: 30.00     Pack years: 7.50     Types: Cigarettes   • Smokeless tobacco: Never   • Tobacco comments:     5 a day    Substance and Sexual Activity   • Alcohol use: Yes     Alcohol/week: 1.0 standard drink     Types: 1 Cans of beer per week     Comment: socially   • Drug use: No   • Sexual activity: Defer       Allergies   Allergen Reactions   • Macrobid [Nitrofurantoin Macrocrystal] Hives and Shortness Of Breath         Medication:    Current Facility-Administered Medications:   •  lactated ringers infusion, 150 mL/hr, Intravenous, Continuous, Jacob Bradford, DO  •  Morphine sulfate (PF) injection 3 mg, 3 mg, Intravenous, Q3H PRN, Jacob Bradford, DO  •  naloxone (NARCAN) injection 0.4 mg, 0.4 mg, Intravenous, PRN, Jacob Bradford, DO  •  ondansetron (ZOFRAN) injection 4 mg, 4 mg, Intravenous, Q6H PRN, Jacob Bradford, DO  •  Pharmacy Consult, , Does not apply, Continuous PRN, Jacob Bradford, DO  •  sodium chloride 0.9 % flush 10 mL, 10 mL, Intravenous, PRN, Emergency, Triage Protocol, MD  •  vancomycin (VANCOCIN) capsule 125 mg, 125 mg, Oral, Q6H, Jacob Bradford, DO, 125 mg at 03/20/23 0631    Current Outpatient Medications:   •  acetaminophen (TYLENOL) 500 MG tablet, Take 2 tablets by mouth Every 6 (Six) Hours As Needed for Mild Pain  or Moderate Pain ., Disp: 30 tablet, Rfl: 0  •  benzonatate (TESSALON) 100 MG capsule, Take 1 capsule by mouth 3 (Three) Times a Day As Needed for Cough., Disp: 20 capsule, Rfl: 0  •  brompheniramine-pseudoephedrine-DM 30-2-10 MG/5ML syrup, Take 5 mL by mouth 4 (Four) Times a Day As Needed for Allergies., Disp: 100 mL, Rfl: 0  •  desvenlafaxine (PRISTIQ) 50 MG 24  hr tablet, Take 1 tablet by mouth Daily. Pt taking 100 mg daily, Disp: , Rfl:   •  vilazodone (VIIBRYD) 10 MG tablet tablet, Take 10 mg by mouth Daily., Disp: , Rfl:     Antibiotics:  Anti-Infectives (From admission, onward)    Ordered     Dose/Rate Route Frequency Start Stop    23 0459  vancomycin (VANCOCIN) capsule 125 mg        Ordering Provider: Jacob Bradford DO    125 mg Oral Every 6 Hours Scheduled 23 0600 23 0559    23 0453  piperacillin-tazobactam (ZOSYN) 3.375 g in iso-osmotic dextrose 50 ml (premix)        Ordering Provider: Feroz Hamlin PA    3.375 g Intravenous Once 23 0455 23 0644            Review of Systems:  Constitutional--malaise and fatigue.  She denies fevers and drenching sweats.  HEENT-- No new vision, hearing or throat complaints.  No epistaxis or oral sores.  Denies odynophagia or dysphagia. No headache, photophobia or neck stiffness.  CV-- No chest pain, palpitation or syncope  Resp-- No SOB/cough  GI-nausea, vomiting, upper abdominal pain, and diarrhea.  Her vomiting has been especially severe.  She has a history of C. difficile colitis and norovirus in 2019.  -- No dysuria, hematuria, or flank pain.    Heme- No active bruising or bleeding  MS-- no swelling or pain in the bones or joints of arms/legs.  No new back pain.  Neuro-- No acute focal weakness or numbness in the arms or legs.    Skin--No rashes or lesions      Physical Exam:   Vital Signs  Temp (24hrs), Av.6 °F (36.4 °C), Min:97.6 °F (36.4 °C), Max:97.6 °F (36.4 °C)    Temp  Min: 97.6 °F (36.4 °C)  Max: 97.6 °F (36.4 °C)  BP  Min: 97/66  Max: 167/84  Pulse  Min: 98  Max: 120  Resp  Min: 16  Max: 22  SpO2  Min: 93 %  Max: 100 %    GENERAL: Awake and alert, in no acute distress.   HEENT: Normocephalic, atraumatic.  PERRL. EOMI. No conjunctival injection. No icterus. Oropharynx clear without evidence of thrush or exudate.   NECK: Supple without nuchal rigidity. No mass.  LYMPH: No  cervical, axillary or inguinal lymphadenopathy.  HEART: RRR; No murmur, rubs, gallops.   LUNGS: Clear to auscultation bilaterally without wheezing, rales, rhonchi. Normal respiratory effort. Nonlabored. No dullness.  ABDOMEN: Soft, nontender, nondistended.  No rebound or guarding. NO mass or HSM.  EXT:  No cyanosis, clubbing or edema. No cord.  :  Without Roberts catheter.  MSK: No joint effusions or erythema  SKIN: Warm and dry without cutaneous eruptions on Inspection/palpation.    NEURO: Oriented to PPT.  Motor 5/5 strength  PSYCHIATRIC: Normal insight and judgment. Cooperative with PE    Laboratory Data    Results from last 7 days   Lab Units 03/20/23  0032   WBC 10*3/mm3 15.54*   HEMOGLOBIN g/dL 15.0   HEMATOCRIT % 44.2   PLATELETS 10*3/mm3 262     Results from last 7 days   Lab Units 03/20/23  0236   SODIUM mmol/L 138   POTASSIUM mmol/L 3.8   CHLORIDE mmol/L 101   CO2 mmol/L 18.0*   BUN mg/dL 16   CREATININE mg/dL 0.81   GLUCOSE mg/dL 153*   CALCIUM mg/dL 9.8     Results from last 7 days   Lab Units 03/20/23  0236   ALK PHOS U/L 62   BILIRUBIN mg/dL 0.8   ALT (SGPT) U/L 25   AST (SGOT) U/L 20     Results from last 7 days   Lab Units 03/20/23  0032   SED RATE mm/hr 20     Results from last 7 days   Lab Units 03/20/23  0236   CRP mg/dL 1.43*     Results from last 7 days   Lab Units 03/20/23  0236   LACTATE mmol/L 3.9*             Estimated Creatinine Clearance: 83.5 mL/min (by C-G formula based on SCr of 0.81 mg/dL).      Microbiology:  No results found for: ACANTHNAEG, AFBCX, BPERTUSSISCX, BLOODCX  No results found for: BCIDPCR, CXREFLEX, CSFCX, CULTURETIS  No results found for: CULTURES, HSVCX, URCX  No results found for: EYECULTURE, GCCX, HSVCULTURE, LABHSV  No results found for: LEGIONELLA, MRSACX, MUMPSCX, MYCOPLASCX  No results found for: NOCARDIACX, STOOLCX  No results found for: THROATCX, UNSTIMCULT, URINECX, CULTURE, VZVCULTUR  No results found for: VIRALCULTU, WOUNDCX        Radiology:  Imaging Results  (Last 72 Hours)     Procedure Component Value Units Date/Time    CT Abdomen Pelvis With Contrast [[id number]] Collected: 03/20/23 0144     Updated: 03/20/23 0349    Narrative:      EXAMINATION: CT ABDOMEN AND PELVIS WITH IV CONTRAST       DATE OF EXAMINATION: 8/20/2023.    COMPARISON: 12/5/2022..    INDICATION: Abdominal pain with nausea and vomiting.    PROCEDURE:  Axial CT of the abdomen and pelvis was performed with contrast and sagittal and coronal reformatted images were performed.  100 mm of Isovue-300 was given intravenously. CT dose lowering techniques were used, to include: automated exposure   control, adjustment for patient size, and/or use of iterative reconstruction.    FINDINGS:    LOWER CHEST :  The visualized lung bases are clear.  There are no pleural or pericardial effusions.    ABDOMEN:    Liver and Biliary system:  There is a subcentimeter hypodensity right lobe of liver that is too small fully characterize. Mild diffuse decreased attenuation of liver is compatible fatty liver infiltration.    Adrenal glands:  Normal.    Kidneys and ureters: Normal.    Spleen:  Normal.    Pancreas:  Normal.    Gallbladder:  Surgically absent.    Lymph nodes, Peritoneum and mesentery:  There is no mesenteric or retroperitoneal lymphadenopathy.    Gastrointestinal tract:  There are no dilated loops of bowel or free intraperitoneal air.    The appendix is normal. There is mild descending colonic and sigmoid colonic diverticulosis without evidence of diverticulitis. There is some mild thickening of   the transverse colonic wall which may relate to a mild colitis.    Aorta/IVC:   There is mild plaque seen throughout the abdominal aorta without evidence of aneurysmal dilation or dissection.  IVC normal.    Abdominal wall:  Normal.    PELVIS:    Fluid: There is no free fluid in the pelvis.    Lymph Nodes:  There is no pelvic or inguinal lymphadenopathy..    Urinary bladder:  Normal.    BONES:  There are no osseous  destructive lesions..    ADDITIONAL  SIGNIFICANT FINDINGS:  Prior hysterectomy.      Impression:          1. Mild thickening of the transverse colonic wall may relate to a mild colitis.  2. Mild diffuse hepatic steatosis.  3. Diverticulosis without evidence of diverticulitis.          Electronically signed by:  Shravan Palacio D.O.    3/20/2023 1:47 AM Mountain Time      I read her radiographic images with Dr. Guerrero in radiology.      Impression:   1.  Norovirus gastroenteritis/colitis- her presentation even prior to the GI PCR being positive was consistent with norovirus gastroenteritis rather than recurrent C. difficile colitis.  She did not have any recent antibiotic therapy to suggest a risk for recurrent C. difficile infection.  I saw her earlier this morning and since I saw her her GI PCR returned positive for norovirus and her stool C. difficile PCR was negative.  She received Zosyn in the emergency room x1 and was then started on oral vancomycin.  We can discontinue vancomycin and leave her off of antibacterial antibiotic therapy.  2.  History of C. difficile infection-with no evidence of relapse  3.  Lactic acidosis-secondary to volume depletion  4.  Leukocytosis/neutrophilia-secondary to gastroenteritis    PLAN/RECOMMENDATIONS:   Thank you for asking us to see Mishel Freeman, I recommend the followin.  GI PCR and stool C. difficile PCR-done  2.  Discontinue antibacterial antibiotic therapy  3.  Contact precautions for norovirus  4.  Discontinue oral vancomycin  5.  IV fluids  6.  Antiemetics       Atul Gamble MD  3/20/2023  07:34 EDT

## 2023-03-20 NOTE — PROGRESS NOTES
[facility] Medicine Services  ADMISSION FOLLOW-UP NOTE          Patient admitted after midnight, H&P by my partner performed earlier on today's date reviewed.  Interim findings, labs, and charting also reviewed.        The [facility] Hospital Problem List has been managed and updated to include any new diagnoses:  Active Hospital Problems    Diagnosis  POA   • **Colitis presumed infectious [K52.9]  Yes   • Hyperglycemia [R73.9]  Yes   • GERD  [K21.9]  Yes   • H/O Clostridium difficile/norovirus infection (2019)  [Z86.19]  Yes   • HTN (hypertension) [I10]  Yes      Resolved Hospital Problems   No resolved problems to display.         ADDITIONAL PLAN:  - detailed assessment and plan from admission reviewed    Mishel Freeman is a 47 y.o. female with past medical history of C. difficile colitis, GERD, hypertension, chronic tobacco use who presents to the ER with complaints of nausea, vomiting, and abdominal pain.  Patient reports that her pain started late yesterday evening.  She started having nausea and vomiting.    Sepsis - POA  Transverse Colitis - Norovirus  Hx of C. Diff  Lactic Acidosis  -CT abd/pelvis with mild thickening of the transverse colonic wall may relate to a mild colitis.  -C. Diff toxin is negative, started on empiric treatment overnight, will discontinue  -aggressive IV fluids, lactate trending up, repeat improving  -PRN anti-emetics and PRN pain medications, continue supportive care  -GI consult   -GI PCR positive for norovirus     HTN  - currently normotensive     GERD  -PPI     Hyperglycemia  -No prior dx of T2DM  -A1C 5.7      Expected Discharge TBD       Cydney Lechuga, DO  03/20/23

## 2023-03-20 NOTE — H&P
[facility] Medicine Services  HISTORY AND PHYSICAL    Patient Name: Mishel Freeman  : 1975  MRN: [id number]  Primary Care Physician: Anastacia Jose MD  Date of admission: 3/20/2023    Subjective   Subjective     Chief Complaint:  Abdominal pain, N/V    HPI:  Mishel Freeman is a 47 y.o. female with a history of c. Diff colitis, HTN, GERD, and tobacco use who presents to [facility] ED for complaint of abdominal pain, and persistent N/V. She states this began late yesterday evening. She states it began as generalized abdominal pain, but mostly localized to the epigastric region, as well as nausea and multiple episodes of vomiting and diarrhea. She denies recent antibiotic use, but does report she was around a small child whom was also having nausea and vomiting 3 days ago. She denies fever, chills, chest pain, SOB, or cough. She is a current smoker (smokes about 1/4 pack a day).         Review of Systems   Constitutional: Positive for chills and fatigue. Negative for fever and unexpected weight change.   HENT: Negative for nosebleeds, sore throat and trouble swallowing.    Eyes: Negative for photophobia and visual disturbance.   Respiratory: Negative for cough, shortness of breath and wheezing.    Cardiovascular: Negative for chest pain and palpitations.   Gastrointestinal: Positive for abdominal pain (generalized), diarrhea, nausea and vomiting.   Genitourinary: Negative for dysuria and hematuria.   Musculoskeletal: Negative for arthralgias and myalgias.   Skin: Negative.    Neurological: Negative for tremors, syncope, speech difficulty and weakness.   Psychiatric/Behavioral: Negative for confusion. The patient is not nervous/anxious.           Personal History     Past Medical History:   Diagnosis Date   • Anxiety    • Depression    • Diverticulitis    • Eczema    • GERD (gastroesophageal reflux disease)    • Headache    • Hypertension    • Lung nodule    •  Tobacco abuse              Past Surgical History:   Procedure Laterality Date   • BRONCHOSCOPY N/A 7/17/2019    Procedure: DARIN, RADIAL EBUS WITH FLUORO BRONCH;  Surgeon: Nikhil Knutson MD;  Location: [facility] ENDOSCOPY;  Service: Pulmonary   • CHOLECYSTECTOMY  1991   • COLONOSCOPY  2016, 2017   • ENDOMETRIAL ABLATION W/ NOVASURE  2010    WITH LAP BTL   • VAGINAL HYSTERECTOMY  2010    Partial        Family History:  family history includes Breast cancer in her paternal aunt; Cancer in her maternal aunt and mother; Diabetes in her brother and father.     Social History:  reports that she has been smoking cigarettes. She has a 7.50 pack-year smoking history. She has never used smokeless tobacco. She reports current alcohol use of about 1.0 standard drink per week. She reports that she does not use drugs.  Social History     Social History Narrative    Lives in [place].        Medications:  acetaminophen, benzonatate, brompheniramine-pseudoephedrine-DM, desvenlafaxine, and vilazodone    Allergies   Allergen Reactions   • Macrobid [Nitrofurantoin Macrocrystal] Hives and Shortness Of Breath       Objective   Objective     Vital Signs:   Temp:  [97.6 °F (36.4 °C)] 97.6 °F (36.4 °C)  Heart Rate:  [101-120] 101  Resp:  [16-22] 16  BP: ()/(66-84) 116/69    Physical Exam  Constitutional:       General: She is not in acute distress.     Appearance: Normal appearance.   HENT:      Head: Atraumatic.      Right Ear: External ear normal.      Left Ear: External ear normal.      Nose: Nose normal.   Eyes:      Extraocular Movements: Extraocular movements intact.      Conjunctiva/sclera: Conjunctivae normal.      Pupils: Pupils are equal, round, and reactive to light.   Cardiovascular:      Rate and Rhythm: Regular rhythm. Tachycardia present.      Pulses: Normal pulses.      Heart sounds: Normal heart sounds. No murmur heard.  Pulmonary:      Effort: Pulmonary effort is normal. No respiratory distress.      Breath  sounds: Normal breath sounds. No wheezing, rhonchi or rales.   Abdominal:      General: Bowel sounds are normal. There is no distension.      Tenderness: There is abdominal tenderness (mild tenderness across abdomen.). There is no guarding or rebound.   Musculoskeletal:         General: Normal range of motion.      Cervical back: No rigidity.      Right lower leg: No edema.      Left lower leg: No edema.   Skin:     General: Skin is warm and dry.      Coloration: Skin is not jaundiced.      Findings: No lesion or rash.   Neurological:      General: No focal deficit present.      Mental Status: She is alert and oriented to person, place, and time.   Psychiatric:         Attention and Perception: Attention normal.         Mood and Affect: Mood normal.         Behavior: Behavior normal.         Thought Content: Thought content normal.          Result Review:  I have personally reviewed the results from the time of this admission to 3/20/2023 05:59 EDT and agree with these findings:  [x]  Laboratory list / accordion  []  Microbiology  [x]  Radiology  [x]  EKG/Telemetry   []  Cardiology/Vascular   []  Pathology  [x]  Old records  []  Other:    LAB RESULTS:      Lab 03/20/23  0236 03/20/23  0032   WBC  --  15.54*   HEMOGLOBIN  --  15.0   HEMATOCRIT  --  44.2   PLATELETS  --  262   NEUTROS ABS  --  13.20*   IMMATURE GRANS (ABS)  --  0.07*   LYMPHS ABS  --  1.30   MONOS ABS  --  0.79   EOS ABS  --  0.15   MCV  --  89.5   SED RATE  --  20   CRP 1.43*  --    PROCALCITONIN 0.43*  --    LACTATE 3.9* 5.8*         Lab 03/20/23  0236   SODIUM 138   POTASSIUM 3.8   CHLORIDE 101   CO2 18.0*   ANION GAP 19.0*   BUN 16   CREATININE 0.81   EGFR 89.7   GLUCOSE 153*   CALCIUM 9.8         Lab 03/20/23  0236   TOTAL PROTEIN 7.5   ALBUMIN 4.5   GLOBULIN 3.0   ALT (SGPT) 25   AST (SGOT) 20   BILIRUBIN 0.8   ALK PHOS 62   LIPASE 20         Lab 03/20/23  0236   HSTROP T 8                 Brief Urine Lab Results  (Last result in the past 365  days)      Color   Clarity   Blood   Leuk Est   Nitrite   Protein   CREAT   Urine HCG        03/20/23 0409 Yellow   Clear   Small (1+)   Negative   Negative   Negative               Microbiology Results (last 10 days)     ** No results found for the last 240 hours. **          CT Abdomen Pelvis With Contrast    Result Date: 3/20/2023  EXAMINATION: CT ABDOMEN AND PELVIS WITH IV CONTRAST   DATE OF EXAMINATION: 8/20/2023. COMPARISON: 12/5/2022.. INDICATION: Abdominal pain with nausea and vomiting. PROCEDURE:  Axial CT of the abdomen and pelvis was performed with contrast and sagittal and coronal reformatted images were performed.  100 mm of Isovue-300 was given intravenously. CT dose lowering techniques were used, to include: automated exposure control, adjustment for patient size, and/or use of iterative reconstruction. FINDINGS: LOWER CHEST :  The visualized lung bases are clear.  There are no pleural or pericardial effusions. ABDOMEN: Liver and Biliary system:  There is a subcentimeter hypodensity right lobe of liver that is too small fully characterize. Mild diffuse decreased attenuation of liver is compatible fatty liver infiltration. Adrenal glands:  Normal. Kidneys and ureters: Normal. Spleen:  Normal. Pancreas:  Normal. Gallbladder:  Surgically absent. Lymph nodes, Peritoneum and mesentery:  There is no mesenteric or retroperitoneal lymphadenopathy. Gastrointestinal tract:  There are no dilated loops of bowel or free intraperitoneal air.    The appendix is normal. There is mild descending colonic and sigmoid colonic diverticulosis without evidence of diverticulitis. There is some mild thickening of the transverse colonic wall which may relate to a mild colitis. Aorta/IVC:   There is mild plaque seen throughout the abdominal aorta without evidence of aneurysmal dilation or dissection.  IVC normal. Abdominal wall:  Normal. PELVIS: Fluid: There is no free fluid in the pelvis. Lymph Nodes:  There is no pelvic or  inguinal lymphadenopathy.. Urinary bladder:  Normal. BONES:  There are no osseous destructive lesions.. ADDITIONAL  SIGNIFICANT FINDINGS:  Prior hysterectomy.     Impression: 1. Mild thickening of the transverse colonic wall may relate to a mild colitis. 2. Mild diffuse hepatic steatosis. 3. Diverticulosis without evidence of diverticulitis. Electronically signed by:  Shraavn Palacio D.O.  3/20/2023 1:47 AM Mountain Time      Results for orders placed during the hospital encounter of 10/18/22    Adult Transthoracic Echo Complete W/ Cont if Necessary Per Protocol    Interpretation Summary  •  Left ventricular ejection fraction appears to be 56 - 60%. Left ventricular systolic function is normal.  •  Left ventricular wall thickness is consistent with mild concentric hypertrophy.      Assessment & Plan   Assessment & Plan       Colitis presumed infectious    HTN (hypertension)    H/O Clostridium difficile/norovirus infection (2019)     GERD     Hyperglycemia      Mishel Freeman is a 47 y.o. female with a history of c. Diff colitis, HTN, GERD, and tobacco use who presents to [facility] ED for complaint of abdominal pain, and persistent N/V.    Sepsis  Colitis  Hx of C. Diff  -Sepsis: source of infection, leukocytosis, tachycardia, elevated lactate, elevated procal   -CT abd/pelvis tonight shows mild thickening of the transverse colonic wall may relate to a mild colitis.  -Given her hx of C. Diff colitis, will go ahead and begin treatment for c. Diff  -C. Diff toxin pending.  -IV fluids  -Zofran for nausea.   -GI consult for the am.  -ID consult for the am  -received one dose zosyn in ER, hold further abx until ID can review.     HTN  -She reports taking Lisinopril, though this is not on her med list. Will need nurse to reconcile home med list. Contact hospital medicine provider once completed.     GERD  -PPI    Hyperglycemia  -Blood glucose 153  -No prior dx of T2DM  -Check A1C       DVT prophylaxis:   SCDS    CODE STATUS:  Full Code  Code Status (Patient has no pulse and is not breathing): CPR (Attempt to Resuscitate)  Medical Interventions (Patient has pulse or is breathing): Full Support      Expected Discharge  1-2 days      This note has been completed as part of a split-shared workflow.     Signature: Electronically signed by Tom Burnham PA-C, 03/20/23, 5:58 AM EDT      Total APC time spent: 60 minutes      Attending   Admission Attestation       I have performed an independent face-to-face diagnostic evaluation including performing an independent physical examination as documented here.  The documented plan of care above was reviewed and developed with the advanced practice clinician (APC).      Brief Summary Statement:   Mishel Freeman is a 47 y.o. female with past medical history of C. difficile colitis, GERD, hypertension, chronic tobacco use who presents to the ER with complaints of nausea, vomiting, and abdominal pain.  Patient reports that her pain started late yesterday evening.  She started having nausea and vomiting.  Reports that her vomiting was happening about every 5 minutes over a long stretch of time.  She also had some soft stool.  She reports that she had positive sick contact with a child that had presumed gastroenteritis with nausea vomiting approximately 3 days ago.  She denies any fever or chills.  Denies any chest pain or pressure.  Denies any other symptoms at this time.  Pain is improved with pain medication.  Work-up is concerning for transverse colon colitis.  Given her history, will get opinion from infectious disease on antibiotic regimen.  Empirically start oral vancomycin.  Patient has Zosyn ordered x1 in the ER    Remainder of detailed HPI is as noted by APC and has been reviewed and/or edited by me for completeness.    Attending Physical Exam:  Temp:  [97.6 °F (36.4 °C)] 97.6 °F (36.4 °C)  Heart Rate:  [101-120] 101  Resp:  [16-22] 16  BP:  ()/(66-84) 116/69    .Constitutional: Awake, alert, appears acutely ill  Eyes: PERRLA, sclerae anicteric, no conjunctival injection  HENT: NCAT, mucous membranes moist  Neck: Supple, no thyromegaly, no lymphadenopathy, trachea midline  Respiratory: Clear to auscultation bilaterally, nonlabored respirations   Cardiovascular: tachy, regular, no murmurs, rubs, or gallops, palpable pedal pulses bilaterally  Gastrointestinal: Positive bowel sounds, soft, + tenderness without guarding or rebound, nondistended  Musculoskeletal: No bilateral ankle edema, no clubbing or cyanosis to extremities  Psychiatric: Appropriate affect, cooperative  Neurologic: Oriented x 3, strength symmetric in all extremities, Cranial Nerves grossly intact to confrontation, speech clear  Skin: No rashes      Brief Assessment/Plan :  See detailed assessment and plan developed with APC which I have reviewed and/or edited for completeness.            Jacob Bradford, DO  03/20/23

## 2023-03-20 NOTE — CASE MANAGEMENT/SOCIAL WORK
Discharge Planning Assessment  [facility]     Patient Name: Mishel Freeman  MRN: [id number]  Today's Date: 3/20/2023    Admit Date: 3/20/2023    Plan: Home with family   Discharge Needs Assessment     Row Name 03/20/23 1142       Living Environment    People in Home spouse    Name(s) of People in Home David Freeman (spouse) [phone]    Current Living Arrangements home    Potentially Unsafe Housing Conditions none    Primary Care Provided by self    Provides Primary Care For no one    Family Caregiver if Needed spouse    Able to Return to Prior Arrangements yes       Resource/Environmental Concerns    Resource/Environmental Concerns none    Transportation Concerns none       Transition Planning    Patient/Family Anticipates Transition to home with family    Patient/Family Anticipated Services at Transition none    Transportation Anticipated family or friend will provide       Discharge Needs Assessment    Readmission Within the Last 30 Days no previous admission in last 30 days    Equipment Currently Used at Home none    Concerns to be Addressed denies needs/concerns at this time    Anticipated Changes Related to Illness none    Equipment Needed After Discharge none               Discharge Plan     Row Name 03/20/23 9185       Plan    Plan Home with family    Patient/Family in Agreement with Plan yes    Plan Comments Spoke with patient by phone. Lives with David Freeman (spouse) [phone] in [place]. Is independent with ADL's. No problems with [place] [employer] or medications. Uses no DME at home. No advanced directives. PCP is Anastacia Jose MD. Plam is home with family. Family will transport. CM will continue to follow.    Final Discharge Disposition Code 01 - home or self-care              Continued Care and Services - Admitted Since 3/20/2023    Coordination has not been started for this encounter.          Demographic Summary     Row Name 03/20/23 1141       General Information    Admission Type  inpatient    Arrived From emergency department    Referral Source admission list    Reason for Consult discharge planning    Preferred Language English       Contact Information    Permission Granted to Share Info With     Contact Information Obtained for                Functional Status     Row Name 03/20/23 1141       Functional Status    Usual Activity Tolerance good    Current Activity Tolerance good       Functional Status, IADL    Medications independent    Meal Preparation independent    Housekeeping independent    Laundry independent    Shopping independent       Mental Status    General Appearance WDL WDL       Mental Status Summary    Recent Changes in Mental Status/Cognitive Functioning no changes       Employment/    Employment Status employed full-time               Psychosocial    No documentation.                Abuse/Neglect    No documentation.                Legal    No documentation.                Substance Abuse    No documentation.                Patient Forms    No documentation.                   Sina Deng RN

## 2023-03-20 NOTE — ED PROVIDER NOTES
EMERGENCY DEPARTMENT ENCOUNTER    Pt Name: Mishel Freeman  MRN: [id number]  Pt :   1975  Room Number:    Date of encounter:  3/20/2023  PCP: Anastacia Jose MD  ED Provider: ALEXEI Merlos    Historian: Patient    HPI:  Chief Complaint: Vomiting    Context: Mishel Freeman is a 47 y.o. female who presents to the ED c/o epigastric abdominal pain, nausea and vomiting.  Patient reports that she started to experience vomiting at approximately 8 PM this evening.  She reports that since this time she was vomiting approximately every 5 minutes.  Patient reports that she has been around a child that had an episode of gastroenteritis recently.  Patient denies any fever.  She reports no urinary complaints.  She denies any previous abdominal surgeries.  She reports that she has had some diarrhea today.  Patient with history of C. difficile in 2019.  No recent antibiotic use.  Also history of diverticulitis.  HPI     REVIEW OF SYSTEMS  A chief complaint appropriate review of systems was completed and is negative except as noted in the HPI.     PAST MEDICAL HISTORY  Past Medical History:   Diagnosis Date   • Anxiety    • Depression    • Diverticulitis    • Eczema    • GERD (gastroesophageal reflux disease)    • Headache    • Hypertension    • Lung nodule    • Tobacco abuse        PAST SURGICAL HISTORY  Past Surgical History:   Procedure Laterality Date   • BRONCHOSCOPY N/A 2019    Procedure: DARIN, RADIAL EBUS WITH FLUORO BRONCH;  Surgeon: Nikhil Knutson MD;  Location: [facility] ENDOSCOPY;  Service: Pulmonary   • CHOLECYSTECTOMY     • COLONOSCOPY  ,    • ENDOMETRIAL ABLATION W/ NOVASURE      WITH LAP BTL   • VAGINAL HYSTERECTOMY      Partial        FAMILY HISTORY  Family History   Problem Relation Age of Onset   • Breast cancer Paternal Aunt         40's   • Cancer Mother    • Diabetes Father    • Diabetes Brother    • Cancer Maternal Aunt    • Ovarian cancer Neg  Hx        SOCIAL HISTORY  Social History     Socioeconomic History   • Marital status:    • Number of children: 1   Tobacco Use   • Smoking status: Every Day     Packs/day: 0.25     Years: 30.00     Pack years: 7.50     Types: Cigarettes   • Smokeless tobacco: Never   • Tobacco comments:     5 a day    Substance and Sexual Activity   • Alcohol use: Yes     Alcohol/week: 1.0 standard drink     Types: 1 Cans of beer per week     Comment: socially   • Drug use: No   • Sexual activity: Defer       ALLERGIES  Macrobid [nitrofurantoin macrocrystal]    PHYSICAL EXAM  Physical Exam  GENERAL:   Ill appearing with nausea and vomiting on initial interview and exam  HENT: Nares patent.  EYES: No scleral icterus.  CV: Regular rhythm, tachycardic  RESPIRATORY: Normal effort.  No audible wheezes, rales or rhonchi.  ABDOMEN: Soft, nondistended  MUSCULOSKELETAL: No deformities.   NEURO: Alert, moves all extremities, follows commands.  SKIN: Warm, dry, no rash visualized.  PSYCH: Anxious  I have reviewed the triage vital signs and nursing notes.    Physical Exam     LAB RESULTS  Results for orders placed or performed during the hospital encounter of 03/20/23   Comprehensive Metabolic Panel    Specimen: Blood   Result Value Ref Range    Glucose 153 (H) 65 - 99 mg/dL    BUN 16 6 - 20 mg/dL    Creatinine 0.81 0.57 - 1.00 mg/dL    Sodium 138 136 - 145 mmol/L    Potassium 3.8 3.5 - 5.2 mmol/L    Chloride 101 98 - 107 mmol/L    CO2 18.0 (L) 22.0 - 29.0 mmol/L    Calcium 9.8 8.6 - 10.5 mg/dL    Total Protein 7.5 6.0 - 8.5 g/dL    Albumin 4.5 3.5 - 5.2 g/dL    ALT (SGPT) 25 1 - 33 U/L    AST (SGOT) 20 1 - 32 U/L    Alkaline Phosphatase 62 39 - 117 U/L    Total Bilirubin 0.8 0.0 - 1.2 mg/dL    Globulin 3.0 gm/dL    A/G Ratio 1.5 g/dL    BUN/Creatinine Ratio 19.8 7.0 - 25.0    Anion Gap 19.0 (H) 5.0 - 15.0 mmol/L    eGFR 89.7 >60.0 mL/min/1.73   Lipase    Specimen: Blood   Result Value Ref Range    Lipase 20 13 - 60 U/L   Single High  Sensitivity Troponin T    Specimen: Blood   Result Value Ref Range    HS Troponin T 8 <10 ng/L   CBC Auto Differential    Specimen: Blood   Result Value Ref Range    WBC 15.54 (H) 3.40 - 10.80 10*3/mm3    RBC 4.94 3.77 - 5.28 10*6/mm3    Hemoglobin 15.0 12.0 - 15.9 g/dL    Hematocrit 44.2 34.0 - 46.6 %    MCV 89.5 79.0 - 97.0 fL    MCH 30.4 26.6 - 33.0 pg    MCHC 33.9 31.5 - 35.7 g/dL    RDW 13.2 12.3 - 15.4 %    RDW-SD 43.0 37.0 - 54.0 fl    MPV 9.4 6.0 - 12.0 fL    Platelets 262 140 - 450 10*3/mm3    Neutrophil % 84.8 (H) 42.7 - 76.0 %    Lymphocyte % 8.4 (L) 19.6 - 45.3 %    Monocyte % 5.1 5.0 - 12.0 %    Eosinophil % 1.0 0.3 - 6.2 %    Basophil % 0.2 0.0 - 1.5 %    Immature Grans % 0.5 0.0 - 0.5 %    Neutrophils, Absolute 13.20 (H) 1.70 - 7.00 10*3/mm3    Lymphocytes, Absolute 1.30 0.70 - 3.10 10*3/mm3    Monocytes, Absolute 0.79 0.10 - 0.90 10*3/mm3    Eosinophils, Absolute 0.15 0.00 - 0.40 10*3/mm3    Basophils, Absolute 0.03 0.00 - 0.20 10*3/mm3    Immature Grans, Absolute 0.07 (H) 0.00 - 0.05 10*3/mm3    nRBC 0.0 0.0 - 0.2 /100 WBC   Scan Slide    Specimen: Blood   Result Value Ref Range    RBC Morphology Normal Normal    WBC Morphology Normal Normal    Clumped Platelets Present None Seen   Lactic Acid, Plasma    Specimen: Blood   Result Value Ref Range    Lactate 5.8 (C) 0.5 - 2.0 mmol/L   Urine Drug Screen - Urine, Clean Catch    Specimen: Urine, Clean Catch   Result Value Ref Range    THC, Screen, Urine Negative Negative    Phencyclidine (PCP), Urine Negative Negative    Cocaine Screen, Urine Negative Negative    Methamphetamine, Ur Negative Negative    Opiate Screen Negative Negative    Amphetamine Screen, Urine Negative Negative    Benzodiazepine Screen, Urine Negative Negative    Tricyclic Antidepressants Screen Negative Negative    Methadone Screen, Urine Negative Negative    Barbiturates Screen, Urine Negative Negative    Oxycodone Screen, Urine Negative Negative    Propoxyphene Screen Negative  Negative    Buprenorphine, Screen, Urine Negative Negative   STAT Lactic Acid, Reflex    Specimen: Blood   Result Value Ref Range    Lactate 3.9 (C) 0.5 - 2.0 mmol/L   ECG 12 Lead ED Triage Standing Order; Abdominal Pain (Upper)   Result Value Ref Range    QT Interval 0 ms    QTC Interval 0 ms   Green Top (Gel)   Result Value Ref Range    Extra Tube Hold for add-ons.    Lavender Top   Result Value Ref Range    Extra Tube hold for add-on    Gold Top - SST   Result Value Ref Range    Extra Tube Hold for add-ons.    Gray Top   Result Value Ref Range    Extra Tube Hold for add-ons.    Light Blue Top   Result Value Ref Range    Extra Tube Hold for add-ons.        If labs were ordered, I independently reviewed the results and considered them in treating the patient.    RADIOLOGY  CT Abdomen Pelvis With Contrast   Final Result         1. Mild thickening of the transverse colonic wall may relate to a mild colitis.   2. Mild diffuse hepatic steatosis.   3. Diverticulosis without evidence of diverticulitis.               Electronically signed by:  Shravan Palacio D.O.     3/20/2023 1:47 AM Mountain Time        [x] Radiologist's Report Reviewed:  I ordered and independently reviewed the above noted radiographic studies.  See radiologist's dictation for official interpretation.      PROCEDURES    Procedures    ECG 12 Lead ED Triage Standing Order; Abdominal Pain (Upper)   Preliminary Result   Test Reason : ED Triage Standing Order~   Blood Pressure :   */*   mmHG   Vent. Rate :   0 BPM     Atrial Rate :   0 BPM      P-R Int :   * ms          QRS Dur :   0 ms       QT Int :   0 ms       P-R-T Axes :   *   0   0 degrees      QTc Int :   0 ms      ** No QRS complexes found, no ECG analysis possible **   When compared with ECG of 05-DEC-2022 06:12,   Current undetermined rhythm precludes rhythm comparison, needs review      Referred By: EDMD           Confirmed By:           MEDICATIONS GIVEN IN ER    Medications   sodium chloride  0.9 % flush 10 mL (has no administration in time range)   piperacillin-tazobactam (ZOSYN) 3.375 g in iso-osmotic dextrose 50 ml (premix) (has no administration in time range)   Pharmacy Consult (has no administration in time range)   vancomycin (VANCOCIN) capsule 125 mg (has no administration in time range)   lactated ringers bolus 1,000 mL (has no administration in time range)   lactated ringers infusion (has no administration in time range)   Morphine sulfate (PF) injection 3 mg (has no administration in time range)   naloxone (NARCAN) injection 0.4 mg (has no administration in time range)   droperidol (INAPSINE) injection 2.5 mg (2.5 mg Intravenous Given 3/20/23 0148)   famotidine (PEPCID) injection 20 mg (20 mg Intravenous Given 3/20/23 0220)   sodium chloride 0.9 % bolus 1,000 mL (1,000 mL Intravenous New Bag 3/20/23 0220)   lactated ringers bolus 1,000 mL (0 mL Intravenous Stopped 3/20/23 0449)   iopamidol (ISOVUE-300) 61 % injection 100 mL (100 mL Intravenous Given 3/20/23 0255)       MEDICAL DECISION MAKING, PROGRESS, and CONSULTS   Medical Decision Making  Colitis presumed infectious: acute illness or injury  History of anxiety: chronic illness or injury  History of depression: chronic illness or injury  History of diverticulitis: chronic illness or injury  History of gastroesophageal reflux (GERD): chronic illness or injury  History of hypertension: chronic illness or injury  Nausea and vomiting, unspecified vomiting type: acute illness or injury  Amount and/or Complexity of Data Reviewed  Labs: ordered.  Radiology: ordered.      Risk  Prescription drug management.  Decision regarding hospitalization.          All labs have been independently reviewed by me.  All radiology studies have been reviewed by me and the radiologist dictating the report.  All EKG's have been independently viewed by me.    [] Discussed with radiology regarding test interpretation:    Discussion below represents my analysis of  pertinent findings related to patient's condition, differential diagnosis, treatment plan and final disposition.    Differential diagnosis:  The differential diagnosis associated with the patient's presentation includes: Dyspepsia, viral gastroenteritis, constipation, medication side effects, psychiatric illness, irritable bowel syndrome, abdominal wall pain, gallbladder disease, pancreatitis, diverticulitis, appendicitis, kidney stone, UTI, hernia, gastroparesis, food poisoning, DKA, hepatitis, bowel obstruction, colitis, eosinophilic gastroenteritis, adrenal insufficiency and others.    Additional sources  Discussed/ obtained information from independent historians:   [] Spouse  [] Parent  [] Family member  [x] Friend  [] EMS   [] Other:  External (non-ED) record review:   [] Inpatient record:   [] Office record:   [x] Outpatient record: Review of outpatient GI records demonstrating diagnosis of epigastric pain nausea and bloating by Dr. Godwin in 2020   [] Prior Outpatient labs:   [] Prior Outpatient radiology:   [] Primary Care record:   [] Outside ED record:   [] Other:   Patient's care impacted by:   [] Diabetes  [x] Hypertension  [] Hyperlipidemia  [] Hypothyroidism   [] Coronary Artery Disease   [] COPD   [] Cancer   [] Obesity   [x] Tobacco Abuse   [] Substance Abuse    [x] Anxiety   [x] Depression   [x] Other: GERD, diverticulitis  Care significantly affected by Social Determinants of Health (housing and economic circumstances, unemployment)    [] Yes     [x] No   If yes, Patient's care significantly limited by  Social Determinants of Health including:   [] Inadequate housing   [] Low income   [] Alcoholism and drug addiction in family   [] Problems related to primary support group   [] Unemployment   [] Problems related to employment   [] Other Social Determinants of Health:     Shared decision making: Reviewed work-up with patient at bedside demonstrating acute colitis with recommendation for admission  to hospitalist for further evaluation and treatment.    Orders placed during this visit:  Orders Placed This Encounter   Procedures   • Blood Culture - Blood,   • Blood Culture - Blood,   • COVID PRE-OP / PRE-PROCEDURE SCREENING ORDER (NO ISOLATION) - Swab, Nasopharynx   • Respiratory Panel PCR w/COVID-19(SARS-CoV-2) EMIR/NEEL/GABY/PAD/COR/MAD/LEANDRA In-House, NP Swab in UTM/VTM, 3-4 HR TAT - Swab, Nasopharynx   • Clostridioides difficile Toxin - Stool, Per Rectum   • Gastrointestinal Panel, PCR - Stool, Per Rectum   • Clostridioides difficile Toxin, PCR - Stool, Per Rectum   • CT Abdomen Pelvis With Contrast   • [place] Draw   • Comprehensive Metabolic Panel   • Lipase   • Single High Sensitivity Troponin T   • Urinalysis With Microscopic If Indicated (No Culture) - Urine, Clean Catch   • CBC Auto Differential   • Scan Slide   • Lactic Acid, Plasma   • Urine Drug Screen - Urine, Clean Catch   • STAT Lactic Acid, Reflex   • STAT Lactic Acid, Reflex   • Urinalysis, Microscopic Only - Urine, Clean Catch   • C-reactive Protein   • Sedimentation Rate   • Procalcitonin   • NPO Diet NPO Type: Strict NPO   • Undress & Gown   • Cardiac Monitoring   • Continuous Pulse Oximetry   • Vital Signs   • Code Status and Medical Interventions:   • Inpatient Gastroenterology Consult   • Inpatient Infectious Diseases Consult   • Patient Isolation Contact Spore   • Oxygen Therapy- Nasal Cannula; 2 LPM; Titrate for SPO2: 92%, Greater Than or Equal To   • ECG 12 Lead ED Triage Standing Order; Abdominal Pain (Upper)   • Insert Peripheral IV   • Initiate Observation Status   • ED Bed Request   • CBC & Differential   • Green Top (Gel)   • Lavender Top   • Gold Top - SST   • Gray Top   • Light Blue Top         ED Course:    ED Course as of 03/20/23 0504   Mon Mar 20, 2023   8599 In summary this is a 47-year-old female who presents to the ER with acute onset of nausea and vomiting that happened at 8 PM this evening.  Vomiting present on initial  interview and exam.  No additional acute or emergent findings demonstrated on exam.  Leukocytosis with WBCs 15.54, initial lactate of 5.8.  Responded well to IV fluids, droperidol and Pepcid administered in ED with symptomatic improvement and repeat lactate of 3.9.  CT scan of abdomen and pelvis demonstrated mild thickening of the transverse colonic wall may relate to a mild colitis.  Blood cultures obtained and broad-spectrum antibiotics initiated in the ED.  Hospitalist consulted for admission. [JG]   0500 Hospitalist agreeable to accept patient for admission [JG]      ED Course User Index  [JG] Feroz Hamlin PA            DIAGNOSIS  Final diagnoses:   Colitis presumed infectious   Nausea and vomiting, unspecified vomiting type   History of anxiety   History of depression   History of diverticulitis   History of gastroesophageal reflux (GERD)   History of hypertension       DISPOSITION    ED Disposition     ED Disposition   Decision to Admit    Condition   --    Comment   Level of Care: Telemetry [5]   Diagnosis: Colitis presumed infectious [075478]   Admitting Physician: NATALIE ARELLANO [[id number]]   Attending Physician: NATALIE ARELLANO [[id number]]               Please note that portions of this document were completed with voice recognition software.      Feroz Hamlin PA  03/20/23 0508

## 2023-03-21 VITALS
DIASTOLIC BLOOD PRESSURE: 78 MMHG | TEMPERATURE: 97.9 F | SYSTOLIC BLOOD PRESSURE: 106 MMHG | HEIGHT: 62 IN | HEART RATE: 88 BPM | OXYGEN SATURATION: 96 % | BODY MASS INDEX: 32.76 KG/M2 | WEIGHT: 178 LBS | RESPIRATION RATE: 16 BRPM

## 2023-03-21 LAB
ANION GAP SERPL CALCULATED.3IONS-SCNC: 6 MMOL/L (ref 5–15)
BASOPHILS # BLD AUTO: 0.02 10*3/MM3 (ref 0–0.2)
BASOPHILS NFR BLD AUTO: 0.3 % (ref 0–1.5)
BUN SERPL-MCNC: 6 MG/DL (ref 6–20)
BUN/CREAT SERPL: 10 (ref 7–25)
CALCIUM SPEC-SCNC: 8.5 MG/DL (ref 8.6–10.5)
CHLORIDE SERPL-SCNC: 104 MMOL/L (ref 98–107)
CO2 SERPL-SCNC: 28 MMOL/L (ref 22–29)
CREAT SERPL-MCNC: 0.6 MG/DL (ref 0.57–1)
DEPRECATED RDW RBC AUTO: 46.4 FL (ref 37–54)
EGFRCR SERPLBLD CKD-EPI 2021: 110.9 ML/MIN/1.73
EOSINOPHIL # BLD AUTO: 0.12 10*3/MM3 (ref 0–0.4)
EOSINOPHIL NFR BLD AUTO: 1.9 % (ref 0.3–6.2)
ERYTHROCYTE [DISTWIDTH] IN BLOOD BY AUTOMATED COUNT: 13.4 % (ref 12.3–15.4)
GLUCOSE SERPL-MCNC: 97 MG/DL (ref 65–99)
HCT VFR BLD AUTO: 32.7 % (ref 34–46.6)
HGB BLD-MCNC: 10.3 G/DL (ref 12–15.9)
IMM GRANULOCYTES # BLD AUTO: 0.02 10*3/MM3 (ref 0–0.05)
IMM GRANULOCYTES NFR BLD AUTO: 0.3 % (ref 0–0.5)
LYMPHOCYTES # BLD AUTO: 3.1 10*3/MM3 (ref 0.7–3.1)
LYMPHOCYTES NFR BLD AUTO: 48.2 % (ref 19.6–45.3)
MCH RBC QN AUTO: 30 PG (ref 26.6–33)
MCHC RBC AUTO-ENTMCNC: 31.5 G/DL (ref 31.5–35.7)
MCV RBC AUTO: 95.3 FL (ref 79–97)
MONOCYTES # BLD AUTO: 0.53 10*3/MM3 (ref 0.1–0.9)
MONOCYTES NFR BLD AUTO: 8.2 % (ref 5–12)
NEUTROPHILS NFR BLD AUTO: 2.64 10*3/MM3 (ref 1.7–7)
NEUTROPHILS NFR BLD AUTO: 41.1 % (ref 42.7–76)
NRBC BLD AUTO-RTO: 0 /100 WBC (ref 0–0.2)
PLATELET # BLD AUTO: 219 10*3/MM3 (ref 140–450)
PMV BLD AUTO: 9.1 FL (ref 6–12)
POTASSIUM SERPL-SCNC: 4.3 MMOL/L (ref 3.5–5.2)
RBC # BLD AUTO: 3.43 10*6/MM3 (ref 3.77–5.28)
SODIUM SERPL-SCNC: 138 MMOL/L (ref 136–145)
WBC NRBC COR # BLD: 6.43 10*3/MM3 (ref 3.4–10.8)

## 2023-03-21 PROCEDURE — 85025 COMPLETE CBC W/AUTO DIFF WBC: CPT | Performed by: PHYSICIAN ASSISTANT

## 2023-03-21 PROCEDURE — 96361 HYDRATE IV INFUSION ADD-ON: CPT

## 2023-03-21 PROCEDURE — 80048 BASIC METABOLIC PNL TOTAL CA: CPT | Performed by: PHYSICIAN ASSISTANT

## 2023-03-21 PROCEDURE — 99238 HOSP IP/OBS DSCHRG MGMT 30/<: CPT | Performed by: INTERNAL MEDICINE

## 2023-03-21 PROCEDURE — G0378 HOSPITAL OBSERVATION PER HR: HCPCS

## 2023-03-21 RX ORDER — ONDANSETRON 4 MG/1
4 TABLET, ORALLY DISINTEGRATING ORAL EVERY 8 HOURS PRN
Qty: 15 TABLET | Refills: 0 | Status: SHIPPED | OUTPATIENT
Start: 2023-03-21

## 2023-03-21 RX ADMIN — DESVENLAFAXINE 50 MG: 50 TABLET, FILM COATED, EXTENDED RELEASE ORAL at 08:12

## 2023-03-21 RX ADMIN — SODIUM CHLORIDE, POTASSIUM CHLORIDE, SODIUM LACTATE AND CALCIUM CHLORIDE 150 ML/HR: 600; 310; 30; 20 INJECTION, SOLUTION INTRAVENOUS at 05:40

## 2023-03-21 NOTE — PROGRESS NOTES
INFECTIOUS DISEASE Progress Note    Mishel Freeman  1975  [id number]    Admission Date: 3/20/2023      Requesting Provider: No ref. provider found  Evaluating Physician: Atul Gamble MD    Reason for Consultation: Gastroenteritis with a history of C. difficile colitis    History of present illness:    3/20/23: Patient is a 47 y.o. female who is seen today for evaluation of nausea/vomiting, abdominal pain, and diarrhea with a history of prior C. difficile colitis and norovirus in 12/19.  She became severely ill with refractory nausea and vomiting yesterday.  She also reports some upper abdominal pain and then subsequently developed diarrhea.  She reported contact with an ill child approximately 3 days ago.  She presented to the emergency room where she was noted to have leukocytosis with a white blood cell count of 15.5, lactic acidosis with a lactic acid level of 3.9, and procalcitonin of 0.43, and a C-reactive protein of 1.43.  An abdominal/pelvic CT scan revealed a diffuse colitis which was most prominent in the transverse colon.  Her nausea and vomiting have improved somewhat today but she still has diarrhea.  She has been afebrile.  She reports no antibiotic therapy prior to admission for at least 4-5 months.    3/21/23:  She remains afebrile. Her nausea and vomiting are much better.  She has decreased abdominal discomfort.  She would like to go home.    Past Medical History:   Diagnosis Date   • Anxiety    • Depression    • Diverticulitis    • Eczema    • GERD (gastroesophageal reflux disease)    • Headache    • Hypertension    • Lung nodule    • Tobacco abuse        Past Surgical History:   Procedure Laterality Date   • BRONCHOSCOPY N/A 7/17/2019    Procedure: DARIN, RADIAL EBUS WITH FLUORO BRONCH;  Surgeon: Nikhil Knutson MD;  [facility];  Service: Pulmonary   • CHOLECYSTECTOMY  1991   • COLONOSCOPY  2016, 2017   • ENDOMETRIAL ABLATION W/ NOVASURE  2010    WITH LAP  BTL   • VAGINAL HYSTERECTOMY  2010    Partial        Family History   Problem Relation Age of Onset   • Breast cancer Paternal Aunt         40's   • Cancer Mother    • Diabetes Father    • Diabetes Brother    • Cancer Maternal Aunt    • Ovarian cancer Neg Hx        Social History     Socioeconomic History   • Marital status:    • Number of children: 1   Tobacco Use   • Smoking status: Every Day     Packs/day: 0.25     Years: 30.00     Pack years: 7.50     Types: Cigarettes   • Smokeless tobacco: Never   • Tobacco comments:     5 a day    Vaping Use   • Vaping Use: Never used   Substance and Sexual Activity   • Alcohol use: Yes     Alcohol/week: 1.0 standard drink     Types: 1 Cans of beer per week     Comment: socially   • Drug use: No   • Sexual activity: Defer       Allergies   Allergen Reactions   • Macrobid [Nitrofurantoin Macrocrystal] Hives and Shortness Of Breath         Medication:    Current Facility-Administered Medications:   •  acetaminophen (TYLENOL) tablet 650 mg, 650 mg, Oral, Q6H PRN, Cydney Lechuga DO  •  desvenlafaxine (PRISTIQ) 24 hr tablet 50 mg, 50 mg, Oral, Daily, Tom Burnham PA-C, 50 mg at 03/20/23 1221  •  HYDROcodone-acetaminophen (NORCO) 5-325 MG per tablet 1 tablet, 1 tablet, Oral, Q6H PRN, Cydney Lechuga DO, 1 tablet at 03/20/23 2036  •  lactated ringers infusion, 150 mL/hr, Intravenous, Continuous, Jacob Bradford, , Last Rate: 150 mL/hr at 03/20/23 1315, 150 mL/hr at 03/20/23 1315  •  lactated ringers infusion, 150 mL/hr, Intravenous, Continuous, Cydney Lechuga DO, Last Rate: 150 mL/hr at 03/21/23 0540, 150 mL/hr at 03/21/23 0540  •  melatonin tablet 5 mg, 5 mg, Oral, Nightly PRN, Tom Burnham PA-C, 5 mg at 03/20/23 2036  •  Morphine sulfate (PF) injection 3 mg, 3 mg, Intravenous, Q3H PRN, Jacob Bradford, DO  •  naloxone (NARCAN) injection 0.4 mg, 0.4 mg, Intravenous, PRN, Jacob Bradford, DO  •  ondansetron (ZOFRAN) tablet 4 mg, 4 mg, Oral,  Q6H PRN **OR** ondansetron (ZOFRAN) injection 4 mg, 4 mg, Intravenous, Q6H PRN, Tom Burnham PA-C, 4 mg at 23 1111  •  Pharmacy Consult, , Does not apply, Continuous PRN, Sanchez, Jacob, DO  •  sodium chloride 0.9 % flush 10 mL, 10 mL, Intravenous, Q12H, Tom Burnham PA-C, 10 mL at 23 2036  •  sodium chloride 0.9 % flush 10 mL, 10 mL, Intravenous, PRN, Tom Burnham PA-C  •  sodium chloride 0.9 % infusion 40 mL, 40 mL, Intravenous, PRN, Tom Burnham PA-C    Antibiotics:  Anti-Infectives (From admission, onward)    Ordered     Dose/Rate Route Frequency Start Stop    23 0453  piperacillin-tazobactam (ZOSYN) 3.375 g in iso-osmotic dextrose 50 ml (premix)        Ordering Provider: Feroz Hamlin PA    3.375 g Intravenous Once 23 0455 23 0644            Review of Systems:    See HPI      Physical Exam:   Vital Signs  Temp (24hrs), Av.9 °F (36.6 °C), Min:97.7 °F (36.5 °C), Max:98.2 °F (36.8 °C)    Temp  Min: 97.7 °F (36.5 °C)  Max: 98.2 °F (36.8 °C)  BP  Min: 111/69  Max: 130/93  Pulse  Min: 77  Max: 107  Resp  Min: 14  Max: 18  SpO2  Min: 92 %  Max: 98 %    GENERAL: Awake and alert, in no acute distress.   HEENT: Normocephalic, atraumatic.  PERRL. EOMI. No conjunctival injection. No icterus. Oropharynx clear without evidence of thrush or exudate.   NECK: Supple  HEART: RRR; No murmur  LUNGS: Clear to auscultation bilaterally without wheezing, rales, rhonchi. Normal respiratory effort. Nonlabored.  ABDOMEN: Soft, nontender, nondistended.  No rebound or guarding. NO mass or HSM.  EXT:  No cyanosis, clubbing or edema. No cord.  :  Without Roberts catheter.  MSK: No joint effusions or erythema  SKIN: Warm and dry without cutaneous eruptions on Inspection/palpation.    NEURO: Oriented to PPT.  Motor 5/5 strength  PSYCHIATRIC: Normal insight and judgment. Cooperative with PE    Laboratory Data    Results from last 7 days   Lab Units  03/21/23  0334 03/20/23  0032   WBC 10*3/mm3 6.43 15.54*   HEMOGLOBIN g/dL 10.3* 15.0   HEMATOCRIT % 32.7* 44.2   PLATELETS 10*3/mm3 219 262     Results from last 7 days   Lab Units 03/21/23  0334   SODIUM mmol/L 138   POTASSIUM mmol/L 4.3   CHLORIDE mmol/L 104   CO2 mmol/L 28.0   BUN mg/dL 6   CREATININE mg/dL 0.60   GLUCOSE mg/dL 97   CALCIUM mg/dL 8.5*     Results from last 7 days   Lab Units 03/20/23  0236   ALK PHOS U/L 62   BILIRUBIN mg/dL 0.8   ALT (SGPT) U/L 25   AST (SGOT) U/L 20     Results from last 7 days   Lab Units 03/20/23  0032   SED RATE mm/hr 20     Results from last 7 days   Lab Units 03/20/23  0236   CRP mg/dL 1.43*     Results from last 7 days   Lab Units 03/20/23  1904   LACTATE mmol/L 2.0             Estimated Creatinine Clearance: 112.8 mL/min (by C-G formula based on SCr of 0.6 mg/dL).      Microbiology:  No results found for: ACANTHNAEG, AFBCX, BPERTUSSISCX, BLOODCX  No results found for: BCIDPCR, CXREFLEX, CSFCX, CULTURETIS  No results found for: CULTURES, HSVCX, URCX  No results found for: EYECULTURE, GCCX, HSVCULTURE, LABHSV  No results found for: LEGIONELLA, MRSACX, MUMPSCX, MYCOPLASCX  No results found for: NOCARDIACX, STOOLCX  No results found for: THROATCX, UNSTIMCULT, URINECX, CULTURE, VZVCULTUR  No results found for: VIRALCULTU, WOUNDCX        Radiology:  Imaging Results (Last 72 Hours)     Procedure Component Value Units Date/Time    CT Abdomen Pelvis With Contrast [[id number]] Collected: 03/20/23 0144     Updated: 03/20/23 0349    Narrative:      EXAMINATION: CT ABDOMEN AND PELVIS WITH IV CONTRAST       DATE OF EXAMINATION: 8/20/2023.    COMPARISON: 12/5/2022..    INDICATION: Abdominal pain with nausea and vomiting.    PROCEDURE:  Axial CT of the abdomen and pelvis was performed with contrast and sagittal and coronal reformatted images were performed.  100 mm of Isovue-300 was given intravenously. CT dose lowering techniques were used, to include: automated exposure   control,  adjustment for patient size, and/or use of iterative reconstruction.    FINDINGS:    LOWER CHEST :  The visualized lung bases are clear.  There are no pleural or pericardial effusions.    ABDOMEN:    Liver and Biliary system:  There is a subcentimeter hypodensity right lobe of liver that is too small fully characterize. Mild diffuse decreased attenuation of liver is compatible fatty liver infiltration.    Adrenal glands:  Normal.    Kidneys and ureters: Normal.    Spleen:  Normal.    Pancreas:  Normal.    Gallbladder:  Surgically absent.    Lymph nodes, Peritoneum and mesentery:  There is no mesenteric or retroperitoneal lymphadenopathy.    Gastrointestinal tract:  There are no dilated loops of bowel or free intraperitoneal air.    The appendix is normal. There is mild descending colonic and sigmoid colonic diverticulosis without evidence of diverticulitis. There is some mild thickening of   the transverse colonic wall which may relate to a mild colitis.    Aorta/IVC:   There is mild plaque seen throughout the abdominal aorta without evidence of aneurysmal dilation or dissection.  IVC normal.    Abdominal wall:  Normal.    PELVIS:    Fluid: There is no free fluid in the pelvis.    Lymph Nodes:  There is no pelvic or inguinal lymphadenopathy..    Urinary bladder:  Normal.    BONES:  There are no osseous destructive lesions..    ADDITIONAL  SIGNIFICANT FINDINGS:  Prior hysterectomy.      Impression:          1. Mild thickening of the transverse colonic wall may relate to a mild colitis.  2. Mild diffuse hepatic steatosis.  3. Diverticulosis without evidence of diverticulitis.          Electronically signed by:  Shravan Palacio D.O.    3/20/2023 1:47 AM Mountain Time      I read her radiographic images with Dr. Guerrero in radiology.      Impression:   1.  Norovirus gastroenteritis/colitis-  now clinically improved.  She should be able to discharge today.  2.  History of C. difficile infection-with no evidence of  relapse  3.  Lactic acidosis-secondary to volume depletion  4.  Leukocytosis/neutrophilia-secondary to gastroenteritis    PLAN/RECOMMENDATIONS:  1.  Continue off of antibiotic therapy  2.  Discharge home    I discussed her situation with Dr. Lechuga today.        Atul Gamble MD  3/21/2023  07:07 EDT

## 2023-03-21 NOTE — DISCHARGE SUMMARY
[facility] Medicine Services  DISCHARGE SUMMARY    Patient Name: Mishel Freeman  : 1975  MRN: [id number]    Date of Admission: 3/20/2023  1:33 AM  Date of Discharge:  3/21/2023  Primary Care Physician: Anastacia Jose MD    Consults     Date and Time Order Name Status Description    3/20/2023  8:32 AM Inpatient Gastroenterology Consult Completed     3/20/2023  5:02 AM Inpatient Infectious Diseases Consult Completed     3/20/2023  5:02 AM Inpatient Gastroenterology Consult            Hospital Course     Presenting Problem:   Colitis presumed infectious [K52.9]    Active Hospital Problems    Diagnosis  POA   • **Colitis presumed infectious [K52.9]  Yes   • Hyperglycemia [R73.9]  Yes   • GERD  [K21.9]  Yes   • H/O Clostridium difficile/norovirus infection (2019)  [Z86.19]  Yes   • HTN (hypertension) [I10]  Yes      Resolved Hospital Problems   No resolved problems to display.          Hospital Course:  Mishel Freeman is a 47 y.o. female with past medical history of C. difficile colitis, GERD, hypertension, chronic tobacco use who presents to the ER with complaints of nausea, vomiting, and abdominal pain.  Patient reports that her pain started late yesterday evening.  She started having nausea and vomiting.     Sepsis - POA  Transverse Colitis - Norovirus  Lactic Acidosis - resolved  -CT abd/pelvis with mild thickening of the transverse colonic wall may relate to a mild colitis.  -C. Diff toxin is negative, GI PCR positive for norovirus  -continue supportive care at discharge, added some home anti-emetics  -tolerating diet and oral hydration     HTN  - currently normotensive     GERD  -PPI     Hyperglycemia  -A1C 5.7    Discharge Follow Up Recommendations for outpatient labs/diagnostics:  - f/u with PCP in one week  - glutamine 5g TID x 8 weeks    Day of Discharge     HPI:   Patient resting in bed. No issues overnight. Diarrhea is improving, tolerating diet,  keeping liquids down    Review of Systems  Gen- No fevers, chills  CV- No chest pain, palpitations  Resp- No cough, dyspnea  GI- per above      Vital Signs:   Temp:  [97.7 °F (36.5 °C)-98.2 °F (36.8 °C)] 97.9 °F (36.6 °C)  Heart Rate:  [77-98] 88  Resp:  [14-18] 16  BP: (106-119)/(69-79) 106/78      Physical Exam:  Constitutional: No acute distress, awake, alert  HENT: NCAT, mucous membranes moist  Respiratory: Clear to auscultation bilaterally, respiratory effort normal   Cardiovascular: RRR, no murmurs, rubs, or gallops  Gastrointestinal: Positive bowel sounds, soft, nontender, nondistended  Musculoskeletal: No bilateral ankle edema  Psychiatric: Appropriate affect, cooperative  Neurologic: Oriented x 3, speech clear, non-focal  Skin: No rashes      Pertinent  and/or Most Recent Results     LAB RESULTS:      Lab 03/21/23  0334 03/20/23  1904 03/20/23  1508 03/20/23  1310 03/20/23  1053 03/20/23  0236 03/20/23  0032   WBC 6.43  --   --   --   --   --  15.54*   HEMOGLOBIN 10.3*  --   --   --   --   --  15.0   HEMATOCRIT 32.7*  --   --   --   --   --  44.2   PLATELETS 219  --   --   --   --   --  262   NEUTROS ABS 2.64  --   --   --   --   --  13.20*   IMMATURE GRANS (ABS) 0.02  --   --   --   --   --  0.07*   LYMPHS ABS 3.10  --   --   --   --   --  1.30   MONOS ABS 0.53  --   --   --   --   --  0.79   EOS ABS 0.12  --   --   --   --   --  0.15   MCV 95.3  --   --   --   --   --  89.5   SED RATE  --   --   --   --   --   --  20   CRP  --   --   --   --   --  1.43*  --    PROCALCITONIN  --   --   --   --   --  0.43*  --    LACTATE  --  2.0 2.7* 3.5* 5.2* 3.9* 5.8*         Lab 03/21/23  0334 03/20/23  0236 03/20/23  0032   SODIUM 138 138  --    POTASSIUM 4.3 3.8  --    CHLORIDE 104 101  --    CO2 28.0 18.0*  --    ANION GAP 6.0 19.0*  --    BUN 6 16  --    CREATININE 0.60 0.81  --    EGFR 110.9 89.7  --    GLUCOSE 97 153*  --    CALCIUM 8.5* 9.8  --    HEMOGLOBIN A1C  --   --  5.70*         Lab 03/20/23 0236   TOTAL  PROTEIN 7.5   ALBUMIN 4.5   GLOBULIN 3.0   ALT (SGPT) 25   AST (SGOT) 20   BILIRUBIN 0.8   ALK PHOS 62   LIPASE 20         Lab 03/20/23  0236   HSTROP T 8                 Brief Urine Lab Results  (Last result in the past 365 days)      Color   Clarity   Blood   Leuk Est   Nitrite   Protein   CREAT   Urine HCG        03/20/23 0409 Yellow   Clear   Small (1+)   Negative   Negative   Negative               Microbiology Results (last 10 days)     Procedure Component Value - Date/Time    Clostridioides difficile Toxin - Stool, Per Rectum [[id number]]  (Normal) Collected: 03/20/23 1054    Lab Status: Final result Specimen: Stool from Per Rectum Updated: 03/20/23 1253    Narrative:      The following orders were created for panel order Clostridioides difficile Toxin - Stool, Per Rectum.  Procedure                               Abnormality         Status                     ---------                               -----------         ------                     Clostridioides difficile...[[id number]]  Normal              Final result                 Please view results for these tests on the individual orders.    Gastrointestinal Panel, PCR - Stool, Per Rectum [[id number]]  (Abnormal) Collected: 03/20/23 1054    Lab Status: Final result Specimen: Stool from Per Rectum Updated: 03/20/23 1414     Campylobacter Not Detected     Plesiomonas shigelloides Not Detected     Salmonella Not Detected     Vibrio Not Detected     Vibrio cholerae Not Detected     Yersinia enterocolitica Not Detected     Enteroaggregative E. coli (EAEC) Not Detected     Enteropathogenic E. coli (EPEC) Not Detected     Enterotoxigenic E. coli (ETEC) lt/st Not Detected     Shiga-like toxin-producing E. coli (STEC) stx1/stx2 Not Detected     Shigella/Enteroinvasive E. coli (EIEC) Not Detected     Cryptosporidium Not Detected     Cyclospora cayetanensis Not Detected     Entamoeba histolytica Not Detected     Giardia lamblia Not Detected     Adenovirus F40/41 Not  Detected     Astrovirus Not Detected     Norovirus GI/GII Detected     Rotavirus A Not Detected     Sapovirus (I, II, IV or V) Not Detected    Clostridioides difficile Toxin, PCR - Stool, Per Rectum [[id number]]  (Normal) Collected: 03/20/23 1054    Lab Status: Final result Specimen: Stool from Per Rectum Updated: 03/20/23 1253     C. Difficile Toxins by PCR Not Detected    Narrative:      The result indicates the absence of toxigenic C. difficile from stool specimen.     Blood Culture - Blood, Arm, Left [[id number]]  (Normal) Collected: 03/20/23 0610    Lab Status: Preliminary result Specimen: Blood from Arm, Left Updated: 03/21/23 0645     Blood Culture No growth at 24 hours    COVID PRE-OP / PRE-PROCEDURE SCREENING ORDER (NO ISOLATION) - Swab, Nasopharynx [[id number]]  (Normal) Collected: 03/20/23 0610    Lab Status: Final result Specimen: Swab from Nasopharynx Updated: 03/20/23 0714    Narrative:      The following orders were created for panel order COVID PRE-OP / PRE-PROCEDURE SCREENING ORDER (NO ISOLATION) - Swab, Nasopharynx.  Procedure                               Abnormality         Status                     ---------                               -----------         ------                     Respiratory Panel PCR w/...[[id number]]  Normal              Final result                 Please view results for these tests on the individual orders.    Respiratory Panel PCR w/COVID-19(SARS-CoV-2) EMIR/NEEL/GABY/PAD/COR/MAD/LEANDRA In-House, NP Swab in UTM/VTM, 3-4 HR TAT - Swab, Nasopharynx [[id number]]  (Normal) Collected: 03/20/23 0610    Lab Status: Final result Specimen: Swab from Nasopharynx Updated: 03/20/23 0714     ADENOVIRUS, PCR Not Detected     Coronavirus 229E Not Detected     Coronavirus HKU1 Not Detected     Coronavirus NL63 Not Detected     Coronavirus OC43 Not Detected     COVID19 Not Detected     Human Metapneumovirus Not Detected     Human Rhinovirus/Enterovirus Not Detected     Influenza A PCR Not  Detected     Influenza B PCR Not Detected     Parainfluenza Virus 1 Not Detected     Parainfluenza Virus 2 Not Detected     Parainfluenza Virus 3 Not Detected     Parainfluenza Virus 4 Not Detected     RSV, PCR Not Detected     Bordetella pertussis pcr Not Detected     Bordetella parapertussis PCR Not Detected     Chlamydophila pneumoniae PCR Not Detected     Mycoplasma pneumo by PCR Not Detected    Narrative:      In the setting of a positive respiratory panel with a viral infection PLUS a negative procalcitonin without other underlying concern for bacterial infection, consider observing off antibiotics or discontinuation of antibiotics and continue supportive care. If the respiratory panel is positive for atypical bacterial infection (Bordetella pertussis, Chlamydophila pneumoniae, or Mycoplasma pneumoniae), consider antibiotic de-escalation to target atypical bacterial infection.    Blood Culture - Blood, Wrist, Right [[id number]]  (Normal) Collected: 03/20/23 0450    Lab Status: Preliminary result Specimen: Blood from Wrist, Right Updated: 03/21/23 0515     Blood Culture No growth at 24 hours          CT Abdomen Pelvis With Contrast    Result Date: 3/20/2023  EXAMINATION: CT ABDOMEN AND PELVIS WITH IV CONTRAST   DATE OF EXAMINATION: 8/20/2023. COMPARISON: 12/5/2022.. INDICATION: Abdominal pain with nausea and vomiting. PROCEDURE:  Axial CT of the abdomen and pelvis was performed with contrast and sagittal and coronal reformatted images were performed.  100 mm of Isovue-300 was given intravenously. CT dose lowering techniques were used, to include: automated exposure control, adjustment for patient size, and/or use of iterative reconstruction. FINDINGS: LOWER CHEST :  The visualized lung bases are clear.  There are no pleural or pericardial effusions. ABDOMEN: Liver and Biliary system:  There is a subcentimeter hypodensity right lobe of liver that is too small fully characterize. Mild diffuse decreased attenuation  of liver is compatible fatty liver infiltration. Adrenal glands:  Normal. Kidneys and ureters: Normal. Spleen:  Normal. Pancreas:  Normal. Gallbladder:  Surgically absent. Lymph nodes, Peritoneum and mesentery:  There is no mesenteric or retroperitoneal lymphadenopathy. Gastrointestinal tract:  There are no dilated loops of bowel or free intraperitoneal air.    The appendix is normal. There is mild descending colonic and sigmoid colonic diverticulosis without evidence of diverticulitis. There is some mild thickening of the transverse colonic wall which may relate to a mild colitis. Aorta/IVC:   There is mild plaque seen throughout the abdominal aorta without evidence of aneurysmal dilation or dissection.  IVC normal. Abdominal wall:  Normal. PELVIS: Fluid: There is no free fluid in the pelvis. Lymph Nodes:  There is no pelvic or inguinal lymphadenopathy.. Urinary bladder:  Normal. BONES:  There are no osseous destructive lesions.. ADDITIONAL  SIGNIFICANT FINDINGS:  Prior hysterectomy.     1. Mild thickening of the transverse colonic wall may relate to a mild colitis. 2. Mild diffuse hepatic steatosis. 3. Diverticulosis without evidence of diverticulitis. Electronically signed by:  Shravan Palacio D.O.  3/20/2023 1:47 AM Mountain Time              Results for orders placed during the hospital encounter of 10/18/22    Adult Transthoracic Echo Complete W/ Cont if Necessary Per Protocol    Interpretation Summary  •  Left ventricular ejection fraction appears to be 56 - 60%. Left ventricular systolic function is normal.  •  Left ventricular wall thickness is consistent with mild concentric hypertrophy.      Plan for Follow-up of Pending Labs/Results:   Pending Labs     Order Current Status    Blood Culture - Blood, Arm, Left Preliminary result    Blood Culture - Blood, Wrist, Right Preliminary result        Discharge Details        Discharge Medications      New Medications      Instructions Start Date   ondansetron ODT  4 MG disintegrating tablet  Commonly known as: ZOFRAN-ODT   4 mg, Translingual, Every 8 Hours PRN         Continue These Medications      Instructions Start Date   acetaminophen 500 MG tablet  Commonly known as: TYLENOL   1,000 mg, Oral, Every 6 Hours PRN      desvenlafaxine 50 MG 24 hr tablet  Commonly known as: PRISTIQ   100 mg, Oral, Daily, Pt taking 100 mg daily      lisinopril 5 MG tablet  Commonly known as: PRINIVIL,ZESTRIL   5 mg, Oral, Daily      vilazodone 10 MG tablet tablet  Commonly known as: VIIBRYD   10 mg, Oral, Daily             Allergies   Allergen Reactions   • Macrobid [Nitrofurantoin Macrocrystal] Hives and Shortness Of Breath         Discharge Disposition:  Home or Self Care    Diet:  Hospital:  Diet Order   Procedures   • Diet: Cardiac Diets; Healthy Heart (2-3 Na+); Texture: Regular Texture (IDDSI 7); Fluid Consistency: Thin (IDDSI 0)       Activity:  - as tolerated    Restrictions or Other Recommendations:  - none       CODE STATUS:    Code Status and Medical Interventions:   Ordered at: 03/20/23 0555     Code Status (Patient has no pulse and is not breathing):    CPR (Attempt to Resuscitate)     Medical Interventions (Patient has pulse or is breathing):    Full Support       No future appointments.              Cydney Lechuga DO  03/21/23      Time Spent on Discharge:  I spent  28 minutes on this discharge activity which included: face-to-face encounter with the patient, reviewing the data in the system, coordination of the care with the nursing staff as well as consultants, documentation, and entering orders.

## 2023-03-21 NOTE — CASE MANAGEMENT/SOCIAL WORK
Continued Stay Note  [facility]     Patient Name: Mishel Freeman  MRN: [id number]  Today's Date: 3/21/2023    Admit Date: 3/20/2023    Plan: Home with family   Discharge Plan     Row Name 03/21/23 0855       Plan    Plan Home with family    Patient/Family in Agreement with Plan yes    Plan Comments Spoke with patient at bedside. Plan is home with family. Patient denies any discharge needs at this time. CM will continue to follow.    Final Discharge Disposition Code 01 - home or self-care               Discharge Codes    No documentation.                     Sina Deng RN

## 2023-03-21 NOTE — PLAN OF CARE
Goal Outcome Evaluation:  Plan of Care Reviewed With: patient           Outcome Evaluation: patient being discharged

## 2023-03-21 NOTE — CASE MANAGEMENT/SOCIAL WORK
Case Management Discharge Note      Final Note: Plan is home with family. Family will transport. Patient denies any discharge needs.         Selected Continued Care - Admitted Since 3/20/2023     Destination    No services have been selected for the patient.              Durable Medical Equipment    No services have been selected for the patient.              Dialysis/Infusion    No services have been selected for the patient.              Home Medical Care    No services have been selected for the patient.              Therapy    No services have been selected for the patient.              Community Resources    No services have been selected for the patient.              Community & DME    No services have been selected for the patient.                       Final Discharge Disposition Code: 01 - home or self-care

## 2023-03-21 NOTE — PLAN OF CARE
Goal Outcome Evaluation:  Plan of Care Reviewed With: patient       VSS. Alert and oriented. NSR. Good urine output. Still having bowel movements. Slept well. SCUDS on. Norco given once. Melatonin given. Fluids running. No c/o nausea.         Progress: improving     Problem: Adult Inpatient Plan of Care  Goal: Plan of Care Review  Outcome: Ongoing, Progressing  Flowsheets (Taken 3/21/2023 0105)  Progress: improving  Plan of Care Reviewed With: patient  Goal: Patient-Specific Goal (Individualized)  Outcome: Ongoing, Progressing  Goal: Absence of Hospital-Acquired Illness or Injury  Outcome: Ongoing, Progressing  Intervention: Identify and Manage Fall Risk  Recent Flowsheet Documentation  Taken 3/20/2023 2200 by Cyndy Stevenson RN  Safety Promotion/Fall Prevention:   activity supervised   assistive device/personal items within reach  Taken 3/20/2023 2000 by Cyndy Stevenson RN  Safety Promotion/Fall Prevention:   activity supervised   assistive device/personal items within reach  Intervention: Prevent Skin Injury  Recent Flowsheet Documentation  Taken 3/20/2023 2200 by Cyndy Stevenson RN  Body Position: position changed independently  Skin Protection:   adhesive use limited   skin-to-skin areas padded   skin-to-device areas padded   tubing/devices free from skin contact  Taken 3/20/2023 2000 by Cyndy Stevenson RN  Body Position: position changed independently  Intervention: Prevent and Manage VTE (Venous Thromboembolism) Risk  Recent Flowsheet Documentation  Taken 3/20/2023 2200 by Cyndy Stevenson RN  Activity Management: up ad [place]  VTE Prevention/Management:   bilateral   sequential compression devices on  Taken 3/20/2023 2000 by Cyndy Stevenson RN  Activity Management:   activity adjusted per tolerance   activity encouraged   up ad [place]  Range of Motion: active ROM (range of motion) encouraged  Goal: Optimal Comfort and Wellbeing  Outcome: Ongoing, Progressing  Intervention: Monitor Pain and Promote  Comfort  Recent Flowsheet Documentation  Taken 3/20/2023 2000 by Cyndy Stevenson, RN  Pain Management Interventions:   care clustered   see MAR  Intervention: Provide Person-Centered Care  Recent Flowsheet Documentation  Taken 3/20/2023 2000 by Cyndy Stevenson, RN  Trust Relationship/Rapport:   care explained   choices provided  Goal: Readiness for Transition of Care  Outcome: Ongoing, Progressing

## 2023-03-25 LAB
BACTERIA SPEC AEROBE CULT: NORMAL
BACTERIA SPEC AEROBE CULT: NORMAL

## 2023-03-29 LAB
QT INTERVAL: 336 MS
QTC INTERVAL: 437 MS

## 2024-03-28 ENCOUNTER — HOSPITAL ENCOUNTER (EMERGENCY)
Facility: HOSPITAL | Age: 49
Discharge: HOME | End: 2024-03-28
Attending: STUDENT IN AN ORGANIZED HEALTH CARE EDUCATION/TRAINING PROGRAM
Payer: COMMERCIAL

## 2024-03-28 ENCOUNTER — APPOINTMENT (OUTPATIENT)
Dept: CT IMAGING | Facility: HOSPITAL | Age: 49
End: 2024-03-28
Payer: COMMERCIAL

## 2024-03-28 VITALS
DIASTOLIC BLOOD PRESSURE: 72 MMHG | BODY MASS INDEX: 34.04 KG/M2 | HEIGHT: 62 IN | WEIGHT: 185 LBS | RESPIRATION RATE: 18 BRPM | TEMPERATURE: 98.5 F | OXYGEN SATURATION: 94 % | SYSTOLIC BLOOD PRESSURE: 124 MMHG | HEART RATE: 71 BPM

## 2024-03-28 DIAGNOSIS — G43.809 OTHER MIGRAINE WITHOUT STATUS MIGRAINOSUS, NOT INTRACTABLE: Primary | ICD-10-CM

## 2024-03-28 DIAGNOSIS — M54.2 NECK PAIN ON LEFT SIDE: ICD-10-CM

## 2024-03-28 DIAGNOSIS — R03.0 TRANSIENT HYPERTENSION: ICD-10-CM

## 2024-03-28 LAB
ALBUMIN SERPL-MCNC: 4.4 G/DL (ref 3.5–5.2)
ALBUMIN/GLOB SERPL: 1.7 G/DL
ALP SERPL-CCNC: 60 U/L (ref 39–117)
ALT SERPL W P-5'-P-CCNC: 25 U/L (ref 1–33)
ANION GAP SERPL CALCULATED.3IONS-SCNC: 10 MMOL/L (ref 5–15)
AST SERPL-CCNC: 17 U/L (ref 1–32)
BACTERIA UR QL AUTO: NORMAL /HPF
BASOPHILS # BLD AUTO: 0.03 10*3/MM3 (ref 0–0.2)
BASOPHILS NFR BLD AUTO: 0.4 % (ref 0–1.5)
BILIRUB SERPL-MCNC: 0.4 MG/DL (ref 0–1.2)
BILIRUB UR QL STRIP: NEGATIVE
BUN SERPL-MCNC: 11 MG/DL (ref 6–20)
BUN/CREAT SERPL: 19 (ref 7–25)
CALCIUM SPEC-SCNC: 9.6 MG/DL (ref 8.6–10.5)
CHLORIDE SERPL-SCNC: 100 MMOL/L (ref 98–107)
CLARITY UR: CLEAR
CO2 SERPL-SCNC: 24 MMOL/L (ref 22–29)
COLOR UR: YELLOW
CREAT SERPL-MCNC: 0.58 MG/DL (ref 0.57–1)
DEPRECATED RDW RBC AUTO: 43.3 FL (ref 37–54)
EGFRCR SERPLBLD CKD-EPI 2021: 111.1 ML/MIN/1.73
EOSINOPHIL # BLD AUTO: 0.11 10*3/MM3 (ref 0–0.4)
EOSINOPHIL NFR BLD AUTO: 1.5 % (ref 0.3–6.2)
ERYTHROCYTE [DISTWIDTH] IN BLOOD BY AUTOMATED COUNT: 12.5 % (ref 12.3–15.4)
FLUAV RNA RESP QL NAA+PROBE: NOT DETECTED
FLUBV RNA RESP QL NAA+PROBE: NOT DETECTED
GLOBULIN UR ELPH-MCNC: 2.6 GM/DL
GLUCOSE SERPL-MCNC: 98 MG/DL (ref 65–99)
GLUCOSE UR STRIP-MCNC: NEGATIVE MG/DL
HCT VFR BLD AUTO: 40.2 % (ref 34–46.6)
HETEROPH AB SER QL LA: NEGATIVE
HGB BLD-MCNC: 13.1 G/DL (ref 12–15.9)
HGB UR QL STRIP.AUTO: ABNORMAL
HYALINE CASTS UR QL AUTO: NORMAL /LPF
IMM GRANULOCYTES # BLD AUTO: 0.03 10*3/MM3 (ref 0–0.05)
IMM GRANULOCYTES NFR BLD AUTO: 0.4 % (ref 0–0.5)
KETONES UR QL STRIP: NEGATIVE
LEUKOCYTE ESTERASE UR QL STRIP.AUTO: NEGATIVE
LYMPHOCYTES # BLD AUTO: 2.62 10*3/MM3 (ref 0.7–3.1)
LYMPHOCYTES NFR BLD AUTO: 35.8 % (ref 19.6–45.3)
MAGNESIUM SERPL-MCNC: 1.9 MG/DL (ref 1.6–2.6)
MCH RBC QN AUTO: 30.5 PG (ref 26.6–33)
MCHC RBC AUTO-ENTMCNC: 32.6 G/DL (ref 31.5–35.7)
MCV RBC AUTO: 93.5 FL (ref 79–97)
MONOCYTES # BLD AUTO: 0.41 10*3/MM3 (ref 0.1–0.9)
MONOCYTES NFR BLD AUTO: 5.6 % (ref 5–12)
NEUTROPHILS NFR BLD AUTO: 4.12 10*3/MM3 (ref 1.7–7)
NEUTROPHILS NFR BLD AUTO: 56.3 % (ref 42.7–76)
NITRITE UR QL STRIP: NEGATIVE
NRBC BLD AUTO-RTO: 0 /100 WBC (ref 0–0.2)
PH UR STRIP.AUTO: <=5 [PH] (ref 5–8)
PLATELET # BLD AUTO: 263 10*3/MM3 (ref 140–450)
PMV BLD AUTO: 8.9 FL (ref 6–12)
POTASSIUM SERPL-SCNC: 4.2 MMOL/L (ref 3.5–5.2)
PROT SERPL-MCNC: 7 G/DL (ref 6–8.5)
PROT UR QL STRIP: NEGATIVE
RBC # BLD AUTO: 4.3 10*6/MM3 (ref 3.77–5.28)
RBC # UR STRIP: NORMAL /HPF
REF LAB TEST METHOD: NORMAL
RSV RNA RESP QL NAA+PROBE: NOT DETECTED
SARS-COV-2 RNA RESP QL NAA+PROBE: NOT DETECTED
SODIUM SERPL-SCNC: 134 MMOL/L (ref 136–145)
SP GR UR STRIP: 1.02 (ref 1–1.03)
SQUAMOUS #/AREA URNS HPF: NORMAL /HPF
TSH SERPL DL<=0.05 MIU/L-ACNC: 2.63 UIU/ML (ref 0.27–4.2)
UROBILINOGEN UR QL STRIP: ABNORMAL
WBC # UR STRIP: NORMAL /HPF
WBC NRBC COR # BLD AUTO: 7.32 10*3/MM3 (ref 3.4–10.8)

## 2024-03-28 PROCEDURE — 99285 EMERGENCY DEPT VISIT HI MDM: CPT

## 2024-03-28 PROCEDURE — 25010000002 PROCHLORPERAZINE 10 MG/2ML SOLUTION: Performed by: STUDENT IN AN ORGANIZED HEALTH CARE EDUCATION/TRAINING PROGRAM

## 2024-03-28 PROCEDURE — 83735 ASSAY OF MAGNESIUM: CPT | Performed by: STUDENT IN AN ORGANIZED HEALTH CARE EDUCATION/TRAINING PROGRAM

## 2024-03-28 PROCEDURE — 70450 CT HEAD/BRAIN W/O DYE: CPT

## 2024-03-28 PROCEDURE — 70496 CT ANGIOGRAPHY HEAD: CPT

## 2024-03-28 PROCEDURE — 96365 THER/PROPH/DIAG IV INF INIT: CPT

## 2024-03-28 PROCEDURE — 25010000002 ONDANSETRON PER 1 MG: Performed by: STUDENT IN AN ORGANIZED HEALTH CARE EDUCATION/TRAINING PROGRAM

## 2024-03-28 PROCEDURE — 80050 GENERAL HEALTH PANEL: CPT | Performed by: STUDENT IN AN ORGANIZED HEALTH CARE EDUCATION/TRAINING PROGRAM

## 2024-03-28 PROCEDURE — 25010000002 DIPHENHYDRAMINE PER 50 MG: Performed by: STUDENT IN AN ORGANIZED HEALTH CARE EDUCATION/TRAINING PROGRAM

## 2024-03-28 PROCEDURE — 86308 HETEROPHILE ANTIBODY SCREEN: CPT | Performed by: STUDENT IN AN ORGANIZED HEALTH CARE EDUCATION/TRAINING PROGRAM

## 2024-03-28 PROCEDURE — 25010000002 KETOROLAC TROMETHAMINE PER 15 MG: Performed by: STUDENT IN AN ORGANIZED HEALTH CARE EDUCATION/TRAINING PROGRAM

## 2024-03-28 PROCEDURE — 81001 URINALYSIS AUTO W/SCOPE: CPT | Performed by: STUDENT IN AN ORGANIZED HEALTH CARE EDUCATION/TRAINING PROGRAM

## 2024-03-28 PROCEDURE — 25010000002 MAGNESIUM SULFATE IN D5W 1G/100ML (PREMIX) 1-5 GM/100ML-% SOLUTION: Performed by: STUDENT IN AN ORGANIZED HEALTH CARE EDUCATION/TRAINING PROGRAM

## 2024-03-28 PROCEDURE — 87637 SARSCOV2&INF A&B&RSV AMP PRB: CPT | Performed by: STUDENT IN AN ORGANIZED HEALTH CARE EDUCATION/TRAINING PROGRAM

## 2024-03-28 PROCEDURE — 96375 TX/PRO/DX INJ NEW DRUG ADDON: CPT

## 2024-03-28 PROCEDURE — 25510000001 IOPAMIDOL PER 1 ML: Performed by: STUDENT IN AN ORGANIZED HEALTH CARE EDUCATION/TRAINING PROGRAM

## 2024-03-28 PROCEDURE — 70498 CT ANGIOGRAPHY NECK: CPT

## 2024-03-28 PROCEDURE — 25810000003 SODIUM CHLORIDE 0.9 % SOLUTION: Performed by: STUDENT IN AN ORGANIZED HEALTH CARE EDUCATION/TRAINING PROGRAM

## 2024-03-28 RX ORDER — ONDANSETRON 2 MG/ML
4 INJECTION INTRAMUSCULAR; INTRAVENOUS ONCE
Status: COMPLETED | OUTPATIENT
Start: 2024-03-28 | End: 2024-03-28

## 2024-03-28 RX ORDER — DIPHENHYDRAMINE HYDROCHLORIDE 50 MG/ML
25 INJECTION INTRAMUSCULAR; INTRAVENOUS ONCE
Status: COMPLETED | OUTPATIENT
Start: 2024-03-28 | End: 2024-03-28

## 2024-03-28 RX ORDER — KETOROLAC TROMETHAMINE 15 MG/ML
15 INJECTION, SOLUTION INTRAMUSCULAR; INTRAVENOUS ONCE
Status: COMPLETED | OUTPATIENT
Start: 2024-03-28 | End: 2024-03-28

## 2024-03-28 RX ORDER — ACETAMINOPHEN 500 MG
1000 TABLET ORAL ONCE
Status: COMPLETED | OUTPATIENT
Start: 2024-03-28 | End: 2024-03-28

## 2024-03-28 RX ORDER — PROCHLORPERAZINE EDISYLATE 5 MG/ML
5 INJECTION INTRAMUSCULAR; INTRAVENOUS ONCE
Status: COMPLETED | OUTPATIENT
Start: 2024-03-28 | End: 2024-03-28

## 2024-03-28 RX ORDER — MAGNESIUM SULFATE 1 G/100ML
1 INJECTION INTRAVENOUS ONCE
Status: COMPLETED | OUTPATIENT
Start: 2024-03-28 | End: 2024-03-28

## 2024-03-28 RX ADMIN — ONDANSETRON 4 MG: 2 INJECTION INTRAMUSCULAR; INTRAVENOUS at 10:20

## 2024-03-28 RX ADMIN — KETOROLAC TROMETHAMINE 15 MG: 15 INJECTION, SOLUTION INTRAMUSCULAR; INTRAVENOUS at 11:51

## 2024-03-28 RX ADMIN — IOPAMIDOL 75 ML: 755 INJECTION, SOLUTION INTRAVENOUS at 11:44

## 2024-03-28 RX ADMIN — DIPHENHYDRAMINE HYDROCHLORIDE 25 MG: 50 INJECTION, SOLUTION INTRAMUSCULAR; INTRAVENOUS at 10:21

## 2024-03-28 RX ADMIN — ACETAMINOPHEN 1000 MG: 500 TABLET ORAL at 10:21

## 2024-03-28 RX ADMIN — SODIUM CHLORIDE 1000 ML: 9 INJECTION, SOLUTION INTRAVENOUS at 10:21

## 2024-03-28 RX ADMIN — MAGNESIUM SULFATE HEPTAHYDRATE 1 G: 1 INJECTION, SOLUTION INTRAVENOUS at 10:21

## 2024-03-28 RX ADMIN — PROCHLORPERAZINE EDISYLATE 5 MG: 5 INJECTION INTRAMUSCULAR; INTRAVENOUS at 10:20

## 2024-03-28 NOTE — ED PROVIDER NOTES
EMERGENCY DEPARTMENT ENCOUNTER    Pt Name: Mishel Freeman  MRN: [id number]  Pt :   1975  Room Number:    Date of encounter:  3/28/2024  PCP: Anastacia Jose MD  ED Provider: James Guerrero MD    Historian: Patient      HPI:  Chief Complaint: Headache        Context: Mishel Freeman is a 48-year-old female with prior history of headaches who presents because of around 1 day of severe left-sided retro-orbital headache radiating down her left neck.  She denies midline neck pain.  Denies fevers.  Denies any weakness numbness speech alterations.  Denies visual symptoms or jaw claudication.  Describes it as throbbing.  Also noticed her blood pressure was 150/100 yesterday.  Is having associated nausea.,  Feels like her left neck is tender to palpation extending under the left jaw.  No other complaints at this time.      PAST MEDICAL HISTORY  Past Medical History:   Diagnosis Date    Anxiety     Depression     Diverticulitis     Eczema     GERD (gastroesophageal reflux disease)     Headache     Hypertension     Lung nodule     Tobacco abuse          PAST SURGICAL HISTORY  Past Surgical History:   Procedure Laterality Date    BRONCHOSCOPY N/A 2019    Procedure: DARIN, RADIAL EBUS WITH FLUORO BRONCH;  Surgeon: Nikhil Knutson MD;  Location: [facility] ENDOSCOPY;  Service: Pulmonary    CHOLECYSTECTOMY      COLONOSCOPY  ,     ENDOMETRIAL ABLATION W/ NOVASURE      WITH LAP BTL    VAGINAL HYSTERECTOMY      Partial          FAMILY HISTORY  Family History   Problem Relation Age of Onset    Breast cancer Paternal Aunt         40's    Cancer Mother     Diabetes Father     Diabetes Brother     Cancer Maternal Aunt     Ovarian cancer Neg Hx          SOCIAL HISTORY  Social History     Socioeconomic History    Marital status:     Number of children: 1   Tobacco Use    Smoking status: Every Day     Current packs/day: 0.25     Average packs/day: 0.3 packs/day for  30.0 years (7.5 ttl pk-yrs)     Types: Cigarettes    Smokeless tobacco: Never    Tobacco comments:     5 a day    Vaping Use    Vaping status: Never Used   Substance and Sexual Activity    Alcohol use: Yes     Alcohol/week: 1.0 standard drink of alcohol     Types: 1 Cans of beer per week     Comment: socially    Drug use: No    Sexual activity: Defer         ALLERGIES  Macrobid [nitrofurantoin macrocrystal]        REVIEW OF SYSTEMS  Review of Systems       All systems reviewed and negative except for those discussed in HPI.       PHYSICAL EXAM    I have reviewed the triage vital signs and nursing notes.    ED Triage Vitals [03/28/24 0931]   Temp Heart Rate Resp BP SpO2   98.5 °F (36.9 °C) 96 18 139/99 98 %      Temp src Heart Rate Source Patient Position BP Location FiO2 (%)   Oral Monitor Sitting Left arm --       Physical Exam  GENERAL:   Appears in no acute distress.   HENT: Nares patent.  Not meningitic able to touch chin to chest without midline tenderness, tenderness over left lateral paraspinal muscles.  EYES: No scleral icterus.  CV: Regular rhythm, regular rate.  RESPIRATORY: Normal effort.  No audible wheezes, rales or rhonchi.  ABDOMEN: Soft, nontender  MUSCULOSKELETAL: No deformities.   NEURO: Alert, moves all extremities, follows commands.  SKIN: Warm, dry, no rash visualized.      LAB RESULTS  Recent Results (from the past 24 hour(s))   Comprehensive Metabolic Panel    Collection Time: 03/28/24 10:16 AM    Specimen: Blood   Result Value Ref Range    Glucose 98 65 - 99 mg/dL    BUN 11 6 - 20 mg/dL    Creatinine 0.58 0.57 - 1.00 mg/dL    Sodium 134 (L) 136 - 145 mmol/L    Potassium 4.2 3.5 - 5.2 mmol/L    Chloride 100 98 - 107 mmol/L    CO2 24.0 22.0 - 29.0 mmol/L    Calcium 9.6 8.6 - 10.5 mg/dL    Total Protein 7.0 6.0 - 8.5 g/dL    Albumin 4.4 3.5 - 5.2 g/dL    ALT (SGPT) 25 1 - 33 U/L    AST (SGOT) 17 1 - 32 U/L    Alkaline Phosphatase 60 39 - 117 U/L    Total Bilirubin 0.4 0.0 - 1.2 mg/dL    Globulin  2.6 gm/dL    A/G Ratio 1.7 g/dL    BUN/Creatinine Ratio 19.0 7.0 - 25.0    Anion Gap 10.0 5.0 - 15.0 mmol/L    eGFR 111.1 >60.0 mL/min/1.73   Mononucleosis Screen    Collection Time: 03/28/24 10:16 AM    Specimen: Blood   Result Value Ref Range    Monospot Negative Negative   Magnesium    Collection Time: 03/28/24 10:16 AM    Specimen: Blood   Result Value Ref Range    Magnesium 1.9 1.6 - 2.6 mg/dL   TSH    Collection Time: 03/28/24 10:16 AM    Specimen: Blood   Result Value Ref Range    TSH 2.630 0.270 - 4.200 uIU/mL   CBC Auto Differential    Collection Time: 03/28/24 10:16 AM    Specimen: Blood   Result Value Ref Range    WBC 7.32 3.40 - 10.80 10*3/mm3    RBC 4.30 3.77 - 5.28 10*6/mm3    Hemoglobin 13.1 12.0 - 15.9 g/dL    Hematocrit 40.2 34.0 - 46.6 %    MCV 93.5 79.0 - 97.0 fL    MCH 30.5 26.6 - 33.0 pg    MCHC 32.6 31.5 - 35.7 g/dL    RDW 12.5 12.3 - 15.4 %    RDW-SD 43.3 37.0 - 54.0 fl    MPV 8.9 6.0 - 12.0 fL    Platelets 263 140 - 450 10*3/mm3    Neutrophil % 56.3 42.7 - 76.0 %    Lymphocyte % 35.8 19.6 - 45.3 %    Monocyte % 5.6 5.0 - 12.0 %    Eosinophil % 1.5 0.3 - 6.2 %    Basophil % 0.4 0.0 - 1.5 %    Immature Grans % 0.4 0.0 - 0.5 %    Neutrophils, Absolute 4.12 1.70 - 7.00 10*3/mm3    Lymphocytes, Absolute 2.62 0.70 - 3.10 10*3/mm3    Monocytes, Absolute 0.41 0.10 - 0.90 10*3/mm3    Eosinophils, Absolute 0.11 0.00 - 0.40 10*3/mm3    Basophils, Absolute 0.03 0.00 - 0.20 10*3/mm3    Immature Grans, Absolute 0.03 0.00 - 0.05 10*3/mm3    nRBC 0.0 0.0 - 0.2 /100 WBC   COVID-19, FLU A/B, RSV PCR 1 HR TAT - Swab, Nasopharynx    Collection Time: 03/28/24 10:17 AM    Specimen: Nasopharynx; Swab   Result Value Ref Range    COVID19 Not Detected Not Detected - Ref. Range    Influenza A PCR Not Detected Not Detected    Influenza B PCR Not Detected Not Detected    RSV, PCR Not Detected Not Detected   Urinalysis With Microscopic If Indicated (No Culture) - Urine, Clean Catch    Collection Time: 03/28/24 10:21 AM     Specimen: Urine, Clean Catch   Result Value Ref Range    Color, UA Yellow Yellow, Straw    Appearance, UA Clear Clear    pH, UA <=5.0 5.0 - 8.0    Specific Gravity, UA 1.022 1.001 - 1.030    Glucose, UA Negative Negative    Ketones, UA Negative Negative    Bilirubin, UA Negative Negative    Blood, UA Small (1+) (A) Negative    Protein, UA Negative Negative    Leuk Esterase, UA Negative Negative    Nitrite, UA Negative Negative    Urobilinogen, UA 0.2 E.U./dL 0.2 - 1.0 E.U./dL   Urinalysis, Microscopic Only - Urine, Clean Catch    Collection Time: 03/28/24 10:21 AM    Specimen: Urine, Clean Catch   Result Value Ref Range    RBC, UA 0-2 None Seen, 0-2 /HPF    WBC, UA 0-2 None Seen, 0-2 /HPF    Bacteria, UA None Seen None Seen, Trace /HPF    Squamous Epithelial Cells, UA 0-2 None Seen, 0-2 /HPF    Hyaline Casts, UA 0-6 0 - 6 /LPF    Methodology Automated Microscopy        If labs were ordered, I independently reviewed the results and considered them in treating the patient.        RADIOLOGY  CT Head Without Contrast    Result Date: 3/28/2024  CT HEAD WO CONTRAST, CT ANGIOGRAM HEAD, CT ANGIOGRAM NECK Date of Exam: 3/28/2024 11:23 AM EDT Indication: acute severe left sided headache unlike any prior headaches, throbbing left neck pain, tender without appreciated swelling. Comparison: Head CT 4/2/2021 Technique: Axial CT images were obtained of the head without contrast administration.  Automated exposure control and iterative construction methods were used. CTA of the head and neck was performed after the uneventful intravenous administration of 75 mL Isovue-370.  Reconstructed coronal and sagittal images were also obtained. In addition, a 3-D volume rendered image was created for interpretation. Automated exposure control and iterative reconstruction methods were used. Findings: Noncontrast head CT: No acute intracranial hemorrhage. No acute large territory infarct. No extra-axial collections or mass effect. Normal size  and configuration of the ventricles. Unremarkable appearance of the orbits. Partially imaged paranasal sinuses are clear. Mastoid air cells are clear. No acute or suspicious bony findings. CTA head and neck: No abrupt cut off/large vessel occlusion, flow-limiting stenosis, dissection, or aneurysm. There is fetal origin of the left PCA, normal variant. The cerebral veins and dural venous sinuses are grossly patent. No significant atherosclerosis. Extravascular findings: Lung apices are grossly clear. Unremarkable appearance of the thyroid gland. No pharyngeal or laryngeal mass lesion. No cervical adenopathy.     Impression: No acute intracranial findings. Normal CTA of the head and neck. Electronically Signed: Luis Gomez MD  3/28/2024 12:56 PM EDT  Workstation ID: [id number]    CT Angiogram Head    Result Date: 3/28/2024  CT HEAD WO CONTRAST, CT ANGIOGRAM HEAD, CT ANGIOGRAM NECK Date of Exam: 3/28/2024 11:23 AM EDT Indication: acute severe left sided headache unlike any prior headaches, throbbing left neck pain, tender without appreciated swelling. Comparison: Head CT 4/2/2021 Technique: Axial CT images were obtained of the head without contrast administration.  Automated exposure control and iterative construction methods were used. CTA of the head and neck was performed after the uneventful intravenous administration of 75 mL Isovue-370.  Reconstructed coronal and sagittal images were also obtained. In addition, a 3-D volume rendered image was created for interpretation. Automated exposure control and iterative reconstruction methods were used. Findings: Noncontrast head CT: No acute intracranial hemorrhage. No acute large territory infarct. No extra-axial collections or mass effect. Normal size and configuration of the ventricles. Unremarkable appearance of the orbits. Partially imaged paranasal sinuses are clear. Mastoid air cells are clear. No acute or suspicious bony findings. CTA head and neck: No abrupt  cut off/large vessel occlusion, flow-limiting stenosis, dissection, or aneurysm. There is fetal origin of the left PCA, normal variant. The cerebral veins and dural venous sinuses are grossly patent. No significant atherosclerosis. Extravascular findings: Lung apices are grossly clear. Unremarkable appearance of the thyroid gland. No pharyngeal or laryngeal mass lesion. No cervical adenopathy.     Impression: No acute intracranial findings. Normal CTA of the head and neck. Electronically Signed: Luis Gomez MD  3/28/2024 12:56 PM EDT  Workstation ID: [id number]    CT Angiogram Neck    Result Date: 3/28/2024  CT HEAD WO CONTRAST, CT ANGIOGRAM HEAD, CT ANGIOGRAM NECK Date of Exam: 3/28/2024 11:23 AM EDT Indication: acute severe left sided headache unlike any prior headaches, throbbing left neck pain, tender without appreciated swelling. Comparison: Head CT 4/2/2021 Technique: Axial CT images were obtained of the head without contrast administration.  Automated exposure control and iterative construction methods were used. CTA of the head and neck was performed after the uneventful intravenous administration of 75 mL Isovue-370.  Reconstructed coronal and sagittal images were also obtained. In addition, a 3-D volume rendered image was created for interpretation. Automated exposure control and iterative reconstruction methods were used. Findings: Noncontrast head CT: No acute intracranial hemorrhage. No acute large territory infarct. No extra-axial collections or mass effect. Normal size and configuration of the ventricles. Unremarkable appearance of the orbits. Partially imaged paranasal sinuses are clear. Mastoid air cells are clear. No acute or suspicious bony findings. CTA head and neck: No abrupt cut off/large vessel occlusion, flow-limiting stenosis, dissection, or aneurysm. There is fetal origin of the left PCA, normal variant. The cerebral veins and dural venous sinuses are grossly patent. No significant  atherosclerosis. Extravascular findings: Lung apices are grossly clear. Unremarkable appearance of the thyroid gland. No pharyngeal or laryngeal mass lesion. No cervical adenopathy.     Impression: No acute intracranial findings. Normal CTA of the head and neck. Electronically Signed: Luis Gomez MD  3/28/2024 12:56 PM EDT  Workstation ID: [id number]     I ordered and independently reviewed the above noted radiographic studies.      I viewed images of head CT and CTAs which showed no acute bleeds, masses, stroke, or fluid collections.  Per my independent interpretation.    See radiologist's dictation for official interpretation.        PROCEDURES    Procedures    No orders to display       MEDICATIONS GIVEN IN ER    Medications   sodium chloride 0.9 % bolus 1,000 mL (0 mL Intravenous Stopped 3/28/24 1245)   ondansetron (ZOFRAN) injection 4 mg (4 mg Intravenous Given 3/28/24 1020)   acetaminophen (TYLENOL) tablet 1,000 mg (1,000 mg Oral Given 3/28/24 1021)   prochlorperazine (COMPAZINE) injection 5 mg (5 mg Intravenous Given 3/28/24 1020)   diphenhydrAMINE (BENADRYL) injection 25 mg (25 mg Intravenous Given 3/28/24 1021)   magnesium sulfate in D5W 1g/100mL (PREMIX) (0 g Intravenous Stopped 3/28/24 1151)   iopamidol (ISOVUE-370) 76 % injection 75 mL (75 mL Intravenous Given 3/28/24 1144)   ketorolac (TORADOL) injection 15 mg (15 mg Intravenous Given 3/28/24 1151)         MEDICAL DECISION MAKING, PROGRESS, and CONSULTS    All labs, if obtained, have been independently reviewed by me.  All radiology studies, if obtained, have been reviewed by me and the radiologist dictating the report.  All EKG's, if obtained, have been independently viewed and interpreted by me/my attending physician.      Discussion below represents my analysis of pertinent findings related to patient's condition, differential diagnosis, treatment plan and final disposition.                         Differential diagnosis:    Migraine headache,  meningitis, subarachnoid hemorrhage, carotid dissection, tension type headache, sepsis, anemia, electrolyte abnormality      Additional sources:    - Discussed/ obtained information from independent historians:      - External (non-ED) record review:  Chart review of previous hospitalization for colitis shows history of:  C. difficile colitis, GERD, hypertension, chronic tobacco    - Chronic or social conditions impacting care: Hypertension, GERD,    - Shared decision making: Agreeable to discharge      Orders placed during this visit:  Orders Placed This Encounter   Procedures    COVID PRE-OP / PRE-PROCEDURE SCREENING ORDER (NO ISOLATION) - Swab, Nasopharynx    COVID-19, FLU A/B, RSV PCR 1 HR TAT - Swab, Nasopharynx    CT Head Without Contrast    CT Angiogram Head    CT Angiogram Neck    Comprehensive Metabolic Panel    Urinalysis With Microscopic If Indicated (No Culture) - Urine, Clean Catch    Mononucleosis Screen    Magnesium    TSH    CBC Auto Differential    Urinalysis, Microscopic Only - Urine, Clean Catch    CBC & Differential         Additional orders considered but not ordered:      ED Course:    Consultants:      ED Course as of 03/28/24 1613   u Mar 28, 2024   8215 Chart review of previous hospitalization for colitis shows history of:  C. difficile colitis, GERD, hypertension, chronic tobacco [CC]   0948 In summary is a 48-year-old female with prior history of headaches who presents because of around 1 day of severe left-sided retro-orbital headache radiating down her left neck.  She denies midline neck pain.  Denies fevers.  Denies any weakness numbness speech alterations.  Denies visual symptoms or jaw claudication.  Describes it as throbbing.  Also noticed her blood pressure was 150/100 yesterday.  Is having associated nausea.,  Feels like her left neck is tender to palpation extending under the left jaw.  No other complaints at this time. [CC]   0949 She arrived awake and alert mildly hypertensive  otherwise vitals within normal limits afebrile easily touches chin to chest without midline neck tenderness and is not meningitic.  Unilateral throbbing headache potentially migraine headache but denies photophobia or phonophobia.  Also has the tenderness under the left mandible, do not appreciate any swelling though this is somewhat limited by habitus.  Because she has never had headache like this before obtaining CT scan of the head as well as CTA of the head and neck both to evaluate for vascular injury but also deep space infection edema or other acute pathology.  Treating with IV fluids Compazine Zofran magnesium diphenhydramine and acetaminophen can add Toradol as long as no active bleeding on Noncon head CT. [CC]   1612 CT scans were negative and Toradol was added.  New labs labs extremely reassuring no actionable items on CBC or CMP.  Mononucleosis test negative  Normal TSH  Normal magnesium  COVID and flu swab is negative  Urinalysis is clean.  Upon reevaluation she is resting comfortably endorses resolution of her headache.  I think she can safely discharge provided neurology follow-up should this become recurring if she develops fevers or any other concerning symptoms will return to the ED right away.  Discharged in stable condition. [CC]      ED Course User Index  [CC] James Guerrero MD              Shared Decision Making:  After my consideration of clinical presentation and any laboratory/radiology studies obtained, I discussed the findings with the patient/patient representative who is in agreement with the treatment plan and the final disposition.   Risks and benefits of discharge and/or observation/admission were discussed.       AS OF 16:13 EDT VITALS:    BP - 124/72  HR - 71  TEMP - 98.5 °F (36.9 °C) (Oral)  O2 SATS - 94%                  DIAGNOSIS  Final diagnoses:   Other migraine without status migrainosus, not intractable   Neck pain on left side   Transient hypertension          DISPOSITION  DISCHARGE    Patient discharged in stable condition.    Reviewed implications of results, diagnosis, meds, responsibility to follow up, warning signs and symptoms of possible worsening, potential complications and reasons to return to ER.    Patient/Family voiced understanding of above instructions.    Discussed plan for discharge, as there is no emergent indication for admission.  Pt/family is agreeable and understands need for follow up and possible repeat testing.  Pt/family is aware that discharge does not mean that nothing is wrong but that it indicates no emergency is currently present that requires admission and they must continue care with follow-up as given below or with a physician of their choice.     FOLLOW-UP  Anastacia Jose MD  [address] [id number] [phone]          [facility] NEUROLOGY  [address] [facility] [address] [phone]    If headaches become frequent and he would like neurology evaluation.         Medication List      No changes were made to your prescriptions during this visit.             Please note that portions of this document were completed with voice recognition software.        James Guerrero MD  03/28/24 4068

## 2024-03-28 NOTE — DISCHARGE INSTRUCTIONS
Treat headaches with over-the-counter Excedrin Migraine maintain good oral hydration and good sleep hygiene.  Follow-up with your PCP.  While today's workup was reassuring if your symptoms change or worsen please return to the ED or seek other medical care.

## (undated) DEVICE — SYR SLPTP 20CC

## (undated) DEVICE — BRSH CYTO INREACH SHEATH 1.8X2MM 130CM

## (undated) DEVICE — KT BRONCH NAV EDGE 90D EXT WO/ ADAPT

## (undated) DEVICE — SOL LR 1000ML

## (undated) DEVICE — ADAPT BRONCH EDGE FOR USE W/OLYMPUS SCOPE

## (undated) DEVICE — PTCH SENSR INREACH PULM GUIDANCE

## (undated) DEVICE — FRCP BIOP SUPERDIMENSION PREMARK

## (undated) DEVICE — ELECTRODE, ECG, FOAM, 3PK: Brand: MEDLINE

## (undated) DEVICE — SINGLE USE SUCTION VALVE MAJ-209: Brand: SINGLE USE SUCTION VALVE (STERILE)

## (undated) DEVICE — BOWL UTIL STRL 32OZ

## (undated) DEVICE — SENSR O2 OXIMAX FNGR A/ 18IN NONSTR

## (undated) DEVICE — TRAP,MUCUS SPECIMEN,40CC: Brand: MEDLINE